# Patient Record
Sex: MALE | Race: WHITE | Employment: OTHER | URBAN - METROPOLITAN AREA
[De-identification: names, ages, dates, MRNs, and addresses within clinical notes are randomized per-mention and may not be internally consistent; named-entity substitution may affect disease eponyms.]

---

## 2018-11-02 ENCOUNTER — HOSPITAL ENCOUNTER (OUTPATIENT)
Facility: CLINIC | Age: 38
Discharge: HOME | End: 2018-11-02
Attending: OPHTHALMOLOGY | Admitting: OPHTHALMOLOGY
Payer: COMMERCIAL

## 2018-11-02 ENCOUNTER — SURGERY (OUTPATIENT)
Age: 38
End: 2018-11-02

## 2018-11-02 ENCOUNTER — ANESTHESIA EVENT (OUTPATIENT)
Dept: SURGERY | Facility: CLINIC | Age: 38
End: 2018-11-02
Payer: COMMERCIAL

## 2018-11-02 ENCOUNTER — TELEPHONE (OUTPATIENT)
Dept: OPHTHALMOLOGY | Facility: CLINIC | Age: 38
End: 2018-11-02

## 2018-11-02 ENCOUNTER — ANESTHESIA (OUTPATIENT)
Dept: SURGERY | Facility: CLINIC | Age: 38
End: 2018-11-02
Payer: COMMERCIAL

## 2018-11-02 ENCOUNTER — TRANSFERRED RECORDS (OUTPATIENT)
Dept: HEALTH INFORMATION MANAGEMENT | Facility: CLINIC | Age: 38
End: 2018-11-02

## 2018-11-02 VITALS
RESPIRATION RATE: 16 BRPM | DIASTOLIC BLOOD PRESSURE: 92 MMHG | WEIGHT: 289 LBS | SYSTOLIC BLOOD PRESSURE: 165 MMHG | TEMPERATURE: 97.5 F | OXYGEN SATURATION: 97 % | HEIGHT: 73 IN | BODY MASS INDEX: 38.3 KG/M2

## 2018-11-02 DIAGNOSIS — S05.31XA RUPTURED GLOBE OF RIGHT EYE, INITIAL ENCOUNTER: Primary | ICD-10-CM

## 2018-11-02 LAB
GRAM STN SPEC: ABNORMAL
GRAM STN SPEC: NORMAL
KOH PREP SPEC: NORMAL
Lab: ABNORMAL
Lab: ABNORMAL
Lab: NORMAL
SPECIMEN SOURCE: ABNORMAL
SPECIMEN SOURCE: ABNORMAL
SPECIMEN SOURCE: NORMAL

## 2018-11-02 PROCEDURE — 87801 DETECT AGNT MULT DNA AMPLI: CPT | Performed by: OPHTHALMOLOGY

## 2018-11-02 PROCEDURE — 25000566 ZZH SEVOFLURANE, EA 15 MIN: Performed by: OPHTHALMOLOGY

## 2018-11-02 PROCEDURE — 27210794 ZZH OR GENERAL SUPPLY STERILE: Performed by: OPHTHALMOLOGY

## 2018-11-02 PROCEDURE — 71000004 ZZH RECOVERY EYE PHASE 1 LEVEL 1 FIRST HR: Performed by: OPHTHALMOLOGY

## 2018-11-02 PROCEDURE — 87075 CULTR BACTERIA EXCEPT BLOOD: CPT | Performed by: OPHTHALMOLOGY

## 2018-11-02 PROCEDURE — 37000009 ZZH ANESTHESIA TECHNICAL FEE, EACH ADDTL 15 MIN: Performed by: OPHTHALMOLOGY

## 2018-11-02 PROCEDURE — 87176 TISSUE HOMOGENIZATION CULTR: CPT | Performed by: OPHTHALMOLOGY

## 2018-11-02 PROCEDURE — 36000104 ZZH EYE SURGERY LEVEL 4 1ST 30 MIN: Performed by: OPHTHALMOLOGY

## 2018-11-02 PROCEDURE — 87186 SC STD MICRODIL/AGAR DIL: CPT | Performed by: OPHTHALMOLOGY

## 2018-11-02 PROCEDURE — 87070 CULTURE OTHR SPECIMN AEROBIC: CPT | Performed by: OPHTHALMOLOGY

## 2018-11-02 PROCEDURE — 25000125 ZZHC RX 250: Performed by: NURSE ANESTHETIST, CERTIFIED REGISTERED

## 2018-11-02 PROCEDURE — 25000128 H RX IP 250 OP 636: Performed by: NURSE ANESTHETIST, CERTIFIED REGISTERED

## 2018-11-02 PROCEDURE — 87181 SC STD AGAR DILUTION PER AGT: CPT | Performed by: OPHTHALMOLOGY

## 2018-11-02 PROCEDURE — 87210 SMEAR WET MOUNT SALINE/INK: CPT | Performed by: OPHTHALMOLOGY

## 2018-11-02 PROCEDURE — 37000008 ZZH ANESTHESIA TECHNICAL FEE, 1ST 30 MIN: Performed by: OPHTHALMOLOGY

## 2018-11-02 PROCEDURE — 25000125 ZZHC RX 250: Performed by: OPHTHALMOLOGY

## 2018-11-02 PROCEDURE — 25000128 H RX IP 250 OP 636: Performed by: OPHTHALMOLOGY

## 2018-11-02 PROCEDURE — 87205 SMEAR GRAM STAIN: CPT | Performed by: OPHTHALMOLOGY

## 2018-11-02 PROCEDURE — 87102 FUNGUS ISOLATION CULTURE: CPT | Performed by: OPHTHALMOLOGY

## 2018-11-02 PROCEDURE — 71000028 ZZH EYE RECOVERY PHASE 2 EACH 15 MINS: Performed by: OPHTHALMOLOGY

## 2018-11-02 PROCEDURE — 40000170 ZZH STATISTIC PRE-PROCEDURE ASSESSMENT II: Performed by: OPHTHALMOLOGY

## 2018-11-02 PROCEDURE — 87076 CULTURE ANAEROBE IDENT EACH: CPT | Performed by: OPHTHALMOLOGY

## 2018-11-02 PROCEDURE — 36000105 ZZH EYE SURGERY LEVEL 4 EA 15 ADDTL MIN: Performed by: OPHTHALMOLOGY

## 2018-11-02 PROCEDURE — 87077 CULTURE AEROBIC IDENTIFY: CPT | Performed by: OPHTHALMOLOGY

## 2018-11-02 RX ORDER — DEXAMETHASONE SODIUM PHOSPHATE 4 MG/ML
INJECTION, SOLUTION INTRA-ARTICULAR; INTRALESIONAL; INTRAMUSCULAR; INTRAVENOUS; SOFT TISSUE PRN
Status: DISCONTINUED | OUTPATIENT
Start: 2018-11-02 | End: 2018-11-02

## 2018-11-02 RX ORDER — GLYCOPYRROLATE 0.2 MG/ML
INJECTION, SOLUTION INTRAMUSCULAR; INTRAVENOUS PRN
Status: DISCONTINUED | OUTPATIENT
Start: 2018-11-02 | End: 2018-11-02

## 2018-11-02 RX ORDER — CIPROFLOXACIN 2 MG/ML
INJECTION, SOLUTION INTRAVENOUS PRN
Status: DISCONTINUED | OUTPATIENT
Start: 2018-11-02 | End: 2018-11-02

## 2018-11-02 RX ORDER — BALANCED SALT SOLUTION 6.4; .75; .48; .3; 3.9; 1.7 MG/ML; MG/ML; MG/ML; MG/ML; MG/ML; MG/ML
SOLUTION OPHTHALMIC PRN
Status: DISCONTINUED | OUTPATIENT
Start: 2018-11-02 | End: 2018-11-02 | Stop reason: HOSPADM

## 2018-11-02 RX ORDER — LIDOCAINE HYDROCHLORIDE 20 MG/ML
INJECTION, SOLUTION INFILTRATION; PERINEURAL PRN
Status: DISCONTINUED | OUTPATIENT
Start: 2018-11-02 | End: 2018-11-02

## 2018-11-02 RX ORDER — SODIUM CHLORIDE, SODIUM LACTATE, POTASSIUM CHLORIDE, CALCIUM CHLORIDE 600; 310; 30; 20 MG/100ML; MG/100ML; MG/100ML; MG/100ML
INJECTION, SOLUTION INTRAVENOUS CONTINUOUS
Status: DISCONTINUED | OUTPATIENT
Start: 2018-11-02 | End: 2018-11-02 | Stop reason: HOSPADM

## 2018-11-02 RX ORDER — ONDANSETRON 4 MG/1
4 TABLET, ORALLY DISINTEGRATING ORAL EVERY 30 MIN PRN
Status: DISCONTINUED | OUTPATIENT
Start: 2018-11-02 | End: 2018-11-02 | Stop reason: HOSPADM

## 2018-11-02 RX ORDER — DEXAMETHASONE SODIUM PHOSPHATE 4 MG/ML
INJECTION, SOLUTION INTRA-ARTICULAR; INTRALESIONAL; INTRAMUSCULAR; INTRAVENOUS; SOFT TISSUE PRN
Status: DISCONTINUED | OUTPATIENT
Start: 2018-11-02 | End: 2018-11-02 | Stop reason: HOSPADM

## 2018-11-02 RX ORDER — CYCLOPENTOLATE HYDROCHLORIDE 10 MG/ML
1 SOLUTION/ DROPS OPHTHALMIC
Status: DISCONTINUED | OUTPATIENT
Start: 2018-11-02 | End: 2018-11-02 | Stop reason: HOSPADM

## 2018-11-02 RX ORDER — HYDROMORPHONE HYDROCHLORIDE 1 MG/ML
.3-.5 INJECTION, SOLUTION INTRAMUSCULAR; INTRAVENOUS; SUBCUTANEOUS EVERY 10 MIN PRN
Status: DISCONTINUED | OUTPATIENT
Start: 2018-11-02 | End: 2018-11-02 | Stop reason: HOSPADM

## 2018-11-02 RX ORDER — ONDANSETRON 2 MG/ML
4 INJECTION INTRAMUSCULAR; INTRAVENOUS EVERY 30 MIN PRN
Status: DISCONTINUED | OUTPATIENT
Start: 2018-11-02 | End: 2018-11-02 | Stop reason: HOSPADM

## 2018-11-02 RX ORDER — PROPOFOL 10 MG/ML
INJECTION, EMULSION INTRAVENOUS PRN
Status: DISCONTINUED | OUTPATIENT
Start: 2018-11-02 | End: 2018-11-02

## 2018-11-02 RX ORDER — MEPERIDINE HYDROCHLORIDE 25 MG/ML
12.5 INJECTION INTRAMUSCULAR; INTRAVENOUS; SUBCUTANEOUS
Status: DISCONTINUED | OUTPATIENT
Start: 2018-11-02 | End: 2018-11-02 | Stop reason: HOSPADM

## 2018-11-02 RX ORDER — SODIUM CHLORIDE, SODIUM LACTATE, POTASSIUM CHLORIDE, CALCIUM CHLORIDE 600; 310; 30; 20 MG/100ML; MG/100ML; MG/100ML; MG/100ML
INJECTION, SOLUTION INTRAVENOUS CONTINUOUS PRN
Status: DISCONTINUED | OUTPATIENT
Start: 2018-11-02 | End: 2018-11-02

## 2018-11-02 RX ORDER — NALOXONE HYDROCHLORIDE 0.4 MG/ML
.1-.4 INJECTION, SOLUTION INTRAMUSCULAR; INTRAVENOUS; SUBCUTANEOUS
Status: DISCONTINUED | OUTPATIENT
Start: 2018-11-02 | End: 2018-11-02 | Stop reason: HOSPADM

## 2018-11-02 RX ORDER — FENTANYL CITRATE 50 UG/ML
INJECTION, SOLUTION INTRAMUSCULAR; INTRAVENOUS PRN
Status: DISCONTINUED | OUTPATIENT
Start: 2018-11-02 | End: 2018-11-02

## 2018-11-02 RX ORDER — PHENYLEPHRINE HYDROCHLORIDE 25 MG/ML
1 SOLUTION/ DROPS OPHTHALMIC
Status: DISCONTINUED | OUTPATIENT
Start: 2018-11-02 | End: 2018-11-02 | Stop reason: HOSPADM

## 2018-11-02 RX ORDER — FENTANYL CITRATE 50 UG/ML
25-50 INJECTION, SOLUTION INTRAMUSCULAR; INTRAVENOUS
Status: DISCONTINUED | OUTPATIENT
Start: 2018-11-02 | End: 2018-11-02 | Stop reason: HOSPADM

## 2018-11-02 RX ORDER — NEOSTIGMINE METHYLSULFATE 1 MG/ML
VIAL (ML) INJECTION PRN
Status: DISCONTINUED | OUTPATIENT
Start: 2018-11-02 | End: 2018-11-02

## 2018-11-02 RX ORDER — TROPICAMIDE 10 MG/ML
1 SOLUTION/ DROPS OPHTHALMIC
Status: DISCONTINUED | OUTPATIENT
Start: 2018-11-02 | End: 2018-11-02 | Stop reason: HOSPADM

## 2018-11-02 RX ORDER — TETRACAINE HYDROCHLORIDE 5 MG/ML
SOLUTION OPHTHALMIC PRN
Status: DISCONTINUED | OUTPATIENT
Start: 2018-11-02 | End: 2018-11-02 | Stop reason: HOSPADM

## 2018-11-02 RX ORDER — ATROPINE SULFATE 10 MG/ML
SOLUTION/ DROPS OPHTHALMIC PRN
Status: DISCONTINUED | OUTPATIENT
Start: 2018-11-02 | End: 2018-11-02 | Stop reason: HOSPADM

## 2018-11-02 RX ORDER — ONDANSETRON 2 MG/ML
INJECTION INTRAMUSCULAR; INTRAVENOUS PRN
Status: DISCONTINUED | OUTPATIENT
Start: 2018-11-02 | End: 2018-11-02

## 2018-11-02 RX ADMIN — WATER 0.5 ML: 1 INJECTION INTRAMUSCULAR; INTRAVENOUS; SUBCUTANEOUS at 17:17

## 2018-11-02 RX ADMIN — TROPICAMIDE 1 DROP: 10 SOLUTION/ DROPS OPHTHALMIC at 14:55

## 2018-11-02 RX ADMIN — FENTANYL CITRATE 100 MCG: 50 INJECTION, SOLUTION INTRAMUSCULAR; INTRAVENOUS at 15:26

## 2018-11-02 RX ADMIN — ONDANSETRON 4 MG: 2 INJECTION INTRAMUSCULAR; INTRAVENOUS at 17:21

## 2018-11-02 RX ADMIN — CIPROFLOXACIN 400 MG: 2 INJECTION INTRAVENOUS at 16:00

## 2018-11-02 RX ADMIN — PROPOFOL 50 MG: 10 INJECTION, EMULSION INTRAVENOUS at 16:08

## 2018-11-02 RX ADMIN — CEFTAZIDIME 0.1 ML: 2 INJECTION, POWDER, FOR SOLUTION INTRAVENOUS at 17:17

## 2018-11-02 RX ADMIN — PHENYLEPHRINE HYDROCHLORIDE 1 DROP: 25 SOLUTION/ DROPS OPHTHALMIC at 14:55

## 2018-11-02 RX ADMIN — PHENYLEPHRINE HYDROCHLORIDE 1 DROP: 25 SOLUTION/ DROPS OPHTHALMIC at 15:05

## 2018-11-02 RX ADMIN — LIDOCAINE HYDROCHLORIDE 60 MG: 20 INJECTION, SOLUTION INFILTRATION; PERINEURAL at 15:26

## 2018-11-02 RX ADMIN — TROPICAMIDE 1 DROP: 10 SOLUTION/ DROPS OPHTHALMIC at 15:06

## 2018-11-02 RX ADMIN — Medication 5.5 MG: at 16:41

## 2018-11-02 RX ADMIN — DEXMEDETOMIDINE HYDROCHLORIDE 4 MCG: 100 INJECTION, SOLUTION INTRAVENOUS at 16:23

## 2018-11-02 RX ADMIN — CYCLOPENTOLATE HYDROCHLORIDE 1 DROP: 10 SOLUTION/ DROPS OPHTHALMIC at 15:05

## 2018-11-02 RX ADMIN — DEXAMETHASONE SODIUM PHOSPHATE 4 MG: 4 INJECTION, SOLUTION INTRA-ARTICULAR; INTRALESIONAL; INTRAMUSCULAR; INTRAVENOUS; SOFT TISSUE at 15:56

## 2018-11-02 RX ADMIN — Medication 5.5 MG: at 15:56

## 2018-11-02 RX ADMIN — ATROPINE SULFATE 1 DROP: 10 SOLUTION OPHTHALMIC at 17:27

## 2018-11-02 RX ADMIN — NEOSTIGMINE METHYLSULFATE 3 MG: 1 INJECTION, SOLUTION INTRAVENOUS at 17:31

## 2018-11-02 RX ADMIN — FENTANYL CITRATE 50 MCG: 50 INJECTION, SOLUTION INTRAMUSCULAR; INTRAVENOUS at 16:09

## 2018-11-02 RX ADMIN — CYCLOPENTOLATE HYDROCHLORIDE 1 DROP: 10 SOLUTION/ DROPS OPHTHALMIC at 14:55

## 2018-11-02 RX ADMIN — MIDAZOLAM 2 MG: 1 INJECTION INTRAMUSCULAR; INTRAVENOUS at 15:20

## 2018-11-02 RX ADMIN — DEXMEDETOMIDINE HYDROCHLORIDE 8 MCG: 100 INJECTION, SOLUTION INTRAVENOUS at 16:21

## 2018-11-02 RX ADMIN — SODIUM CHLORIDE, POTASSIUM CHLORIDE, SODIUM LACTATE AND CALCIUM CHLORIDE: 600; 310; 30; 20 INJECTION, SOLUTION INTRAVENOUS at 15:20

## 2018-11-02 RX ADMIN — PROPOFOL 40 MG: 10 INJECTION, EMULSION INTRAVENOUS at 15:31

## 2018-11-02 RX ADMIN — DEXMEDETOMIDINE HYDROCHLORIDE 12 MCG: 100 INJECTION, SOLUTION INTRAVENOUS at 17:19

## 2018-11-02 RX ADMIN — NEOMYCIN SULFATE, POLYMYXIN B SULFATE, AND DEXAMETHASONE 1 TUBE: 3.5; 10000; 1 OINTMENT OPHTHALMIC at 17:28

## 2018-11-02 RX ADMIN — FENTANYL CITRATE 50 MCG: 50 INJECTION, SOLUTION INTRAMUSCULAR; INTRAVENOUS at 16:53

## 2018-11-02 RX ADMIN — PROPOFOL 200 MG: 10 INJECTION, EMULSION INTRAVENOUS at 15:26

## 2018-11-02 RX ADMIN — DEXAMETHASONE SODIUM PHOSPHATE 2 MG: 4 INJECTION, SOLUTION INTRA-ARTICULAR; INTRALESIONAL; INTRAMUSCULAR; INTRAVENOUS; SOFT TISSUE at 17:17

## 2018-11-02 RX ADMIN — BALANCED SALT SOLUTION 15 ML: 6.4; .75; .48; .3; 3.9; 1.7 SOLUTION OPHTHALMIC at 15:56

## 2018-11-02 RX ADMIN — LIDOCAINE HYDROCHLORIDE,EPINEPHRINE BITARTRATE 5 ML GIVEN: 20; .005 INJECTION, SOLUTION EPIDURAL; INFILTRATION; INTRACAUDAL; PERINEURAL at 17:26

## 2018-11-02 RX ADMIN — GLYCOPYRROLATE 0.4 MG: 0.2 INJECTION, SOLUTION INTRAMUSCULAR; INTRAVENOUS at 17:31

## 2018-11-02 RX ADMIN — TETRACAINE HYDROCHLORIDE 2 DROP: 5 SOLUTION OPHTHALMIC at 17:20

## 2018-11-02 RX ADMIN — DEXMEDETOMIDINE HYDROCHLORIDE 8 MCG: 100 INJECTION, SOLUTION INTRAVENOUS at 17:21

## 2018-11-02 RX ADMIN — ROCURONIUM BROMIDE 50 MG: 10 INJECTION INTRAVENOUS at 15:26

## 2018-11-02 RX ADMIN — VANCOMYCIN HYDROCHLORIDE 1 MG: 1 INJECTION, POWDER, LYOPHILIZED, FOR SOLUTION INTRAVENOUS at 17:17

## 2018-11-02 RX ADMIN — DEXMEDETOMIDINE HYDROCHLORIDE 8 MCG: 100 INJECTION, SOLUTION INTRAVENOUS at 16:10

## 2018-11-02 ASSESSMENT — LIFESTYLE VARIABLES: TOBACCO_USE: 0

## 2018-11-02 NOTE — IP AVS SNAPSHOT
[facility] Same Day Surgery    [address]    [place] [address]    Phone:  [phone]    Fax:  [phone]                                       After Visit Summary   11/2/2018    Otoniel Murry    MRN: [id number]           After Visit Summary Signature Page     I have received my discharge instructions, and my questions have been answered. I have discussed any challenges I see with this plan with the nurse or doctor.    ..........................................................................................................................................  Patient/Patient Representative Signature      ..........................................................................................................................................  Patient Representative Print Name and Relationship to Patient    ..................................................               ................................................  Date                                   Time    ..........................................................................................................................................  Reviewed by Signature/Title    ...................................................              ..............................................  Date                                               Time          22EPIC Rev 08/18

## 2018-11-02 NOTE — OP NOTE
Surgeon:    Tamera Walsh M.D  Assistant surgeon:       Parish Jain MD, Hans Jordan MD  Pre-operative diagnosis:  Ruptured globe right eye, corneal laceration, intraocular foreign body, traumatic cataract and possible  traumatic infectious endophthalmitis  Post-operative diagnosis:       Ruptured Globe right eye, traumatic infectious endophthalmitis, Corneal     Laceration, Traumatic cataract, intraocular foreign body, frosted branch angitis and diffuse retinal hemorrhages  Surgery:    RIGHT 23 gauge vitectomy , lensectomy, repair of corneal laceration,     removal of intraocular foreign body, intraocular antibiotics  Complications:   none  Anesthesia:    general anesthesia care and peribulbar block   Blood loss:    Scant  Complications :   None    INDICATIONS:   Otoniel Murry is a 38 year old patient with diagnosis of Ruptured globe right eye, corneal laceration, intraocular foreign body, here for surgical repair.     DESCRIPTION OF THE PROCEDURE   The patient was brought into the OR where anesthesia was given by the anesthesia department.  The eye was prepped and draped in the usual fashion for ophthalmic surgery, including the installation of one drop of povidone iodine.    The right eye was inspected , there was a full thickness corneal laceration supratemporally extending from paracentral cornea to limbus. There was an anterior chamber hypopyon, traumatic cataract, synchiae and an intraocular foreign body embedded in the lens. There was no view to fundus.    A temporal paracentesis was created and a weckcel was use collect aqueous humor sample from the anterior chamber. Next, the Anterior chamber was filled with Healon. The corneal laceration was repaired using 10-0 nylon sutures. Iris hooks were inserted to further dilate the pupil. A nasal incision was created using a keratome and the intraocular foreign body was removed using Wood forceps. 10-0 nylon sutures were used to close the cornea  incision. An inferior paracentesis was created and a Lewicky cannula was inserted. The vitrector was placed in the anterior chamber and the lens was removed with aspiration. the posterior synechiae and iris membranes were removed with vitreous forceps.    Marks were made on the sclera superotemporally, and superonasally 3.5 mm posterior to the limbus. Transscleral cannulas were inserted through the sclera using the trocars. The light pipe and vitrector were entered the eye. Dense vitritis, retinal hemorrhage and frosted branch angitis were noted. A vitreous biopsy was obtained using a 3 cc syringe attached to the vitrector. 1 cc sample was obtained and sent to the lab for gram stain, KOH and bacterial and fungal cultures. A pars plana vitrectomy was completed followed by peripheral vitrectomy assisted with scleral depression. The capsular complex was removed using forceps and vitrectomy. Intravitreal antibiotics vancomycin and ceftazidime were placed in the eye. The iris hooks were removed and additional 10-0 nylon corneal sutures were used to close the cornea wound and incisions.    The sclerotomies were closed using 6-0 plain sutures.  The eye pressure was inspected and was appropriate. Subconjunctival injections of ancef and dexamethasone were administered.  A peribulbar block consistent of a 1:1 mixtures of 2% Lidocaine and 0.75 % of marcaine with epinephrine and wydase was administered. The lid speculum was removed. The eye was cleaned with wet and dry gauze. A drop of atropine and maxitrol ointment was placed in the eye. An eye patch and chapman shield were taped over the eye.     The patient tolerated well the procedure and was discharge to the post-operative unit in stable conditions with no complications.  The surgery was assisted by Dr. Parish Jain. Due to the delicate and complex nature of this surgery, Dr. Parish Jain was required. Dr. Parish Jain assisted with the vitrectomy. I was present for the entire  surgery.

## 2018-11-02 NOTE — IP AVS SNAPSHOT
MRN:[id number]                      After Visit Summary   11/2/2018    Otoniel Murry    MRN: [id number]           Thank you!     Thank you for choosing [facility] for your care. Our goal is always to provide you with excellent care. Hearing back from our patients is one way we can continue to improve our services. Please take a few minutes to complete the written survey that you may receive in the mail after you visit with us. Thank you!        Patient Information     Date Of Birth          1980        About your hospital stay     You were admitted on:  November 2, 2018 You last received care in the:  [facility]    You were discharged on:  November 2, 2018       Who to Call     For medical emergencies, please call 911.  For non-urgent questions about your medical care, please call your primary care provider or clinic, None  For questions related to your surgery, please call your surgery clinic        Attending Provider     Provider Specialty    Tamera Walsh MD Ophthalmology       Primary Care Provider Fax #    Physician No Ref-Primary [phone]      Your next 10 appointments already scheduled     Nov 03, 2018 10:15 AM CDT   Post-Op with Parish Jain MD   Eye Clinic ([facility])    [address] [address]   [phone]            Nov 08, 2018 12:30 PM CST   Post-Op with Tamera Walsh MD   Eye Clinic ([facility])    [address] [address]   [phone]              Further instructions from your care team       Same Day Surgery Discharge Instructions for  Sedation and General Anesthesia       It's not unusual to feel dizzy, light-headed or faint for up to 24 hours after surgery or while taking pain medication.  If you have these symptoms: sit for a few minutes before standing and have someone  assist you when you get up to walk or use the bathroom.      You should rest and relax for the next 24 hours. We recommend you make arrangements to have an adult stay with you for at least 24 hours after your discharge.  Avoid hazardous and strenuous activity.      DO NOT DRIVE any vehicle or operate mechanical equipment for 24 hours following the end of your surgery.  Even though you may feel normal, your reactions may be affected by the medication you have received.      Do not drink alcoholic beverages for 24 hours following surgery.       Slowly progress to your regular diet as you feel able. It's not unusual to feel nauseated and/or vomit after receiving anesthesia.  If you develop these symptoms, drink clear liquids (apple juice, ginger ale, broth, 7-up, etc. ) until you feel better.  If your nausea and vomiting persists for 24 hours, please notify your surgeon.        All narcotic pain medications, along with inactivity and anesthesia, can cause constipation. Drinking plenty of liquids and increasing fiber intake will help.      For any questions of a medical nature, call your surgeon.      Do not make important decisions for 24 hours.      If you had general anesthesia, you may have a sore throat for a couple of days related to the breathing tube used during surgery.  You may use Cepacol lozenges to help with this discomfort.  If it worsens or if you develop a fever, contact your surgeon.       If you feel your pain is not well managed with the pain medications prescribed by your surgeon, please contact your surgeon's office to let them know so they can address your concerns.         [facility]  Discharge Instruction    EyeSurgery [facility]    MD Tamera Weiner MD Joseph Terry, MD  Will I have pain?  Some discomfort is normal and expected following surgery. The first few days after surgery you may need to use prescription pain pills. Taking Tylenol  (acetaminophen) regularly may help prevent pain.  Discomfort should gradually decrease and Tylenol should be sufficient to relieve pain. A foreign body sensation in the cornea of the eye is very common and caused by sutures placed at surgery. These sutures will go away in one to two weeks. If the pain worsens, you should call the doctor.  Do I need to wear an eye patch?  You do not need to wear an eye patch at home after the doctor has removed the patch on your first day after surgery. However, you may be more comfortable wearing a patch outside in the sun, when sleeping or napping, or in a atif, windy environment.  How much drainage should I have?  You may expect a moderate amount of drainage for a week. Gradually the drainage should decrease. The lids can be cleaned with a clean washcloth and gentle soap or diluted baby shampoo. Wipe the eyelids gently from the nose outward. Some blood in the tears is a normal finding.  Will there be swelling?  Some swelling is normal for about a week or two after which it will gradually decrease. Applying a cool compress, using a clean washcloth for 5 - 10 minutes several times a day may reduce the swelling and make you more comfortable. People may have some swelling of both eyes, especially if face down positioning is required. The white part of the eye may appear very red or bloody for a week or two. This may get worse a few days after surgery. Though the bright red appearance can look frightening, it is a normal finding early after surgery and will resolve in a few weeks.  Will I need to use eye drops?  You will be using several different kinds of eye drops or ointment (salve) when you leave the hospital. The directions will be on each bottle or tube. The medication with the red top will keep your eye dilated and may make your eye more sensitive to light. Wearing sunglasses may help. The other medication is a combination antibiotic/steroid to prevent infection and promote  healing.  Occasionally a third drop is used to control the pressure in your eye. A new bottle of artificial tears or lubricant ointment may be used along with your prescription eye drops after surgery. You will be using drops from four to eight weeks. Bring all eye medications (drops. ointments, or pills) with you  to each visit.   Always wash your hands before putting in the eye drops. You may wish to have someone else help you. Pull down on the lower lid and squeeze one drop from the bottle being careful not to touch the dropper to your eye or eyelid. One drop is sufficient, but another may be used if the first did not go into the eye. It is often easier to put in the drops if you are reclining or lying down. Wait five (5) minutes after the first drop before using the second drop to allow the medications to absorb into the eye.  How long will it take for my vision to improve?  Your vision should gradually improve, but it may take up to six months to regain your best vision. Frequently, air or gas bubbles are injected into the eye at the time of surgery. This will blur your vision significantly at first. As the bubble becomes smaller it will cause a black line in your vision that moves as you move your head. As the bubble becomes smaller you may notice that it looks more like a bubble or that it will break up into several smaller bubbles. It will take from a few days to a few weeks for the bubble to dissolve and be replaced by clear fluid.  You may notice floaters or double vision after your surgery. These symptoms usually will decrease with time. If the double vision is bothersome patching the eye may help.  If you notice a sudden worsening in your vision call your doctor.  Are there any physical restrictions after surgery?    There are no positional restrictions.    Heavy lifting (greater than 50 pounds), swimming and contact sports should be avoided for about 3 to 4 weeks after surgery.  You may resume your  usual sexual activities about one week after surgery.  When may I return to work or my normal activities?  Depending on the type or work, you may return to work within a few days. If your work involves physical activity or driving, you will need to restrict your activities and remain home longer.  You may watch television, look at magazines, or work puzzles. Reading may be uncomfortable for several days, but using the eyes will not cause any damage.  You may go outside as usual. If conditions are windy or atif, wear an eye pad to avoid getting dust or dirt in the eye.  Can I travel?  You cannot fly in an airplane or drive into the mountains as long as the air or gas bubble remains in your eye.    Are there any driving restrictions?  Someone will need to drive you home from the hospital. Generally driving can be resumed in several days if you have good vision in your other eye. If you do not feel comfortable driving, do not drive! Your depth perception will be decreased so you will want to try driving during the day in light traffic until you feel comfortable driving. You should restrict your driving while you are taking prescription pain pills as they also can affect your judgment.  When can I shower and wash my hair?  You may shower or bathe when you get home, but avoid getting water in your eye. You may want someone to help you shampoo your hair at first.  You may shave, brush your teeth, or comb your hair. Do not use make-up, mascara, or creams/lotions around your eye for several weeks.  When will I see the doctor again?  Generally, you will be seen the first day after surgery and again 1-2 weeks later. If you have not received a return appointment before leaving the hospital, you should call our office during the business hours to arrange an appointment. If you will be seeing your local doctor instead of us, you will need to call that office to set up an appointment.  How do I reach a doctor if I have  "concerns?  One of our doctors is available by calling [facility] [phone]. Please try to call for routine questions and prescription refills during business hours.  You should call your doctor if:  You notice a sudden decrease in your vision.  Have severe pain or pain increases rather than subsiding.    You notice a new black curtain over your eye that is not the gas bubble. If you have any of these symptoms, you may need to be examined.        2/14/14                 Pending Results     Date and Time Order Name Status Description    11/2/2018 1730 Fluid Culture Aerobic Bacterial In process     11/2/2018 1702 Martha prep In process     11/2/2018 1702 Gram stain In process     11/2/2018 1702 Fungus Culture, non-blood In process     11/2/2018 1702 Fluid Culture Aerobic Bacterial In process     11/2/2018 1702 Anaerobic bacterial culture In process     11/2/2018 1649 Gram stain In process     11/2/2018 1626 Gram stain In process     11/2/2018 1555 Surgical pathology exam In process     11/2/2018 1555 Gram stain In process             Admission Information     Date & Time Provider Department Dept. Phone    11/2/2018 Tamera Walsh MD [facility] Same Day Surgery [phone]      Your Vitals Were     Blood Pressure Temperature Respirations Height Weight Pulse Oximetry    117/68 97  F (36.1  C) (Temporal) 17 1.854 m (6' 1\") 131.1 kg (289 lb) 97%    BMI (Body Mass Index)                   38.13 kg/m2           [facility] Information     [employer] lets you send messages to your doctor, view your test results, renew your prescriptions, schedule appointments and more. To sign up, go to [email or web address] . Click on \"Log in\" on the left side of the screen, which will take you to the Welcome page. Then click on \"Sign up Now\" on the right side of the page.     You will be asked to enter the access code listed below, as well as some personal information. Please follow the directions to " create your username and password.     Your access code is: [email or web address]  Expires: 2019  6:07 PM     Your access code will  in 90 days. If you need help or a new code, please call your [place] clinic or [phone].        Care EveryWhere ID     This is your Care EveryWhere ID. This could be used by other organizations to access your [place] medical records  [id number]        Equal Access to Services     [facility] UnitJOSE : Hadii aad ku hadasho Soomaali, waaxda luqadaha, qaybta kaalmada adeegyada, waxay idiin hayaan adeeg mckennaasternena ladenny . So [facility] [phone].    ATENCIÓN: Si habla español, tiene a zee disposición servicios gratuitos de asistencia lingüística. [facility] [phone].    We comply with applicable federal civil rights laws and [place] laws. We do not discriminate on the basis of race, color, national origin, age, disability, sex, sexual orientation, or gender identity.               Review of your medicines      UNREVIEWED medicines. Ask your doctor about these medicines        Dose / Directions    TYLENOL PO        Dose:  650 mg   Take 650 mg by mouth every 4 hours as needed for mild pain or fever   Refills:  0                Protect others around you: Learn how to safely use, store and throw away your medicines at www.disposemymeds.org.             Medication List: This is a list of all your medications and when to take them. Check marks below indicate your daily home schedule. Keep this list as a reference.      Medications           Morning Afternoon Evening Bedtime As Needed    TYLENOL PO   Take 650 mg by mouth every 4 hours as needed for mild pain or fever

## 2018-11-02 NOTE — PROGRESS NOTES
CC -   Ruptured globe with intraocular foreign body    HPI -   Otoniel Murry is a  38 year old year-old patient who is otherwise healthy who presented to the hospital with a ruptured globe and IOFB. 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 Occurred at around 6 pm on 11/1/18. Was seen in the local ED and was transferred to [place]. Saw an ophthalmologist there who placed a bandage contact lens and started him on Moxifloxacin. He was transferred to [facility] on 11/2/18 in the morning. Reports initially fainting when the injury happened. Reports pain in the right eye with some nausea.      PAST OCULAR SURGERY  None    IMAGING:  CT Orbit: Right eye intraocular foreign body    VA right eye: LP  Exam with 20 D - right eye:  Traumatic cataract, synechiae  Corneal wound ? Possibly self sealed  AC appears formed  Small Hypopyon    ASSESSMENT & PLAN  1. Ruptured globe of right eye, initial encounter      Plan for surgical exploration and repair.    Hans Jordan MD  PGY-3 Ophthalmology Resident  [phone]  ~~~~~~~~~~~~~~~~~~~~~~~~~~~~~~~~~~      Tamera Walsh MD   of Ophthalmology.  Retina Service   Department of Ophthalmology and Visual Neurosciences   [facility]  Phone: [phone]   Fax: [phone]

## 2018-11-02 NOTE — ANESTHESIA PREPROCEDURE EVALUATION
Anesthesia Evaluation     . Pt has had prior anesthetic.     No history of anesthetic complications          ROS/MED HX    ENT/Pulmonary:      (-) tobacco use, asthma and sleep apnea   Neurologic:       Cardiovascular:         METS/Exercise Tolerance:     Hematologic:         Musculoskeletal:         GI/Hepatic:        (-) GERD   Renal/Genitourinary:         Endo:         Psychiatric:         Infectious Disease:         Malignancy:         Other:                     Physical Exam  Normal systems: dental    Airway   Mallampati: II  TM distance: >3 FB  Neck ROM: full    Dental     Cardiovascular   Rhythm and rate: regular      Pulmonary    breath sounds clear to auscultation                    Anesthesia Plan      History & Physical Review  History and physical reviewed and following examination; no interval change.    ASA Status:  1 .        Plan for General and ETT with Intravenous induction. Maintenance will be Balanced.    PONV prophylaxis:  Ondansetron (or other 5HT-3)  Additional equipment: Videolaryngoscope      Postoperative Care  Postoperative pain management:  IV analgesics.      Consents  Anesthetic plan, risks, benefits and alternatives discussed with:  Patient..                          .

## 2018-11-02 NOTE — ANESTHESIA CARE TRANSFER NOTE
Patient: Otoniel Murry    Procedure(s):  23 GAUGE VITRECTOMY, REMOVAL OF INTRAOCULAR FOREIGN BODY RIGHT EYE. MEMBRANE PEEL, VITREOUS BIOPSY, INTRAVITREAL ANTIBIOTICS, ANTERIOR SEGMENT RECONSTRUCTION, CORNEAL WOUND CLOSURE    Diagnosis: RUPTURED GLOBE.  Diagnosis Additional Information: No value filed.    Anesthesia Type:   General, ETT     Note:  Airway :Face Mask  Patient transferred to:PACU  Comments: Patient breathing spontaneously.  Follows commands.  Suctioned and extubated.  Exchanging air well.  Transferred to PACU with 10L O2 via mask.  Monitors on.  VSS.  Patent IV.  Report and transfer of care to RN.  Handoff Report: Identifed the Patient, Identified the Reponsible Provider, Reviewed the pertinent medical history, Discussed the surgical course, Reviewed Intra-OP anesthesia mangement and issues during anesthesia, Set expectations for post-procedure period and Allowed opportunity for questions and acknowledgement of understanding      Vitals: (Last set prior to Anesthesia Care Transfer)    CRNA VITALS  11/2/2018 1710 - 11/2/2018 1748      11/2/2018             Pulse: 103    Ht Rate: 102    SpO2: 98 %    Resp Rate (observed): 20                Electronically Signed By: DMITRIY Vides CRNA  November 2, 2018  5:48 PM

## 2018-11-02 NOTE — DISCHARGE INSTRUCTIONS
Same Day Surgery Discharge Instructions for  Sedation and General Anesthesia       It's not unusual to feel dizzy, light-headed or faint for up to 24 hours after surgery or while taking pain medication.  If you have these symptoms: sit for a few minutes before standing and have someone assist you when you get up to walk or use the bathroom.      You should rest and relax for the next 24 hours. We recommend you make arrangements to have an adult stay with you for at least 24 hours after your discharge.  Avoid hazardous and strenuous activity.      DO NOT DRIVE any vehicle or operate mechanical equipment for 24 hours following the end of your surgery.  Even though you may feel normal, your reactions may be affected by the medication you have received.      Do not drink alcoholic beverages for 24 hours following surgery.       Slowly progress to your regular diet as you feel able. It's not unusual to feel nauseated and/or vomit after receiving anesthesia.  If you develop these symptoms, drink clear liquids (apple juice, ginger ale, broth, 7-up, etc. ) until you feel better.  If your nausea and vomiting persists for 24 hours, please notify your surgeon.        All narcotic pain medications, along with inactivity and anesthesia, can cause constipation. Drinking plenty of liquids and increasing fiber intake will help.      For any questions of a medical nature, call your surgeon.      Do not make important decisions for 24 hours.      If you had general anesthesia, you may have a sore throat for a couple of days related to the breathing tube used during surgery.  You may use Cepacol lozenges to help with this discomfort.  If it worsens or if you develop a fever, contact your surgeon.       If you feel your pain is not well managed with the pain medications prescribed by your surgeon, please contact your surgeon's office to let them know so they can address your concerns.         [facility]  Discharge  Instruction    EyeSurgery [facility]    MD Tamera Weiner MD Joseph Terry, MD  Will I have pain?  Some discomfort is normal and expected following surgery. The first few days after surgery you may need to use prescription pain pills. Taking Tylenol (acetaminophen) regularly may help prevent pain.  Discomfort should gradually decrease and Tylenol should be sufficient to relieve pain. A foreign body sensation in the cornea of the eye is very common and caused by sutures placed at surgery. These sutures will go away in one to two weeks. If the pain worsens, you should call the doctor.  Do I need to wear an eye patch?  You do not need to wear an eye patch at home after the doctor has removed the patch on your first day after surgery. However, you may be more comfortable wearing a patch outside in the sun, when sleeping or napping, or in a atif, windy environment.  How much drainage should I have?  You may expect a moderate amount of drainage for a week. Gradually the drainage should decrease. The lids can be cleaned with a clean washcloth and gentle soap or diluted baby shampoo. Wipe the eyelids gently from the nose outward. Some blood in the tears is a normal finding.  Will there be swelling?  Some swelling is normal for about a week or two after which it will gradually decrease. Applying a cool compress, using a clean washcloth for 5 - 10 minutes several times a day may reduce the swelling and make you more comfortable. People may have some swelling of both eyes, especially if face down positioning is required. The white part of the eye may appear very red or bloody for a week or two. This may get worse a few days after surgery. Though the bright red appearance can look frightening, it is a normal finding early after surgery and will resolve in a few weeks.  Will I need to use eye drops?  You will be using several different kinds of eye drops or ointment (salve) when you leave the  [facility]. The directions will be on each bottle or tube. The medication with the red top will keep your eye dilated and may make your eye more sensitive to light. Wearing sunglasses may help. The other medication is a combination antibiotic/steroid to prevent infection and promote healing.  Occasionally a third drop is used to control the pressure in your eye. A new bottle of artificial tears or lubricant ointment may be used along with your prescription eye drops after surgery. You will be using drops from four to eight weeks. Bring all eye medications (drops. ointments, or pills) with you  to each visit.   Always wash your hands before putting in the eye drops. You may wish to have someone else help you. Pull down on the lower lid and squeeze one drop from the bottle being careful not to touch the dropper to your eye or eyelid. One drop is sufficient, but another may be used if the first did not go into the eye. It is often easier to put in the drops if you are reclining or lying down. Wait five (5) minutes after the first drop before using the second drop to allow the medications to absorb into the eye.  How long will it take for my vision to improve?  Your vision should gradually improve, but it may take up to six months to regain your best vision. Frequently, air or gas bubbles are injected into the eye at the time of surgery. This will blur your vision significantly at first. As the bubble becomes smaller it will cause a black line in your vision that moves as you move your head. As the bubble becomes smaller you may notice that it looks more like a bubble or that it will break up into several smaller bubbles. It will take from a few days to a few weeks for the bubble to dissolve and be replaced by clear fluid.  You may notice floaters or double vision after your surgery. These symptoms usually will decrease with time. If the double vision is bothersome patching the eye may help.  If you notice a sudden  worsening in your vision call your doctor.  Are there any physical restrictions after surgery?    There are no positional restrictions.    Heavy lifting (greater than 50 pounds), swimming and contact sports should be avoided for about 3 to 4 weeks after surgery.  You may resume your usual sexual activities about one week after surgery.  When may I return to work or my normal activities?  Depending on the type or work, you may return to work within a few days. If your work involves physical activity or driving, you will need to restrict your activities and remain home longer.  You may watch television, look at magazines, or work puzzles. Reading may be uncomfortable for several days, but using the eyes will not cause any damage.  You may go outside as usual. If conditions are windy or atif, wear an eye pad to avoid getting dust or dirt in the eye.  Can I travel?  You cannot fly in an airplane or drive into the mountains as long as the air or gas bubble remains in your eye.    Are there any driving restrictions?  Someone will need to drive you home from the hospital. Generally driving can be resumed in several days if you have good vision in your other eye. If you do not feel comfortable driving, do not drive! Your depth perception will be decreased so you will want to try driving during the day in light traffic until you feel comfortable driving. You should restrict your driving while you are taking prescription pain pills as they also can affect your judgment.  When can I shower and wash my hair?  You may shower or bathe when you get home, but avoid getting water in your eye. You may want someone to help you shampoo your hair at first.  You may shave, brush your teeth, or comb your hair. Do not use make-up, mascara, or creams/lotions around your eye for several weeks.  When will I see the doctor again?  Generally, you will be seen the first day after surgery and again 1-2 weeks later. If you have not received a  return appointment before leaving the hospital, you should call our office during the business hours to arrange an appointment. If you will be seeing your local doctor instead of us, you will need to call that office to set up an appointment.  How do I reach a doctor if I have concerns?  One of our doctors is available by calling [facility] [phone]. Please try to call for routine questions and prescription refills during business hours.  You should call your doctor if:  You notice a sudden decrease in your vision.  Have severe pain or pain increases rather than subsiding.    You notice a new black curtain over your eye that is not the gas bubble. If you have any of these symptoms, you may need to be examined.        2/14/14

## 2018-11-03 NOTE — ANESTHESIA POSTPROCEDURE EVALUATION
Patient: Otoniel Murry    Procedure(s):  23 GAUGE VITRECTOMY, REMOVAL OF INTRAOCULAR FOREIGN BODY RIGHT EYE. MEMBRANE PEEL, VITREOUS BIOPSY, INTRAVITREAL ANTIBIOTICS, ANTERIOR SEGMENT RECONSTRUCTION, CORNEAL WOUND CLOSURE    Diagnosis:RUPTURED GLOBE.  Diagnosis Additional Information: No value filed.    Anesthesia Type:  General, ETT    Note:  Anesthesia Post Evaluation    Patient location during evaluation: PACU  Patient participation: Able to fully participate in evaluation  Level of consciousness: awake  Pain management: adequate  Airway patency: patent  Cardiovascular status: acceptable  Respiratory status: acceptable  Hydration status: acceptable  PONV: none             Last vitals:  Vitals:    11/02/18 1845 11/02/18 1900 11/02/18 1915   BP:  151/86 (!) 165/92   Resp:  16 16   Temp:      SpO2: 98% 98% 97%         Electronically Signed By: Simon Sheikh MD  November 2, 2018  8:11 PM

## 2018-11-03 NOTE — OR NURSING
AOX3-VSS-O2 sats >92% RA- Good PO intake, Denies c/o- Pt and responsible adult verbalize understanding of discharge instructions, denies questions. Up in W/C - transported to door for discharge to home.

## 2018-11-04 ENCOUNTER — OFFICE VISIT (OUTPATIENT)
Dept: OPHTHALMOLOGY | Facility: CLINIC | Age: 38
End: 2018-11-04
Payer: COMMERCIAL

## 2018-11-04 DIAGNOSIS — S05.31XD RUPTURED GLOBE OF RIGHT EYE, SUBSEQUENT ENCOUNTER: Primary | ICD-10-CM

## 2018-11-04 DIAGNOSIS — H44.001 ACUTE ENDOPHTHALMITIS OF RIGHT EYE: ICD-10-CM

## 2018-11-04 DIAGNOSIS — H33.031 RETINAL DETACHMENT OF RIGHT EYE WITH GIANT RETINAL TEAR: ICD-10-CM

## 2018-11-04 PROCEDURE — 66030 INJECTION TREATMENT OF EYE: CPT | Mod: ZF | Performed by: STUDENT IN AN ORGANIZED HEALTH CARE EDUCATION/TRAINING PROGRAM

## 2018-11-04 NOTE — MR AVS SNAPSHOT
After Visit Summary   11/4/2018    Otoniel Murry    MRN: [id number]           Patient Information     Date Of Birth          1980        Visit Information        Provider Department      11/4/2018 11:15 AM Hans Jordan MD [facility]        Today's Diagnoses     Ruptured globe of right eye, subsequent encounter    -  1    Acute endophthalmitis of right eye        Retinal detachment of right eye with giant retinal tear           Follow-ups after your visit        Follow-up notes from your care team     Return in about 1 day (around 11/5/2018) for B-scan tomorrow.      Your next 10 appointments already scheduled     Nov 05, 2018   Procedure with Tamera Walsh MD   [facility] ([facility])    [address] [place] [address]  [facility] [address] [phone]           Located in the Clinics and Surgery Center at [address] [place] [address].   parking is very convenient and highly recommended.  is a $6 flat rate fee.  Both  and self parkers should enter the main arrival plaza from [facility]; parking attendants will direct you based on your parking preference.            Nov 08, 2018 12:30 PM CST   Post-Op with Tamera Walsh MD   Eye Clinic ([facility])    [address] [id number] [phone]              Who to contact     Please call your clinic at [phone] to:    Ask questions about your health    Make or cancel appointments    Discuss your medicines    Learn about your test results    Speak to your doctor            Additional Information About Your Visit        lancers [employer] Information     Latio is an electronic gateway that provides easy, online access to your medical records. With Latio, you can request a clinic appointment, read your test results, renew a prescription or communicate with your care  team.     To sign up for [employer] visit the website at [email or web address]   You will be asked to enter the access code listed below, as well as some personal information. Please follow the directions to create your username and password.     Your access code is: [email or web address]  Expires: 2019  5:07 PM     Your access code will  in 90 days. If you need help or a new code, please contact your [facility] or call [phone] for assistance.        Care EveryWhere ID     This is your Care EveryWhere ID. This could be used by other organizations to access your [place] medical records  [id number]         Blood Pressure from Last 3 Encounters:   18 (!) 165/92    Weight from Last 3 Encounters:   18 131.1 kg (289 lb)              We Performed the Following     Anterior Chamber Injection, Medication     Diana-Operative Worksheet (Retina)          Today's Medication Changes          These changes are accurate as of 18 11:59 PM.  If you have any questions, ask your nurse or doctor.               Start taking these medicines.        Dose/Directions    dexamethasone 0.4 MG/0.1 ML Inj   Commonly known as:  DECADRON   Used for:  Acute endophthalmitis of right eye, Ruptured globe of right eye, subsequent encounter   Started by:  Hans Jordan MD        Dose:  0.1 mL   Inject 0.1 mLs (0.4 mg) into the eye once for 1 dose   Quantity:  0.1 mL   Refills:  0       vancomycin 2 MG/0.1 ML Inj   Commonly known as:  VANCOCIN   Used for:  Acute endophthalmitis of right eye, Ruptured globe of right eye, subsequent encounter   Started by:  Hans Jordan MD        Dose:  0.1 mL   Inject 0.1 mLs (2 mg) into the eye once for 1 dose   Quantity:  0.1 mL   Refills:  0            Where to get your medicines      Some of these will need a paper prescription and others can be bought over the counter.  Ask your nurse if you have questions.     Bring a paper prescription for each of  these medications     dexamethasone 0.4 MG/0.1 ML Inj    vancomycin 2 MG/0.1 ML Inj                Primary Care Provider Fax #    Physician No Ref-Primary [phone]       No address on file        Equal Access to Services     KHANG QUINTANILLAJOSE : Isabel feng raisa angie Rand, wahannyda luqadaha, qaybta kaalmada lucia, leann zunigadoris roman. So [facility] [phone].    ATENCIÓN: Si habla español, tiene a zee disposición servicios gratuitos de asistencia lingüística. Llame al [phone].    We comply with applicable federal civil rights laws and [place] laws. We do not discriminate on the basis of race, color, national origin, age, disability, sex, sexual orientation, or gender identity.            Thank you!     Thank you for choosing EYE CLINIC  for your care. Our goal is always to provide you with excellent care. Hearing back from our patients is one way we can continue to improve our services. Please take a few minutes to complete the written survey that you may receive in the mail after your visit with us. Thank you!             Your Updated Medication List - Protect others around you: Learn how to safely use, store and throw away your medicines at www.disposemymeds.org.          This list is accurate as of 11/4/18 11:59 PM.  Always use your most recent med list.                   Brand Name Dispense Instructions for use Diagnosis    dexamethasone 0.4 MG/0.1 ML Inj    DECADRON    0.1 mL    Inject 0.1 mLs (0.4 mg) into the eye once for 1 dose    Acute endophthalmitis of right eye, Ruptured globe of right eye, subsequent encounter       prednisoLONE acetate 1 % ophthalmic susp    PRED FORTE    1 Bottle    Place 1 drop into the right eye 4 times daily    S/P eye surgery       TYLENOL PO      Take 650 mg by mouth every 4 hours as needed for mild pain or fever        vancomycin 2 MG/0.1 ML Inj    VANCOCIN    0.1 mL    Inject 0.1 mLs (2 mg) into the eye once for 1 dose    Acute endophthalmitis of  right eye, Ruptured globe of right eye, subsequent encounter

## 2018-11-04 NOTE — PROGRESS NOTES
Postoperative day 2 status post RIGHT 23 gauge vitectomy , lensectomy, repair of corneal laceration, removal of intraocular foreign body, intraocular antibiotics    Comfortable, pain improving  Foreign body/scratcy sensation  Hypopyon appreciated - stable compared to yesterday  IOP wnl  ? Retinal detachment on B-scan; significant vitreous strands and debris  Vitreous cultures:   Gram stain: G+ rods   Cultures:  Bacillus cereus group, not anthracis     Broth only (enterococcus casseliflavus)     B-scan: vitreous debris, No retinal detachment    Plan:  No heavy lifting   Crow shield at all times  Vancomycin and Dexamethasone injection today  Retina detachment and endophthalmitis precautions were discussed with the patient and was asked to return if any of the those occur    Medications to operative eye  Prednisolone 4/day  Moxifloxacin q 1 hour while awake  Maxitrol oint at bedtime  Atropine (red top) 3/day       Return to clinic to tomorrow at 8:30 AM for B-scan, potential surgery tomorrow at [facility]    Hans Jordan MD  PGY-3 Ophthalmology Resident  [phone]  ~~~~~~~~~~~~~~~~~~~~~~~~~~~~~~~~~~   I did  not examined this patient.  I have reviewed the documentation above and agree with the assessment and plan as stated above and agree with all of its relevant components.    Tamera Walsh MD   of Ophthalmology.  Retina Service   Department of Ophthalmology and Visual Neurosciences   [facility]  Phone: [phone]   Fax: [phone]

## 2018-11-05 ENCOUNTER — ANESTHESIA (OUTPATIENT)
Dept: SURGERY | Facility: AMBULATORY SURGERY CENTER | Age: 38
End: 2018-11-05

## 2018-11-05 ENCOUNTER — SURGERY (OUTPATIENT)
Age: 38
End: 2018-11-05

## 2018-11-05 ENCOUNTER — HOSPITAL ENCOUNTER (OUTPATIENT)
Facility: AMBULATORY SURGERY CENTER | Age: 38
End: 2018-11-05
Attending: OPHTHALMOLOGY
Payer: COMMERCIAL

## 2018-11-05 ENCOUNTER — ANESTHESIA EVENT (OUTPATIENT)
Dept: SURGERY | Facility: AMBULATORY SURGERY CENTER | Age: 38
End: 2018-11-05

## 2018-11-05 ENCOUNTER — OFFICE VISIT (OUTPATIENT)
Dept: OPHTHALMOLOGY | Facility: CLINIC | Age: 38
End: 2018-11-05
Attending: OPHTHALMOLOGY
Payer: COMMERCIAL

## 2018-11-05 VITALS
OXYGEN SATURATION: 98 % | RESPIRATION RATE: 16 BRPM | HEIGHT: 73 IN | BODY MASS INDEX: 38.3 KG/M2 | DIASTOLIC BLOOD PRESSURE: 75 MMHG | WEIGHT: 289 LBS | SYSTOLIC BLOOD PRESSURE: 125 MMHG | TEMPERATURE: 98.1 F

## 2018-11-05 DIAGNOSIS — S05.31XA RUPTURED GLOBE OF RIGHT EYE, INITIAL ENCOUNTER: Primary | ICD-10-CM

## 2018-11-05 DIAGNOSIS — H44.001 ACUTE ENDOPHTHALMITIS OF RIGHT EYE: Primary | ICD-10-CM

## 2018-11-05 DIAGNOSIS — S05.31XD RUPTURED GLOBE OF RIGHT EYE, SUBSEQUENT ENCOUNTER: ICD-10-CM

## 2018-11-05 DIAGNOSIS — H44.001 ACUTE ENDOPHTHALMITIS OF RIGHT EYE: ICD-10-CM

## 2018-11-05 LAB
BACTERIA SPEC CULT: ABNORMAL
GRAM STN SPEC: NORMAL
Lab: ABNORMAL
SPECIMEN SOURCE: ABNORMAL
SPECIMEN SOURCE: ABNORMAL
SPECIMEN SOURCE: NORMAL

## 2018-11-05 PROCEDURE — 76512 OPH US DX B-SCAN: CPT | Mod: ZF | Performed by: STUDENT IN AN ORGANIZED HEALTH CARE EDUCATION/TRAINING PROGRAM

## 2018-11-05 PROCEDURE — G0463 HOSPITAL OUTPT CLINIC VISIT: HCPCS | Mod: ZF

## 2018-11-05 RX ORDER — OXYCODONE HCL 5 MG/5 ML
5 SOLUTION, ORAL ORAL EVERY 4 HOURS PRN
Status: DISCONTINUED | OUTPATIENT
Start: 2018-11-05 | End: 2018-11-06 | Stop reason: HOSPADM

## 2018-11-05 RX ORDER — CYCLOPENTOLATE HYDROCHLORIDE 10 MG/ML
1 SOLUTION/ DROPS OPHTHALMIC
Status: COMPLETED | OUTPATIENT
Start: 2018-11-05 | End: 2018-11-05

## 2018-11-05 RX ORDER — ONDANSETRON 4 MG/1
4 TABLET, ORALLY DISINTEGRATING ORAL EVERY 30 MIN PRN
Status: DISCONTINUED | OUTPATIENT
Start: 2018-11-05 | End: 2018-11-06 | Stop reason: HOSPADM

## 2018-11-05 RX ORDER — PHENYLEPHRINE HYDROCHLORIDE 25 MG/ML
1 SOLUTION/ DROPS OPHTHALMIC
Status: COMPLETED | OUTPATIENT
Start: 2018-11-05 | End: 2018-11-05

## 2018-11-05 RX ORDER — ONDANSETRON 2 MG/ML
4 INJECTION INTRAMUSCULAR; INTRAVENOUS EVERY 30 MIN PRN
Status: DISCONTINUED | OUTPATIENT
Start: 2018-11-05 | End: 2018-11-06 | Stop reason: HOSPADM

## 2018-11-05 RX ORDER — PROPOFOL 10 MG/ML
INJECTION, EMULSION INTRAVENOUS PRN
Status: DISCONTINUED | OUTPATIENT
Start: 2018-11-05 | End: 2018-11-05

## 2018-11-05 RX ORDER — SODIUM CHLORIDE, SODIUM LACTATE, POTASSIUM CHLORIDE, CALCIUM CHLORIDE 600; 310; 30; 20 MG/100ML; MG/100ML; MG/100ML; MG/100ML
INJECTION, SOLUTION INTRAVENOUS CONTINUOUS
Status: DISCONTINUED | OUTPATIENT
Start: 2018-11-05 | End: 2018-11-06 | Stop reason: HOSPADM

## 2018-11-05 RX ORDER — LIDOCAINE HYDROCHLORIDE 20 MG/ML
INJECTION, SOLUTION INFILTRATION; PERINEURAL PRN
Status: DISCONTINUED | OUTPATIENT
Start: 2018-11-05 | End: 2018-11-05

## 2018-11-05 RX ORDER — LABETALOL HYDROCHLORIDE 5 MG/ML
10 INJECTION, SOLUTION INTRAVENOUS
Status: DISCONTINUED | OUTPATIENT
Start: 2018-11-05 | End: 2018-11-05 | Stop reason: HOSPADM

## 2018-11-05 RX ORDER — GABAPENTIN 300 MG/1
300 CAPSULE ORAL ONCE
Status: COMPLETED | OUTPATIENT
Start: 2018-11-05 | End: 2018-11-05

## 2018-11-05 RX ORDER — HYDROMORPHONE HYDROCHLORIDE 1 MG/ML
.3-.5 INJECTION, SOLUTION INTRAMUSCULAR; INTRAVENOUS; SUBCUTANEOUS EVERY 10 MIN PRN
Status: DISCONTINUED | OUTPATIENT
Start: 2018-11-05 | End: 2018-11-06 | Stop reason: HOSPADM

## 2018-11-05 RX ORDER — TETRACAINE HYDROCHLORIDE 5 MG/ML
SOLUTION OPHTHALMIC PRN
Status: DISCONTINUED | OUTPATIENT
Start: 2018-11-05 | End: 2018-11-05 | Stop reason: HOSPADM

## 2018-11-05 RX ORDER — MEPERIDINE HYDROCHLORIDE 25 MG/ML
12.5 INJECTION INTRAMUSCULAR; INTRAVENOUS; SUBCUTANEOUS
Status: DISCONTINUED | OUTPATIENT
Start: 2018-11-05 | End: 2018-11-06 | Stop reason: HOSPADM

## 2018-11-05 RX ORDER — ATROPINE SULFATE 10 MG/ML
SOLUTION/ DROPS OPHTHALMIC PRN
Status: DISCONTINUED | OUTPATIENT
Start: 2018-11-05 | End: 2018-11-05 | Stop reason: HOSPADM

## 2018-11-05 RX ORDER — BALANCED SALT SOLUTION 6.4; .75; .48; .3; 3.9; 1.7 MG/ML; MG/ML; MG/ML; MG/ML; MG/ML; MG/ML
SOLUTION OPHTHALMIC PRN
Status: DISCONTINUED | OUTPATIENT
Start: 2018-11-05 | End: 2018-11-05 | Stop reason: HOSPADM

## 2018-11-05 RX ORDER — FENTANYL CITRATE 50 UG/ML
25-50 INJECTION, SOLUTION INTRAMUSCULAR; INTRAVENOUS
Status: DISCONTINUED | OUTPATIENT
Start: 2018-11-05 | End: 2018-11-05 | Stop reason: HOSPADM

## 2018-11-05 RX ORDER — MOXIFLOXACIN 5 MG/ML
1 SOLUTION/ DROPS OPHTHALMIC
Qty: 1 BOTTLE | Refills: 11 | Status: SHIPPED | OUTPATIENT
Start: 2018-11-06 | End: 2018-11-06

## 2018-11-05 RX ORDER — ACETAMINOPHEN 325 MG/1
975 TABLET ORAL ONCE
Status: COMPLETED | OUTPATIENT
Start: 2018-11-05 | End: 2018-11-05

## 2018-11-05 RX ORDER — CELECOXIB 200 MG/1
200 CAPSULE ORAL ONCE
Status: COMPLETED | OUTPATIENT
Start: 2018-11-05 | End: 2018-11-05

## 2018-11-05 RX ORDER — TROPICAMIDE 10 MG/ML
1 SOLUTION/ DROPS OPHTHALMIC
Status: COMPLETED | OUTPATIENT
Start: 2018-11-05 | End: 2018-11-05

## 2018-11-05 RX ORDER — NALOXONE HYDROCHLORIDE 0.4 MG/ML
.1-.4 INJECTION, SOLUTION INTRAMUSCULAR; INTRAVENOUS; SUBCUTANEOUS
Status: DISCONTINUED | OUTPATIENT
Start: 2018-11-05 | End: 2018-11-06 | Stop reason: HOSPADM

## 2018-11-05 RX ORDER — ONDANSETRON 2 MG/ML
INJECTION INTRAMUSCULAR; INTRAVENOUS PRN
Status: DISCONTINUED | OUTPATIENT
Start: 2018-11-05 | End: 2018-11-05

## 2018-11-05 RX ORDER — CIPROFLOXACIN 500 MG/1
500 TABLET, FILM COATED ORAL 2 TIMES DAILY
Qty: 14 TABLET | Refills: 0 | Status: SHIPPED | OUTPATIENT
Start: 2018-11-05 | End: 2019-02-13

## 2018-11-05 RX ORDER — FENTANYL CITRATE 50 UG/ML
INJECTION, SOLUTION INTRAMUSCULAR; INTRAVENOUS PRN
Status: DISCONTINUED | OUTPATIENT
Start: 2018-11-05 | End: 2018-11-05

## 2018-11-05 RX ORDER — SODIUM CHLORIDE, SODIUM LACTATE, POTASSIUM CHLORIDE, CALCIUM CHLORIDE 600; 310; 30; 20 MG/100ML; MG/100ML; MG/100ML; MG/100ML
INJECTION, SOLUTION INTRAVENOUS CONTINUOUS PRN
Status: DISCONTINUED | OUTPATIENT
Start: 2018-11-05 | End: 2018-11-05

## 2018-11-05 RX ORDER — FENTANYL CITRATE 50 UG/ML
25-50 INJECTION, SOLUTION INTRAMUSCULAR; INTRAVENOUS
Status: DISCONTINUED | OUTPATIENT
Start: 2018-11-05 | End: 2018-11-06 | Stop reason: HOSPADM

## 2018-11-05 RX ORDER — CIPROFLOXACIN 2 MG/ML
INJECTION, SOLUTION INTRAVENOUS PRN
Status: DISCONTINUED | OUTPATIENT
Start: 2018-11-05 | End: 2018-11-05

## 2018-11-05 RX ORDER — MOXIFLOXACIN IN NACL,ISO-OS/PF 0.3MG/0.3
SYRINGE (ML) INTRAOCULAR PRN
Status: DISCONTINUED | OUTPATIENT
Start: 2018-11-05 | End: 2018-11-05 | Stop reason: HOSPADM

## 2018-11-05 RX ADMIN — GABAPENTIN 300 MG: 300 CAPSULE ORAL at 11:59

## 2018-11-05 RX ADMIN — TROPICAMIDE 1 DROP: 10 SOLUTION/ DROPS OPHTHALMIC at 12:00

## 2018-11-05 RX ADMIN — Medication 500 ML: at 13:09

## 2018-11-05 RX ADMIN — BALANCED SALT SOLUTION 15 ML: 6.4; .75; .48; .3; 3.9; 1.7 SOLUTION OPHTHALMIC at 13:09

## 2018-11-05 RX ADMIN — PROPOFOL 30 MG: 10 INJECTION, EMULSION INTRAVENOUS at 12:53

## 2018-11-05 RX ADMIN — CYCLOPENTOLATE HYDROCHLORIDE 1 DROP: 10 SOLUTION/ DROPS OPHTHALMIC at 12:22

## 2018-11-05 RX ADMIN — ATROPINE SULFATE 1 DROP: 10 SOLUTION/ DROPS OPHTHALMIC at 13:09

## 2018-11-05 RX ADMIN — PHENYLEPHRINE HYDROCHLORIDE 1 DROP: 25 SOLUTION/ DROPS OPHTHALMIC at 12:00

## 2018-11-05 RX ADMIN — Medication 0.3 ML: at 14:23

## 2018-11-05 RX ADMIN — TETRACAINE HYDROCHLORIDE 2 DROP: 5 SOLUTION OPHTHALMIC at 12:55

## 2018-11-05 RX ADMIN — PHENYLEPHRINE HYDROCHLORIDE 1 DROP: 25 SOLUTION/ DROPS OPHTHALMIC at 12:08

## 2018-11-05 RX ADMIN — PHENYLEPHRINE HYDROCHLORIDE 1 DROP: 25 SOLUTION/ DROPS OPHTHALMIC at 12:22

## 2018-11-05 RX ADMIN — ONDANSETRON 4 MG: 2 INJECTION INTRAMUSCULAR; INTRAVENOUS at 12:53

## 2018-11-05 RX ADMIN — TROPICAMIDE 1 DROP: 10 SOLUTION/ DROPS OPHTHALMIC at 12:22

## 2018-11-05 RX ADMIN — FENTANYL CITRATE 50 MCG: 50 INJECTION, SOLUTION INTRAMUSCULAR; INTRAVENOUS at 12:52

## 2018-11-05 RX ADMIN — SODIUM CHLORIDE, SODIUM LACTATE, POTASSIUM CHLORIDE, CALCIUM CHLORIDE: 600; 310; 30; 20 INJECTION, SOLUTION INTRAVENOUS at 12:46

## 2018-11-05 RX ADMIN — ACETAMINOPHEN 975 MG: 325 TABLET ORAL at 11:59

## 2018-11-05 RX ADMIN — CYCLOPENTOLATE HYDROCHLORIDE 1 DROP: 10 SOLUTION/ DROPS OPHTHALMIC at 12:08

## 2018-11-05 RX ADMIN — TROPICAMIDE 1 DROP: 10 SOLUTION/ DROPS OPHTHALMIC at 12:08

## 2018-11-05 RX ADMIN — CELECOXIB 200 MG: 200 CAPSULE ORAL at 11:59

## 2018-11-05 RX ADMIN — CYCLOPENTOLATE HYDROCHLORIDE 1 DROP: 10 SOLUTION/ DROPS OPHTHALMIC at 12:00

## 2018-11-05 RX ADMIN — PROPOFOL 50 MG: 10 INJECTION, EMULSION INTRAVENOUS at 12:50

## 2018-11-05 RX ADMIN — LIDOCAINE HYDROCHLORIDE 60 MG: 20 INJECTION, SOLUTION INFILTRATION; PERINEURAL at 12:50

## 2018-11-05 RX ADMIN — CIPROFLOXACIN 400 MG: 2 INJECTION, SOLUTION INTRAVENOUS at 13:00

## 2018-11-05 RX ADMIN — FENTANYL CITRATE 50 MCG: 50 INJECTION, SOLUTION INTRAMUSCULAR; INTRAVENOUS at 12:47

## 2018-11-05 ASSESSMENT — CONF VISUAL FIELD
OD_INFERIOR_TEMPORAL_RESTRICTION: 1
OS_NORMAL: 1
OD_SUPERIOR_TEMPORAL_RESTRICTION: 1
OD_SUPERIOR_NASAL_RESTRICTION: 1
OD_INFERIOR_NASAL_RESTRICTION: 1

## 2018-11-05 ASSESSMENT — TONOMETRY
OS_IOP_MMHG: 14
IOP_METHOD: TONOPEN
OD_IOP_MMHG: 21

## 2018-11-05 ASSESSMENT — LIFESTYLE VARIABLES: TOBACCO_USE: 0

## 2018-11-05 ASSESSMENT — VISUAL ACUITY
OD_CC: HM
OS_CC: 20/20
CORRECTION_TYPE: CONTACTS
METHOD: SNELLEN - LINEAR

## 2018-11-05 ASSESSMENT — REFRACTION_WEARINGRX
OD_SPHERE: -3.00
OS_CYLINDER: SPHERE
OD_CYLINDER: SPHERE
OS_SPHERE: -3.25

## 2018-11-05 ASSESSMENT — SLIT LAMP EXAM - LIDS: COMMENTS: NORMAL

## 2018-11-05 NOTE — IP AVS SNAPSHOT
[facility]    [address] [place] [address]    Phone:  [phone]    Fax:  [phone]                                       After Visit Summary   11/5/2018    Otoniel Murry    MRN: [id number]           After Visit Summary Signature Page     I have received my discharge instructions, and my questions have been answered. I have discussed any challenges I see with this plan with the nurse or doctor.    ..........................................................................................................................................  Patient/Patient Representative Signature      ..........................................................................................................................................  Patient Representative Print Name and Relationship to Patient    ..................................................               ................................................  Date                                   Time    ..........................................................................................................................................  Reviewed by Signature/Title    ...................................................              ..............................................  Date                                               Time          22EPIC Rev 08/18

## 2018-11-05 NOTE — IP AVS SNAPSHOT
MRN:[id number]                      After Visit Summary   11/5/2018    Otoniel Murry    MRN: [id number]           Thank you!     Thank you for choosing [facility] for your care. Our goal is always to provide you with excellent care. Hearing back from our patients is one way we can continue to improve our services. Please take a few minutes to complete the written survey that you may receive in the mail after you visit with us. Thank you!        Patient Information     Date Of Birth          1980        About your hospital stay     You were admitted on:  November 5, 2018 You last received care in the:  [facility]    You were discharged on:  November 5, 2018       Who to Call     For medical emergencies, please call 911.  For non-urgent questions about your medical care, please call your primary care provider or clinic, None  For questions related to your surgery, please call your surgery clinic        Attending Provider     Provider Specialty    Tamera Walsh MD Ophthalmology       Primary Care Provider Fax #    Physician No Ref-Primary [phone]      After Care Instructions     Activity       Avoid strenuous activities the next several days.                  Your next 10 appointments already scheduled     Nov 06, 2018  7:30 AM CST   (Arrive by 7:15 AM)   Post-Op with Tamera Walsh MD   [facility] ([facility])    [address]  [address] [facility] [id number] [phone]            Nov 08, 2018 12:30 PM CST   Post-Op with Tamera Walsh MD   [facility] ([facility])    [address]  [address] [address]   [phone]              Further instructions from your care team           [facility]  Home Care Following Anesthesia  For 24 hours after surgery:  1. Get plenty of rest.  A  responsible adult must stay with you for at least 24 hours after you leave the surgery center.  2. Do not drive or use heavy equipment.  If you have weakness or tingling, don't drive or use heavy equipment until this feeling goes away.   3. Do not drink alcohol.   4. Avoid strenuous or risky activities.  Ask for help when climbing stairs.  5. You may feel lightheaded.  IF so, sit for a few minutes before standing.  Have someone help you get up.   6. If you have nausea (feel sick to your stomach): Drink only clear liquids such as apple juice, ginger ale, broth or 7-Up.  Rest may also help.  Be sure to drink enough fluids.  Move to a regular diet as you feel able.   7. You may have a slight fever.  Call the doctor if your fever is over 100 F (37.7 C) (taken under the tongue) or lasts longer than 24 hours.  8. You may have a dry mouth, a sore throat, muscle aches or trouble sleeping. These should go away after 24 hours.  9. Do not make important or legal decisions.     Tips for taking pain medications  To get the best pain relief possible, remember these points:    Take pain medications as directed, before pain becomes severe.    Pain medication can upset your stomach: taking it with food may help.    Constipation is a common side effect of pain medication. Drink plenty of  fluids.    Eat foods high in fiber. Take a stool softener if recommended by your doctor or pharmacist.    Do not drink alcohol, drive or operate machinery while taking pain medications.    Ask about other ways to control pain, such as with heat, ice or relaxation.    Tylenol/Acetaminophen Consumption  To help encourage the safe use of acetaminophen, the makers of TYLENOL  have lowered the maximum daily dose for single-ingredient Extra Strength TYLENOL  (acetaminophen) products sold in the [place] from 8 pills per day (4,000 mg) to 6 pills per day (3,000 mg). The dosing interval has also changed from 2 pills every 4-6 hours to 2 pills every 6  hours.    If you feel your pain relief is insufficient, you may take Tylenol/Acetaminophen in addition to your narcotic pain medication.     Be careful not to exceed 3,000 mg of Tylenol/Acetaminophen in a 24 hour period from all sources.    If you are taking extra strength Tylenol/acetaminophen (500 mg), the maximum dose is 6 tablets in 24 hours.    If you are taking regular strength acetaminophen (325 mg), the maximum dose is 9 tablets in 24 hours.    Call a doctor for any of the followin. Signs of infection (fever, growing tenderness at the surgery site, a large amount of drainage or bleeding, severe pain, foul-smelling drainage, redness, swelling).  2. It has been over 8 to 10 hours since surgery and you are still not able to urinate (pass water).  3. Headache for over 24 hours.  4. Numbness, tingling or weakness the day after surgery (if you had spinal anesthesia).  Your doctor is:    Dr. Tamera Walsh, Ophthalmology: [phone]               Or dial [phone] and ask for the resident on call for:  Ophthalmology  For emergency care, call the:  [facility]:  [phone] (TTY for hearing impaired: [phone])                  [facility]  [phone]  Post Operative Eye Surgery Instructions    MD Tamera Natarajan MD  Will I have pain?  Some discomfort is normal and expected following surgery. The first few days after surgery you may need to use prescription pain pills. Taking Tylenol (acetaminophen) regularly may help prevent pain.  Discomfort should gradually decrease and Tylenol should be sufficient to relieve pain. A foreign body sensation in the cornea of the eye is very common and caused by sutures placed at surgery. These sutures will go away in one to two weeks. If the pain worsens, you should call the doctor.  Do I need to wear an eye patch?  You do not need to wear an eye patch at home after the doctor has removed the patch on your first  day after surgery. However, you may be more comfortable wearing a patch outside in the sun, when sleeping or napping, or in a atif, windy environment.  How much drainage should I have?  You may expect a moderate amount of drainage for a week. Gradually the drainage should decrease. The lids can be cleaned with a clean washcloth and gentle soap or diluted baby shampoo. Wipe the eyelids gently from the nose outward. Some blood in the tears is a normal finding.  Will there be swelling?  Some swelling is normal for about a week or two after which it will gradually decrease. Applying a cool compress, using a clean washcloth for 5 - 10 minutes several times a day may reduce the swelling and make you more comfortable. People may have some swelling of both eyes, especially if face down positioning is required. The white part of the eye may appear very red or bloody for a week or two. This may get worse a few days after surgery. Though the bright red appearance can look frightening, it is a normal finding early after surgery and will resolve in a few weeks.  Will I need to use eye drops?  You will be using several different kinds of eye drops or ointment (salve) when you leave the hospital. The directions will be on each bottle or tube. The medication with the red top will keep your eye dilated and may make your eye more sensitive to light. Wearing sunglasses may help. The other medication is a combination antibiotic/steroid to prevent infection and promote healing.  Occasionally a third drop is used to control the pressure in your eye. A new bottle of artificial tears or lubricant ointment may be used along with your prescription eye drops after surgery. You will be using drops from four to eight weeks. Bring all eye medications (drops. ointments, or pills) with you  to each visit.   Always wash your hands before putting in the eye drops. You may wish to have someone else help you. Pull down on the lower lid and squeeze  one drop from the bottle being careful not to touch the dropper to your eye or eyelid. One drop is sufficient, but another may be used if the first did not go into the eye. It is often easier to put in the drops if you are reclining or lying down. Wait five (5) minutes after the first drop before using the second drop to allow the medications to absorb into the eye.  How long will it take for my vision to improve?  Your vision should gradually improve, but it may take up to six months to regain your best vision. Frequently, air or gas bubbles are injected into the eye at the time of surgery. This will blur your vision significantly at first. As the bubble becomes smaller it will cause a black line in your vision that moves as you move your head. As the bubble becomes smaller you may notice that it looks more like a bubble or that it will break up into several smaller bubbles. It will take from a few days to a few weeks for the bubble to dissolve and be replaced by clear fluid.  You may notice floaters or double vision after your surgery. These symptoms usually will decrease with time. If the double vision is bothersome patching the eye may help.  If you notice a sudden worsening in your vision call your doctor.  Are there any physical restrictions after surgery?  If an air or gas bubble was placed in the eye during surgery, you will be asked to spend most of your time (both awake and during the night) with your head in a specific position, frequently face down. As the eye heals and the bubble dissolves there will be less of a need for you to stay in that specific position. You should avoid sleeping on your back until the bubble has totally dissolved and you have been given permission from your surgeon. You should not fly in an airplane or go to high altitudes in the mountains while there is a bubble in your eye. If you should require any other surgery, under general anesthesia, while you still have an air bubble in  your eye, have your surgeon or anesthetist contact us prior to your surgery. Some anesthetic agents can make the bubble expand and seriously damage your eye.  Patients that have a gas/air bubble placed in their eye will have a green medical alert band on their wrist.  This band should not be removed until the doctor removes it for you or gives you permission to remove it.     Heavy lifting (greater than 50 pounds), swimming and contact sports should be avoided for about 3 to 4 weeks after surgery.  You may resume your usual sexual activities about one week after surgery.  When may I return to work or my normal activities?  Depending on the type or work, you may return to work within a few days. If your work involves physical activity or driving, you will need to restrict your activities and remain home longer.  You may watch television, look at magazines, or work puzzles. Reading may be uncomfortable for several days, but using the eyes will not cause any damage.  You may go outside as usual. If conditions are windy or atif, wear an eye pad to avoid getting dust or dirt in the eye.  Can I travel?  You cannot fly in an airplane or drive into the mountains as long as the air or gas bubble remains in your eye.    Are there any driving restrictions?  Someone will need to drive you home from the hospital. Generally driving can be resumed in several days if you have good vision in your other eye. If you do not feel comfortable driving, do not drive! Your depth perception will be decreased so you will want to try driving during the day in light traffic until you feel comfortable driving. You should restrict your driving while you are taking prescription pain pills as they also can affect your judgment.  When can I shower and wash my hair?  You may shower or bathe when you get home, but avoid getting water in your eye. You may want someone to help you shampoo your hair at first.  You may shave, brush your teeth, or comb  "your hair. Do not use make-up, mascara, or creams/lotions around your eye for several weeks.  When will I see the doctor again?  Generally, you will be seen the first day after surgery and again 1-2 weeks later. If you have not received a return appointment before leaving the hospital, you should call our office during the business hours to arrange an appointment. If you will be seeing your local doctor instead of us, you will need to call that office to set up an appointment.    If you have a green armband on, your physician will instruct you as to how long you will have to wear it at your post-operative follow-up appointment.  How do I reach a doctor if I have concerns?  One of our doctors is available by calling [facility] [phone]. Please try to call for routine questions and prescription refills during business hours.  You should call your doctor if:  You notice a sudden decrease in your vision.  Have severe pain or pain increases rather than subsiding.  You notice a new black curtain over your eye that is not the gas bubble. If you have any of these symptoms, you may need to be examined.    Pending Results     Date and Time Order Name Status Description    11/5/2018 1434 Surgical pathology exam In process     11/5/2018 1325 Gram stain In process     11/5/2018 1325 Fluid Culture Aerobic Bacterial In process     11/5/2018 1325 Anaerobic bacterial culture In process             Admission Information     Date & Time Provider Department Dept. Phone    11/5/2018 Tamera Walsh MD [facility] [phone]      Your Vitals Were     Blood Pressure Temperature Respirations Height Weight Pulse Oximetry    142/93 98.1  F (36.7  C) (Oral) 16 1.854 m (6' 1\") 131.1 kg (289 lb) 97%    BMI (Body Mass Index)                   38.13 kg/m2           MyChart Information     [employer] is an electronic gateway that provides easy, online access to your medical records. " With [employer], you can request a clinic appointment, read your test results, renew a prescription or communicate with your care team.     To sign up for [employer] visit the website at [email or web address]   You will be asked to enter the access code listed below, as well as some personal information. Please follow the directions to create your username and password.     Your access code is: [email or web address]  Expires: 2019  5:07 PM     Your access code will  in 90 days. If you need help or a new code, please contact your [facility] or call [phone] for assistance.        Care EveryWhere ID     This is your Care EveryWhere ID. This could be used by other organizations to access your [facility] medical records  [id number]        Equal Access to Services     KHANG RODRIGUEZ : Isabel Rand, valentino engel, abby myers, leann owens. So [facility] [phone].    ATENCIÓN: Si habla español, tiene a zee disposición servicios gratuitos de asistencia lingüística. Nicole al [phone].    We comply with applicable federal civil rights laws and [place] laws. We do not discriminate on the basis of race, color, national origin, age, disability, sex, sexual orientation, or gender identity.               Review of your medicines      START taking        Dose / Directions    ciprofloxacin 500 MG tablet   Commonly known as:  CIPRO   Used for:  Acute endophthalmitis of right eye        Dose:  500 mg   Take 1 tablet (500 mg) by mouth 2 times daily for 7 days   Quantity:  14 tablet   Refills:  0       moxifloxacin 0.5 % ophthalmic solution   Commonly known as:  VIGAMOX   Used for:  Acute endophthalmitis of right eye        Dose:  1 drop   Start taking on:  2018   Place 1 drop into the right eye every hour (while awake)   Quantity:  1 Bottle   Refills:  11         CONTINUE these medicines which have NOT CHANGED        Dose /  Directions    prednisoLONE acetate 1 % ophthalmic susp   Commonly known as:  PRED FORTE   Used for:  S/P eye surgery        Dose:  1 drop   Place 1 drop into the right eye 4 times daily   Quantity:  1 Bottle   Refills:  0       TYLENOL PO        Dose:  650 mg   Take 650 mg by mouth every 4 hours as needed for mild pain or fever   Refills:  0         STOP taking     dexamethasone 0.4 MG/0.1 ML Inj   Commonly known as:  DECADRON           vancomycin 2 MG/0.1 ML Inj   Commonly known as:  VANCOCIN                Where to get your medicines      These medications were sent to [place], [place] - [phone] [address]    Hours:  TRANSPLANT PHONE NUMBER [phone] Phone:  [phone]     ciprofloxacin 500 MG tablet    moxifloxacin 0.5 % ophthalmic solution                Protect others around you: Learn how to safely use, store and throw away your medicines at www.disposemymeds.org.        ANTIBIOTIC INSTRUCTION     You've Been Prescribed an Antibiotic - Now What?  Your healthcare team thinks that you or your loved one might have an infection. Some infections can be treated with antibiotics, which are powerful, life-saving drugs. Like all medications, antibiotics have side effects and should only be used when necessary. There are some important things you should know about your antibiotic treatment.      Your healthcare team may run tests before you start taking an antibiotic.    Your team may take samples (e.g., from your blood, urine or other areas) to run tests to look for bacteria. These test can be important to determine if you need an antibiotic at all and, if you do, which antibiotic will work best.      Within a few days, your healthcare team might change or even stop your antibiotic.    Your team may start you on an antibiotic while they are working to find out what is making you sick.    Your team might change your  antibiotic because test results show that a different antibiotic would be better to treat your infection.    In some cases, once your team has more information, they learn that you do not need an antibiotic at all. They may find out that you don't have an infection, or that the antibiotic you're taking won't work against your infection. For example, an infection caused by a virus can't be treated with antibiotics. Staying on an antibiotic when you don't need it is more likely to be harmful than helpful.      You may experience side effects from your antibiotic.    Like all medications, antibiotics have side effects. Some of these can be serious.    Let you healthcare team know if you have any known allergies when you are admitted to the hospital.    One significant side effect of nearly all antibiotics is the risk of severe and sometimes deadly diarrhea caused by Clostridium difficile (C. Difficile). This occurs when a person takes antibiotics because some good germs are destroyed. Antibiotic use allows C. diificile to take over, putting patients at high risk for this serious infection.    As a patient or caregiver, it is important to understand your or your loved one's antibiotic treatment. It is especially important for caregivers to speak up when patients can't speak for themselves. Here are some important questions to ask your healthcare team.    What infection is this antibiotic treating and how do you know I have that infection?    What side effects might occur from this antibiotic?    How long will I need to take this antibiotic?    Is it safe to take this antibiotic with other medications or supplements (e.g., vitamins) that I am taking?     Are there any special directions I need to know about taking this antibiotic? For example, should I take it with food?    How will I be monitored to know whether my infection is responding to the antibiotic?    What tests may help to make sure the right antibiotic is  prescribed for me?      Information provided by:  www.cdc.gov/getsmart  U.S. Department of Health and Human Services  Centers for disease Control and Prevention  National Center for Emerging and Zoonotic Infectious Diseases  Division of Healthcare Quality Promotion             Medication List: This is a list of all your medications and when to take them. Check marks below indicate your daily home schedule. Keep this list as a reference.      Medications           Morning Afternoon Evening Bedtime As Needed    ciprofloxacin 500 MG tablet   Commonly known as:  CIPRO   Take 1 tablet (500 mg) by mouth 2 times daily for 7 days                                moxifloxacin 0.5 % ophthalmic solution   Commonly known as:  VIGAMOX   Place 1 drop into the right eye every hour (while awake)   Start taking on:  11/6/2018                                prednisoLONE acetate 1 % ophthalmic susp   Commonly known as:  PRED FORTE   Place 1 drop into the right eye 4 times daily                                TYLENOL PO   Take 650 mg by mouth every 4 hours as needed for mild pain or fever   Last time this was given:  975 mg on 11/5/2018 11:59 AM

## 2018-11-05 NOTE — PROGRESS NOTES
Postoperative day 3 status post RIGHT 23 gauge vitectomy , lensectomy, repair of corneal laceration, removal of intraocular foreign body, intraocular antibiotics    Postoperative day 1 s/p AC Vancomycin and Dexamethasone injection - right eye    Comfortable, pain improving  Foreign body/scratcy sensation  Hypopyon improved  IOP wnl  Vitreous cultures:   Gram stain: G+ rods   Cultures  (aerobic):  Bacillus cereus - sensitive to Vancomycin     (broth only)  Enterococcus casseliflavus     (anaerobic): No growth - final pending   KOH    Negative   Fungus culture:  No growth - final pending    B-scan: Vitreous debris worse on B-scan today compared to 11/4/18  Areas of VR traction and possible Retinal detachment      Plan:  Pars plana vitrectomy (PPV) with endolaser and possible SO placement today    risks discussed 1:1000 risk of infection/bleed/loss of eye; 1:100 risk of RD and need for further surgery.Patient aware of prolonged healing after retinal surgery (up to a year after surgery) as well as possibility of air/gas/SO  instillation into eye. Patient agreed to proceed with surgery.    Hans Jordan MD  PGY-3 Ophthalmology Resident  [phone]    ~~~~~~~~~~~~~~~~~~~~~~~~~~~~~~~~~~   Complete documentation of historical and exam elements from today's encounter can be found in the full encounter summary report (not reduplicated in this progress note).  I personally obtained the chief complaint(s) and history of present illness.  I confirmed and edited as necessary the review of systems, past medical/surgical history, family history, social history, and examination findings as documented by others; and I examined the patient myself.  I personally reviewed the relevant tests, images, and reports as documented above.  I personally reviewed the ophthalmic test(s) associated with this encounter, agree with the interpretation(s) as documented by the resident/fellow, and have edited the corresponding report(s) as  necessary.   I formulated and edited as necessary the assessment and plan and discussed the findings and management plan with the patient and family    Tamera Walsh MD   of Ophthalmology.  Retina Service   Department of Ophthalmology and Visual Neurosciences   [facility]  Phone: [phone]   Fax: [phone]

## 2018-11-05 NOTE — OP NOTE
SURGEON: Tamera Walsh M.D.  Assitant surgeon: Hans Jordan MD  PREOPERATIVE DIAGNOSIS:   1. Traumatic endophthalmitis right eye   2. Status post open globe repair and intraocular foreign body removal  Right eye   POSTOPERATIVE DIAGNOSIS: same   NAME OF THE PROCEDURE:   1. 25  gauge pars plana vitectomy, vitreous biopsy, posterior hyaloid peel, endolaser; intravitreal antibiotics right eye    Vancomycin 1 mg/0.1 ml; Dexamethasone 0.4 mg/0.1 ml and moxifloxacin 0.1mg/0.1 ml  ANESTHESIA: sedation and Retrobulbar block   COMPLICATIONS: none   INDICATIONS:   This is a 38 year old patient with diagnosis of traumatic endophthalmitis right eye. Status post open globe repair and removal of intraocular foreign body. On examination his VA was Hand motions. The patient is here for surgical repair.  DESCRIPTION OF THE PROCEDURE   The patient was brought into the OR where general anesthesia was given by the anesthesia department. A peribulbar block consistent of a 1:1 mixtures of 2% Lidocaine and 0.75 % of marcaine and wydase was administered.   The eye was then prepped and draped in the usual fashion for ophthalmic surgery, including the installation of one drop of povidone iodine.  The microscope was brought to the operative field. It was noted the cornea sutures in place and the Anterior chamber was formed.  Attention was then turned to the vitrectomy. Marks were made on the sclera superotemporally, and superonasally 3.5 mm posterior to the limbus. The 25g transscleral cannulas were inserted through the sclera using the trocars. The vitrector handpiece and endoilluiminator were placed in the eye. Upon entering the eye it was noted that the patient had a vitreous debri and significant fibrinous membrane over the retina causing retina traction. there was significant pre-retinal heme over the macula and retina edema. The optic nerve was pale hyperemic.  A Pars plana vitrectomy (PPV) was performed. Vitreous cultures were  sent to the lab. Peripheral vitrectomy was assisted with scleral depression. Next, a posterior hyaloid peel was performed. Next,  Endolaser was placed in the periphery 360 degrees.  Next, The cannulas were removed.  The sclerotomies and conjunctiva  were closed with 6-0 plain suture. The temporal and nasal paracenthesis were closed with 10-0 nylon sutures   Intravitreal injection of antibiotics were administered  Vancomycin 1 mg/0.1 ml; Dexamethasone 0.4 mg/0.1 ml and moxifloxacin 0.1mg/0.1 ml.  The pressure was checked and verified to be appropriate.   Subconjunctival injections of Vancomycin and dexamethasone were administered.   The lid speculum was removed. The eye was cleaned with wet and dry gauze. A drop of atropine and maxitrol ointment was placed in the eye. An eye pad and chapman shield were taped over the eye.   The patient tolerated well the procedure and was discharge to the post-operative unit in stable conditions with no complications.    The surgery was assisted by Dr. Jordan. Due to the delicate and complex nature of this surgery,  Dr. Jordan was required.  Dr. Nikki Jain assisted with  The vitrectomy. I was present for the entire surgery.

## 2018-11-05 NOTE — ANESTHESIA POSTPROCEDURE EVALUATION
Anesthesia POST Procedure Evaluation    Patient: Otoniel Murry   MRN:     [id number] Gender:   male   Age:    38 year old :      1980        Preoperative Diagnosis: retinal detachment   Procedure(s):  Right VITRECTOMY PARSPLANA WITH 25 GAUGE SYSTEM, vitreal antibiotic injections, endolaser, air fluid exchange, vitreal cultures   Postop Comments: No value filed.       Anesthesia Type:  MAC    Reportable Event: NO     PAIN: Uncomplicated   Sign Out status: Comfortable, Well controlled pain     PONV: No PONV   Sign Out status:  No Nausea or Vomiting     Neuro/Psych: Uneventful perioperative course   Sign Out Status: Preoperative baseline; Age appropriate mentation     Airway/Resp.: Uneventful perioperative course   Sign Out Status: Non labored breathing, age appropriate RR; Resp. Status within EXPECTED Parameters     CV: Uneventful perioperative course   Sign Out status: Appropriate BP and perfusion indices; Appropriate HR/Rhythm     Disposition:   Sign Out in:  PACU  Disposition:  Phase II; Home  Recovery Course: Uneventful  Follow-Up: Not required           Last Anesthesia Record Vitals:  CRNA VITALS  2018 1401 - 2018 1501      2018             Pulse: 61    SpO2: 99 %          Last PACU/Preop Vitals:  Vitals:    18 1510 18 1525 18 1540   BP: 125/78 121/71 125/75   Resp: 16 16 16   Temp:      SpO2: 98% 97% 98%         Electronically Signed By: Nick Harrell MD, 2018, 4:59 PM

## 2018-11-05 NOTE — ANESTHESIA PREPROCEDURE EVALUATION
Anesthesia Evaluation     . Pt has had prior anesthetic.     No history of anesthetic complications          ROS/MED HX    ENT/Pulmonary:  - neg pulmonary ROS    (-) tobacco use, asthma and sleep apnea   Neurologic:  - neg neurologic ROS     Cardiovascular:  - neg cardiovascular ROS       METS/Exercise Tolerance:  >4 METS   Hematologic:  - neg hematologic  ROS       Musculoskeletal:  - neg musculoskeletal ROS       GI/Hepatic:  - neg GI/hepatic ROS      (-) GERD   Renal/Genitourinary:  - ROS Renal section negative       Endo:  - neg endo ROS       Psychiatric:  - neg psychiatric ROS       Infectious Disease:  - neg infectious disease ROS       Malignancy:      - no malignancy   Other:                     Physical Exam  Normal systems: dental    Airway   Mallampati: II  TM distance: >3 FB  Neck ROM: full    Dental     Cardiovascular   Rhythm and rate: regular      Pulmonary    breath sounds clear to auscultation                    Anesthesia Plan      History & Physical Review  History and physical reviewed and following examination; no interval change.    ASA Status:  1 .        Plan for General and ETT with Intravenous induction. Maintenance will be Balanced.    PONV prophylaxis:  Ondansetron (or other 5HT-3)  Additional equipment: Videolaryngoscope      Postoperative Care  Postoperative pain management:  IV analgesics.      Consents  Anesthetic plan, risks, benefits and alternatives discussed with:  Patient..                          .    ANUSHKAG [facility] AN PHYSICAL EXAM    Assessment:   ASA SCORE: 1    NPO Status: > 6 hours since completed Solid Foods      Proceeding: Proceed without further delay  Tobacco Use:  NO Active use of Tobacco/UNKNOWN Tobacco use status     Plan:   Anes. Type:  MAC   Pre-Induction: None   Induction:  IV (Standard)   Airway: Native Airway   Access/Monitoring: PIV   Maintenance: IV   Emergence: Recovery Site (PACU/ICU)   Logistics: Same Day Surgery     Postop Pain/Sedation  Strategy:  Standard-Options: Opioids PRN     PONV Management:  Adult Risk Factors:, Non-Smoker, Postop Opioids     CONSENT: Direct conversation   Plan and risks discussed with: Patient   Blood Products: Consent Deferred (Minimal Blood Loss)     Comments for Plan/Consent:  Plan:  MAC with routine monitors    Nick Harrell MD

## 2018-11-05 NOTE — DISCHARGE INSTRUCTIONS
[facility]  Home Care Following Anesthesia  For 24 hours after surgery:  1. Get plenty of rest.  A responsible adult must stay with you for at least 24 hours after you leave the surgery center.  2. Do not drive or use heavy equipment.  If you have weakness or tingling, don't drive or use heavy equipment until this feeling goes away.   3. Do not drink alcohol.   4. Avoid strenuous or risky activities.  Ask for help when climbing stairs.  5. You may feel lightheaded.  IF so, sit for a few minutes before standing.  Have someone help you get up.   6. If you have nausea (feel sick to your stomach): Drink only clear liquids such as apple juice, ginger ale, broth or 7-Up.  Rest may also help.  Be sure to drink enough fluids.  Move to a regular diet as you feel able.   7. You may have a slight fever.  Call the doctor if your fever is over 100 F (37.7 C) (taken under the tongue) or lasts longer than 24 hours.  8. You may have a dry mouth, a sore throat, muscle aches or trouble sleeping. These should go away after 24 hours.  9. Do not make important or legal decisions.     Tips for taking pain medications  To get the best pain relief possible, remember these points:    Take pain medications as directed, before pain becomes severe.    Pain medication can upset your stomach: taking it with food may help.    Constipation is a common side effect of pain medication. Drink plenty of  fluids.    Eat foods high in fiber. Take a stool softener if recommended by your doctor or pharmacist.    Do not drink alcohol, drive or operate machinery while taking pain medications.    Ask about other ways to control pain, such as with heat, ice or relaxation.    Tylenol/Acetaminophen Consumption  To help encourage the safe use of acetaminophen, the makers of TYLENOL  have lowered the maximum daily dose for single-ingredient Extra Strength TYLENOL  (acetaminophen) products sold in the [place] from 8 pills per  day (4,000 mg) to 6 pills per day (3,000 mg). The dosing interval has also changed from 2 pills every 4-6 hours to 2 pills every 6 hours.    If you feel your pain relief is insufficient, you may take Tylenol/Acetaminophen in addition to your narcotic pain medication.     Be careful not to exceed 3,000 mg of Tylenol/Acetaminophen in a 24 hour period from all sources.    If you are taking extra strength Tylenol/acetaminophen (500 mg), the maximum dose is 6 tablets in 24 hours.    If you are taking regular strength acetaminophen (325 mg), the maximum dose is 9 tablets in 24 hours.    Call a doctor for any of the followin. Signs of infection (fever, growing tenderness at the surgery site, a large amount of drainage or bleeding, severe pain, foul-smelling drainage, redness, swelling).  2. It has been over 8 to 10 hours since surgery and you are still not able to urinate (pass water).  3. Headache for over 24 hours.  4. Numbness, tingling or weakness the day after surgery (if you had spinal anesthesia).  Your doctor is:    Dr. Tamera Walsh, Ophthalmology: [phone]               Or dial [phone] and ask for the resident on call for:  Ophthalmology  For emergency care, call the:  [place] [facility]:  [phone] (TTY for hearing impaired: [phone])                  [facility]  [phone]  Post Operative Eye Surgery Instructions    MD Tamera Natarajan MD  Will I have pain?  Some discomfort is normal and expected following surgery. The first few days after surgery you may need to use prescription pain pills. Taking Tylenol (acetaminophen) regularly may help prevent pain.  Discomfort should gradually decrease and Tylenol should be sufficient to relieve pain. A foreign body sensation in the cornea of the eye is very common and caused by sutures placed at surgery. These sutures will go away in one to two weeks. If the pain worsens, you should call the  doctor.  Do I need to wear an eye patch?  You do not need to wear an eye patch at home after the doctor has removed the patch on your first day after surgery. However, you may be more comfortable wearing a patch outside in the sun, when sleeping or napping, or in a atif, windy environment.  How much drainage should I have?  You may expect a moderate amount of drainage for a week. Gradually the drainage should decrease. The lids can be cleaned with a clean washcloth and gentle soap or diluted baby shampoo. Wipe the eyelids gently from the nose outward. Some blood in the tears is a normal finding.  Will there be swelling?  Some swelling is normal for about a week or two after which it will gradually decrease. Applying a cool compress, using a clean washcloth for 5 - 10 minutes several times a day may reduce the swelling and make you more comfortable. People may have some swelling of both eyes, especially if face down positioning is required. The white part of the eye may appear very red or bloody for a week or two. This may get worse a few days after surgery. Though the bright red appearance can look frightening, it is a normal finding early after surgery and will resolve in a few weeks.  Will I need to use eye drops?  You will be using several different kinds of eye drops or ointment (salve) when you leave the hospital. The directions will be on each bottle or tube. The medication with the red top will keep your eye dilated and may make your eye more sensitive to light. Wearing sunglasses may help. The other medication is a combination antibiotic/steroid to prevent infection and promote healing.  Occasionally a third drop is used to control the pressure in your eye. A new bottle of artificial tears or lubricant ointment may be used along with your prescription eye drops after surgery. You will be using drops from four to eight weeks. Bring all eye medications (drops. ointments, or pills) with you  to each visit.    Always wash your hands before putting in the eye drops. You may wish to have someone else help you. Pull down on the lower lid and squeeze one drop from the bottle being careful not to touch the dropper to your eye or eyelid. One drop is sufficient, but another may be used if the first did not go into the eye. It is often easier to put in the drops if you are reclining or lying down. Wait five (5) minutes after the first drop before using the second drop to allow the medications to absorb into the eye.  How long will it take for my vision to improve?  Your vision should gradually improve, but it may take up to six months to regain your best vision. Frequently, air or gas bubbles are injected into the eye at the time of surgery. This will blur your vision significantly at first. As the bubble becomes smaller it will cause a black line in your vision that moves as you move your head. As the bubble becomes smaller you may notice that it looks more like a bubble or that it will break up into several smaller bubbles. It will take from a few days to a few weeks for the bubble to dissolve and be replaced by clear fluid.  You may notice floaters or double vision after your surgery. These symptoms usually will decrease with time. If the double vision is bothersome patching the eye may help.  If you notice a sudden worsening in your vision call your doctor.  Are there any physical restrictions after surgery?  If an air or gas bubble was placed in the eye during surgery, you will be asked to spend most of your time (both awake and during the night) with your head in a specific position, frequently face down. As the eye heals and the bubble dissolves there will be less of a need for you to stay in that specific position. You should avoid sleeping on your back until the bubble has totally dissolved and you have been given permission from your surgeon. You should not fly in an airplane or go to high altitudes in the mountains  while there is a bubble in your eye. If you should require any other surgery, under general anesthesia, while you still have an air bubble in your eye, have your surgeon or anesthetist contact us prior to your surgery. Some anesthetic agents can make the bubble expand and seriously damage your eye.  Patients that have a gas/air bubble placed in their eye will have a green medical alert band on their wrist.  This band should not be removed until the doctor removes it for you or gives you permission to remove it.     Heavy lifting (greater than 50 pounds), swimming and contact sports should be avoided for about 3 to 4 weeks after surgery.  You may resume your usual sexual activities about one week after surgery.  When may I return to work or my normal activities?  Depending on the type or work, you may return to work within a few days. If your work involves physical activity or driving, you will need to restrict your activities and remain home longer.  You may watch television, look at magazines, or work puzzles. Reading may be uncomfortable for several days, but using the eyes will not cause any damage.  You may go outside as usual. If conditions are windy or atif, wear an eye pad to avoid getting dust or dirt in the eye.  Can I travel?  You cannot fly in an airplane or drive into the mountains as long as the air or gas bubble remains in your eye.    Are there any driving restrictions?  Someone will need to drive you home from the hospital. Generally driving can be resumed in several days if you have good vision in your other eye. If you do not feel comfortable driving, do not drive! Your depth perception will be decreased so you will want to try driving during the day in light traffic until you feel comfortable driving. You should restrict your driving while you are taking prescription pain pills as they also can affect your judgment.  When can I shower and wash my hair?  You may shower or bathe when you get home,  but avoid getting water in your eye. You may want someone to help you shampoo your hair at first.  You may shave, brush your teeth, or comb your hair. Do not use make-up, mascara, or creams/lotions around your eye for several weeks.  When will I see the doctor again?  Generally, you will be seen the first day after surgery and again 1-2 weeks later. If you have not received a return appointment before leaving the hospital, you should call our office during the business hours to arrange an appointment. If you will be seeing your local doctor instead of us, you will need to call that office to set up an appointment.    If you have a green armband on, your physician will instruct you as to how long you will have to wear it at your post-operative follow-up appointment.  How do I reach a doctor if I have concerns?  One of our doctors is available by calling [facility] [phone]. Please try to call for routine questions and prescription refills during business hours.  You should call your doctor if:  You notice a sudden decrease in your vision.  Have severe pain or pain increases rather than subsiding.  You notice a new black curtain over your eye that is not the gas bubble. If you have any of these symptoms, you may need to be examined.

## 2018-11-05 NOTE — MR AVS SNAPSHOT
After Visit Summary   11/5/2018    Otoniel Murry    MRN: [id number]           Patient Information     Date Of Birth          1980        Visit Information        Provider Department      11/5/2018 8:30 AM Hans Jordan MD [facility]        Today's Diagnoses     Acute endophthalmitis of right eye        Ruptured globe of right eye, subsequent encounter           Follow-ups after your visit        Follow-up notes from your care team     Return in about 1 day (around 11/6/2018).      Your next 10 appointments already scheduled     Nov 05, 2018   Procedure with Tamera Walsh MD   [facility] ([facility])    [address] [place] [address]  5th Floor  [facility] [id number] [phone]           Located in the Clinics and Surgery Center at [address] [place] [id number].   parking is very convenient and highly recommended.  is a $6 flat rate fee.  Both  and self parkers should enter the main arrival plaza from [facility]; parking attendants will direct you based on your parking preference.            Nov 06, 2018  7:30 AM CST   (Arrive by 7:15 AM)   Post-Op with Tamera Walsh MD   [facility] ([facility])    [address] [place] [address]  4th Floor  [facility] [address] [phone]            Nov 08, 2018 12:30 PM CST   Post-Op with Tamera Walsh MD   Eye Clinic ([facility])    [address]  [facility]  [facility] [id number] [phone]              Who to contact     Please call your clinic at [phone] to:    Ask questions about your health    Make or cancel appointments    Discuss your medicines    Learn about your test results    Speak to your doctor            Additional Information About Your Visit        Diaferonhart Information     Transmension is an electronic gateway that provides easy, online access to  your medical records. With InfoVista, you can request a clinic appointment, read your test results, renew a prescription or communicate with your care team.     To sign up for InfoVista visit the website at [email or web address]   You will be asked to enter the access code listed below, as well as some personal information. Please follow the directions to create your username and password.     Your access code is: [email or web address]  Expires: 2019  5:07 PM     Your access code will  in 90 days. If you need help or a new code, please contact your [facility] or call [phone] for assistance.        Care EveryWhere ID     This is your Care EveryWhere ID. This could be used by other organizations to access your [facility] medical records  [id number]         Blood Pressure from Last 3 Encounters:   18 (!) 165/92    Weight from Last 3 Encounters:   18 131.1 kg (289 lb)              We Performed the Following     Ultrasound B-scan OD (right eye)        Primary Care Provider Fax #    Physician No Ref-Primary [phone]       No address on file        Equal Access to Services     [facility] : Hadii ping kline hadradhao Sodesean, waaxda luqadaha, qaybta kaalmada ademelly, leann stewart . So [facility] [phone].    ATENCIÓN: Si habla español, tiene a zee disposición servicios gratuitos de asistencia lingüística. Llame al [phone].    We comply with applicable federal civil rights laws and [place] laws. We do not discriminate on the basis of race, color, national origin, age, disability, sex, sexual orientation, or gender identity.            Thank you!     Thank you for choosing EYE CLINIC  for your care. Our goal is always to provide you with excellent care. Hearing back from our patients is one way we can continue to improve our services. Please take a few minutes to complete the written survey that you may receive in the mail after your visit  with us. Thank you!             Your Updated Medication List - Protect others around you: Learn how to safely use, store and throw away your medicines at www.disposemymeds.org.          This list is accurate as of 11/5/18 11:28 AM.  Always use your most recent med list.                   Brand Name Dispense Instructions for use Diagnosis    dexamethasone 0.4 MG/0.1 ML Inj    DECADRON    0.1 mL    Inject 0.1 mLs (0.4 mg) into the eye once for 1 dose    Acute endophthalmitis of right eye, Ruptured globe of right eye, subsequent encounter       prednisoLONE acetate 1 % ophthalmic susp    PRED FORTE    1 Bottle    Place 1 drop into the right eye 4 times daily    S/P eye surgery       TYLENOL PO      Take 650 mg by mouth every 4 hours as needed for mild pain or fever        vancomycin 2 MG/0.1 ML Inj    VANCOCIN    0.1 mL    Inject 0.1 mLs (2 mg) into the eye once for 1 dose    Acute endophthalmitis of right eye, Ruptured globe of right eye, subsequent encounter

## 2018-11-05 NOTE — NURSING NOTE
Chief Complaints and History of Present Illnesses   Patient presents with     Follow Up For     Ruptured globe of right eye, subsequent encounter      HPI    Affected eye(s):  Right   Symptoms:     Floaters   Flashes   Redness   Foreign body sensation      Duration:  1 day      Do you have eye pain now?:  Yes   Location:  OD   Pain Level:  Mild Pain (3), Moderate Pain (4)   Pain Duration:  1 day   Pain Frequency:  Constant   Pain Characteristics:  Aching      Comments:  States va is the same since last visit.  Prednisolone 4/day  Moxifloxacin q 1 hour while awake  Maxitrol oint at bedtime  Atropine (red top) 3/day   Leandro Carroll  8:40 AM November 5, 2018

## 2018-11-05 NOTE — OR NURSING
Post Op call done:  Wife, Vannesa answered. She stated they have been followed by Opthalmology MDs every day since OR day 11/2/18. Pt went to [facility] today. Was then sent to their C[facility] at [facility] location and is in surgery now. They have family to stay with in [place] and have a 6 and 2 year old staying with grandparents back in [place], [place] in [facility]. Vannesa stated they were at least relieved pt is getting care here.

## 2018-11-05 NOTE — ANESTHESIA CARE TRANSFER NOTE
Patient: Otoniel Murry    Procedure(s):  Right VITRECTOMY PARSPLANA WITH 25 GAUGE SYSTEM, vitreal antibiotic injections, endolaser, air fluid exchange, vitreal cultures    Diagnosis: retinal detachment  Diagnosis Additional Information: No value filed.    Anesthesia Type:   ETT, MAC     Note:  Airway :Room Air  Patient transferred to:Phase II  Comments: Comments:  Arrive Phase II, Stable, Airway Intact  127/83, 59,20,98.6,98%      Stephany Muniz CRNA, [place] Report: Identifed the Patient, Identified the Reponsible Provider, Reviewed the pertinent medical history, Discussed the surgical course, Reviewed Intra-OP anesthesia mangement and issues during anesthesia, Set expectations for post-procedure period and Allowed opportunity for questions and acknowledgement of understanding      Vitals: (Last set prior to Anesthesia Care Transfer)    CRNA VITALS  11/5/2018 1401 - 11/5/2018 1439      11/5/2018             Pulse: 61    SpO2: 99 %                Electronically Signed By: DMITRIY Lin CRNA  November 5, 2018  2:39 PM

## 2018-11-06 ENCOUNTER — OFFICE VISIT (OUTPATIENT)
Dept: OPHTHALMOLOGY | Facility: CLINIC | Age: 38
End: 2018-11-06
Attending: OPHTHALMOLOGY
Payer: COMMERCIAL

## 2018-11-06 DIAGNOSIS — H44.001 ACUTE ENDOPHTHALMITIS OF RIGHT EYE: ICD-10-CM

## 2018-11-06 DIAGNOSIS — Z98.890 S/P EYE SURGERY: ICD-10-CM

## 2018-11-06 RX ORDER — PREDNISOLONE ACETATE 10 MG/ML
1 SUSPENSION/ DROPS OPHTHALMIC 4 TIMES DAILY
Qty: 1 BOTTLE | Refills: 0 | Status: SHIPPED | OUTPATIENT
Start: 2018-11-06 | End: 2018-11-14

## 2018-11-06 RX ORDER — MOXIFLOXACIN 5 MG/ML
1 SOLUTION/ DROPS OPHTHALMIC
Qty: 1 BOTTLE | Refills: 11 | Status: SHIPPED | OUTPATIENT
Start: 2018-11-06 | End: 2018-11-14

## 2018-11-06 ASSESSMENT — TONOMETRY
OD_IOP_MMHG: 14
IOP_METHOD: TONOPEN
OS_IOP_MMHG: CTL

## 2018-11-06 ASSESSMENT — VISUAL ACUITY
OD_SC: HM
CORRECTION_TYPE: CONTACTS
METHOD: SNELLEN - LINEAR
OS_CC: 20/20

## 2018-11-06 ASSESSMENT — SLIT LAMP EXAM - LIDS: COMMENTS: NORMAL

## 2018-11-06 NOTE — PROGRESS NOTES
Postoperative day 1 status post RIGHT 25 gauge vitectomy, posterior hyaloid peel; vitreous biopsy, intraocular antibiotics right eye     Comfortable, pain improving  IOP wnl  Vitreous cultures:   Gram stain: G+ rods   Cultures  (aerobic):  Bacillus cereus - sensitive to Vancomycin     (broth only)  Enterococcus casseliflavus     (anaerobic): No growth - final pending   KOH    Negative   Fungus culture:  No growth - final pending    B-scan: Vitreous debris worse on B-scan today compared to 11/4/18  Areas of VR traction and possible Retinal detachment      Plan:  vigamox every hour  Predforte  every hour  cipro 500 mg twice a day  Atropine once a day   Follow up tomorrow    risks discussed 1:1000 risk of infection/bleed/loss of eye; 1:100 risk of RD and need for further surgery.Patient aware of prolonged healing after retinal surgery (up to a year after surgery) as well as possibility of air/gas/SO  instillation into eye. Patient agreed to proceed with surgery.    Hans Jordan MD  PGY-3 Ophthalmology Resident  [phone]    ~~~~~~~~~~~~~~~~~~~~~~~~~~~~~~~~~~   Complete documentation of historical and exam elements from today's encounter can be found in the full encounter summary report (not reduplicated in this progress note).  I personally obtained the chief complaint(s) and history of present illness.  I confirmed and edited as necessary the review of systems, past medical/surgical history, family history, social history, and examination findings as documented by others; and I examined the patient myself.  I personally reviewed the relevant tests, images, and reports as documented above.  I personally reviewed the ophthalmic test(s) associated with this encounter, agree with the interpretation(s) as documented by the resident/fellow, and have edited the corresponding report(s) as necessary.   I formulated and edited as necessary the assessment and plan and discussed the findings and management plan with the  patient and family    Tamera Walsh MD   of Ophthalmology.  Retina Service   Department of Ophthalmology and Visual Neurosciences   [facility]  Phone: [phone]   Fax: [phone]

## 2018-11-06 NOTE — MR AVS SNAPSHOT
After Visit Summary   2018    Otoniel Murry    MRN: [id number]           Patient Information     Date Of Birth          1980        Visit Information        Provider Department      2018 7:30 AM Tamera Walsh MD [facility] Ophthalmology        Today's Diagnoses     S/P eye surgery        Acute endophthalmitis of right eye           Follow-ups after your visit        Your next 10 appointments already scheduled     2018 12:30 PM CST   Post-[facility] with [facility]era [facility]    [address]  [facility] [address] [place] [address]   [phone]              Who to contact     Please call your clinic at [phone] to:    Ask questions about your health    Make or cancel appointments    Discuss your medicines    Learn about your test results    Speak to your doctor            Additional Information About Your Visit        MyChart Information     [employer] is an electronic gateway that provides easy, online access to your medical records. With [employer], you can request a clinic appointment, read your test results, renew a prescription or communicate with your care team.     To sign up for Hot Mix Mobile[employer] visit the website at [email or web address]   You will be asked to enter the access code listed below, as well as some personal information. Please follow the directions to create your username and password.     Your access code is: [email or web address]  Expires: 2019  5:07 PM     Your access code will  in 90 days. If you need help or a new code, please contact your [facility] or call [phone] for assistance.        Care EveryWhere ID     This is your Care EveryWhere ID. This could be used by other organizations to access your [place] medical records  [id number]         Blood Pressure from Last 3 Encounters:   18 125/75   18 (!) 165/92    Weight from Last  3 Encounters:   11/05/18 131.1 kg (289 lb)   11/02/18 131.1 kg (289 lb)              Today, you had the following     No orders found for display         Where to get your medicines      These medications were sent to [place], [place] - [address]  [address], [facility] [address]    Hours:  TRANSPLANT PHONE NUMBER [phone] Phone:  [phone]     moxifloxacin 0.5 % ophthalmic solution    prednisoLONE acetate 1 % ophthalmic susp          Primary Care Provider Fax #    Physician No Ref-Primary [phone]       No address on file        Equal Access to Services     [facility] : Hadnader Rand, valentino engel, abby myers, leann stewart . So [facility] [phone].    ATENCIÓN: Si habla español, tiene a zee disposición servicios gratuitos de asistencia lingüística. Llame al [phone].    We comply with applicable federal civil rights laws and [place] laws. We do not discriminate on the basis of race, color, national origin, age, disability, sex, sexual orientation, or gender identity.            Thank you!     Thank you for choosing [facility] OPHTHALMOLOGY  for your care. Our goal is always to provide you with excellent care. Hearing back from our patients is one way we can continue to improve our services. Please take a few minutes to complete the written survey that you may receive in the mail after your visit with us. Thank you!             Your Updated Medication List - Protect others around you: Learn how to safely use, store and throw away your medicines at www.disposemymeds.org.          This list is accurate as of 11/6/18  8:57 AM.  Always use your most recent med list.                   Brand Name Dispense Instructions for use Diagnosis    ciprofloxacin 500 MG tablet    CIPRO    14 tablet    Take 1 tablet (500 mg) by mouth 2 times daily for 7 days    Acute endophthalmitis of  right eye       moxifloxacin 0.5 % ophthalmic solution    VIGAMOX    1 Bottle    Place 1 drop into the right eye every hour (while awake)    Acute endophthalmitis of right eye       prednisoLONE acetate 1 % ophthalmic susp    PRED FORTE    1 Bottle    Place 1 drop into the right eye 4 times daily    S/P eye surgery       TYLENOL PO      Take 650 mg by mouth every 4 hours as needed for mild pain or fever

## 2018-11-06 NOTE — NURSING NOTE
Chief Complaints and History of Present Illnesses   Patient presents with     Post Op (Ophthalmology) Right Eye     POD#1 s/p 25  gauge pars plana vitectomy, vitreous biopsy, posterior hyaloid peel, endolaser; intravitreal antibiotics right eye      HPI    Affected eye(s):  Right   Symptoms:     Blurred vision   Floaters   Flashes         Do you have eye pain now?:  Yes   Location:  OD   Pain Level:  Mild Pain (2), Mild Pain (3)   Pain Frequency:  Intermittent   Pain Characteristics:  Aching      Comments:    Patient notes he is feeling okay, notes some flashes and floaters with patch on, some mild pain that comes and goes, feels that it isn't horrible    Vee Huizar November 6, 2018 7:23 AM

## 2018-11-07 ENCOUNTER — OFFICE VISIT (OUTPATIENT)
Dept: OPHTHALMOLOGY | Facility: CLINIC | Age: 38
End: 2018-11-07
Attending: OPHTHALMOLOGY
Payer: COMMERCIAL

## 2018-11-07 DIAGNOSIS — H35.60: ICD-10-CM

## 2018-11-07 DIAGNOSIS — H44.001 ACUTE ENDOPHTHALMITIS OF RIGHT EYE: ICD-10-CM

## 2018-11-07 DIAGNOSIS — Z48.810 AFTERCARE FOLLOWING SURGERY OF A SENSORY ORGAN: ICD-10-CM

## 2018-11-07 DIAGNOSIS — Z98.890 S/P EYE SURGERY: Primary | ICD-10-CM

## 2018-11-07 LAB
BACTERIA SPEC CULT: NO GROWTH
Lab: NORMAL
SPECIMEN SOURCE: NORMAL

## 2018-11-07 PROCEDURE — 92134 CPTRZ OPH DX IMG PST SGM RTA: CPT | Mod: ZF | Performed by: OPHTHALMOLOGY

## 2018-11-07 PROCEDURE — 92250 FUNDUS PHOTOGRAPHY W/I&R: CPT | Mod: ZF,59 | Performed by: OPHTHALMOLOGY

## 2018-11-07 PROCEDURE — G0463 HOSPITAL OUTPT CLINIC VISIT: HCPCS | Mod: ZF

## 2018-11-07 ASSESSMENT — VISUAL ACUITY
CORRECTION_TYPE: CONTACTS
OS_CC: 20/20
METHOD: SNELLEN - LINEAR

## 2018-11-07 ASSESSMENT — TONOMETRY
OS_IOP_MMHG: CTL
OD_IOP_MMHG: 12
IOP_METHOD: TONOPEN

## 2018-11-07 ASSESSMENT — SLIT LAMP EXAM - LIDS: COMMENTS: NORMAL

## 2018-11-07 ASSESSMENT — CONF VISUAL FIELD
OD_SUPERIOR_NASAL_RESTRICTION: 1
OS_NORMAL: 1
OD_INFERIOR_NASAL_RESTRICTION: 1
OD_INFERIOR_TEMPORAL_RESTRICTION: 1
METHOD: COUNTING FINGERS
OD_SUPERIOR_TEMPORAL_RESTRICTION: 1

## 2018-11-07 NOTE — PROGRESS NOTES
Postoperative day 2 status post RIGHT 25 gauge vitectomy, posterior hyaloid peel; vitreous biopsy, intraocular antibiotics right eye 11-5-18    [place] with +12- possible 20/400- blurry per patient     Comfortable, pain improving, view to the back improving  IOP wnl  Vitreous cultures:   Gram stain: G+ rods   Cultures  (aerobic):  Bacillus cereus - sensitive to Vancomycin, Ciprofloxacin     (broth only)  Enterococcus casseliflavus (on day one)     (anaerobic): No growth - final pending   KOH    Negative   Fungus culture:  No growth - final pending    Bandage contact lens cultures: G+ bacilli resembling diptheroids    OCT: Epiretinal membrane, cystoid macular edema 11/07/18   optos pic consistent with exam 11/07/18     Plan:  Continue vigamox every hour WA  Continue Predforte every hour [place]  Continue Cipro 500 mg twice a day x 7 days course  Atropine once a day   [facility] one [facility]  Follow up with Dr. Lyman in [place] on Friday  Could consider inj if worsening of infection    Risks discussed 1:1000 risk of infection/bleed/loss of eye; 1:100 risk of RD and need for further surgery.Patient aware of prolonged healing after retinal surgery (up to a year after surgery) as well as possibility of air/gas/SO  instillation into eye. Patient agreed to proceed with surgery.    Hans Jordan MD  PGY-3 Ophthalmology Resident  [phone]    ~~~~~~~~~~~~~~~~~~~~~~~~~~~~~~~~~~   Complete documentation of historical and exam elements from today's encounter can be found in the full encounter summary report (not reduplicated in this progress note).  I personally obtained the chief complaint(s) and history of present illness.  I confirmed and edited as necessary the review of systems, past medical/surgical history, family history, social history, and examination findings as documented by others; and I examined the patient myself.  I personally reviewed the relevant tests, images, and reports as documented  above.  I personally reviewed the ophthalmic test(s) associated with this encounter, agree with the interpretation(s) as documented by the resident/fellow, and have edited the corresponding report(s) as necessary.   I formulated and edited as necessary the assessment and plan and discussed the findings and management plan with the patient and family    Tamera Walsh MD   of Ophthalmology.  Retina Service   Department of Ophthalmology and Visual Neurosciences   [facility]  Phone: [phone]   Fax: [phone]

## 2018-11-07 NOTE — NURSING NOTE
Chief Complaints and History of Present Illnesses   Patient presents with     Follow Up For     2 day f/u s/p 25 gauge vitectomy, posterior hyaloid peel; vitreous biopsy, intraocular antibiotics RE.      HPI    Affected eye(s):  Right   Symptoms:     No floaters   No flashes   No redness   No tearing   No Dryness         Do you have eye pain now?:  No      Comments:  Pt here for a 2 day f/u s/p 25 gauge vitectomy, posterior hyaloid peel; vitreous biopsy, intraocular antibiotics RE. Pt notes no changes since yesterday, except some bubbles in the vision of RE.    Nohemy Gibbs, COMT 2:24 PM November 7, 2018

## 2018-11-07 NOTE — MR AVS SNAPSHOT
After Visit Summary   2018    Otoniel Murry    MRN: [id number]           Patient Information     Date Of Birth          1980        Visit Information        Provider Department      2018 12:30 PM [facility]        Today's Diagnoses     S/P eye surgery    -  1    Aftercare following surgery of a sensory organ           Follow-ups after your visit        Follow-up notes from your care team     Return in about 1 week (around 2018) for post op, OCT.      Your next 10 appointments already scheduled     2018  9:30 AM CST   RETURN RETINA with Tamera Walsh MD   Eye Clinic ([facility])    [address]  [address]  [facility] [address] [phone]              Who to contact     Please call your clinic at [phone] to:    Ask questions about your health    Make or cancel appointments    Discuss your medicines    Learn about your test results    Speak to your doctor            Additional Information About Your Visit        MyChart Information     [employer] is an electronic gateway that provides easy, online access to your medical records. With [employer], you can request a clinic appointment, read your test results, renew a prescription or communicate with your care team.     To sign up for Tripnaryt visit the website at [email or web address]   You will be asked to enter the access code listed below, as well as some personal information. Please follow the directions to create your username and password.     Your access code is: [email or web address]  Expires: 2019  5:07 PM     Your access code will  in 90 days. If you need help or a new code, please contact your [facility] or call [phone] for assistance.        Care EveryWhere ID     This is your Care EveryWhere ID. This could be used by other organizations to access your [facility]  records  [id number]         Blood Pressure from Last 3 Encounters:   11/05/18 125/75   11/02/18 (!) 165/92    Weight from Last 3 Encounters:   11/05/18 131.1 kg (289 lb)   11/02/18 131.1 kg (289 lb)              We Performed the Following     Fundus Photos OD (right eye)     OCT Retina Spectralis OD (right eye)        Primary Care Provider Fax #    Physician No Ref-Primary [phone]       No address on file        Equal Access to Services     KHANG RODRIGUEZ : Hadii aad ku hadasho Soomaali, waaxda luqadaha, qaybta kaalmada adeegyada, waxay idiin haycharlesn bhupendra andresasternena stewart . So [facility] [phone].    ATENCIÓN: Si habla español, tiene a zee disposición servicios gratuitos de asistencia lingüística. Ll[facility], [place] [phone].    We comply with applicable federal civil rights laws and [place] laws. We do not discriminate on the basis of race, color, national origin, age, disability, sex, sexual orientation, or gender identity.            Thank you!     Thank you for choosing EYE CLINIC  for your care. Our goal is always to provide you with excellent care. Hearing back from our patients is one way we can continue to improve our services. Please take a few minutes to complete the written survey that you may receive in the mail after your visit with us. Thank you!             Your Updated Medication List - Protect others around you: Learn how to safely use, store and throw away your medicines at www.disposemymeds.org.          This list is accurate as of 11/7/18  3:27 PM.  Always use your most recent med list.                   Brand Name Dispense Instructions for use Diagnosis    ciprofloxacin 500 MG tablet    CIPRO    14 tablet    Take 1 tablet (500 mg) by mouth 2 times daily for 7 days    Acute endophthalmitis of right eye       moxifloxacin 0.5 % ophthalmic solution    VIGAMOX    1 Bottle    Place 1 drop into the right eye every hour (while awake)    Acute endophthalmitis of right eye       prednisoLONE acetate 1 %  ophthalmic susp    PRED FORTE    1 Bottle    Place 1 drop into the right eye 4 times daily    S/P eye surgery       TYLENOL PO      Take 650 mg by mouth every 4 hours as needed for mild pain or fever

## 2018-11-08 LAB — COPATH REPORT: NORMAL

## 2018-11-09 ENCOUNTER — TELEPHONE (OUTPATIENT)
Dept: OPHTHALMOLOGY | Facility: CLINIC | Age: 38
End: 2018-11-09

## 2018-11-09 LAB
BACTERIA SPEC CULT: ABNORMAL
BACTERIA SPEC CULT: ABNORMAL
BACTERIA SPEC CULT: NORMAL
BACTERIA SPEC CULT: NORMAL
Lab: NORMAL
SPECIMEN SOURCE: ABNORMAL
SPECIMEN SOURCE: NORMAL

## 2018-11-09 NOTE — TELEPHONE ENCOUNTER
Received a call from patient's wife, Vannesa, regarding air bubbles seen near the pupil in the patient's right eye. He is  1 day s/p RE 25 g Vitrectomy, posterior hyaloid peel, vitreous biopsy and intraocular abx. The bubbles change position when the patient changes position. There is no pain. No changes in vision. Vannesa states she already talked to Dr. Jordan. Offered to see patient in the ER, patient and spouse have follow-up appt tomorrow and are comfortable with reassurance provided by myself and Dr. Jordan. They will call with any worsening symptoms.     Mesha Tracy MD  Ophthalmology Resident, PGY-2

## 2018-11-12 LAB
BACTERIA SPEC CULT: NO GROWTH
BACTERIA SPEC CULT: NORMAL
Lab: NORMAL
SPECIMEN SOURCE: NORMAL
SPECIMEN SOURCE: NORMAL

## 2018-11-14 ENCOUNTER — OFFICE VISIT (OUTPATIENT)
Dept: OPHTHALMOLOGY | Facility: CLINIC | Age: 38
End: 2018-11-14
Attending: OPHTHALMOLOGY
Payer: COMMERCIAL

## 2018-11-14 DIAGNOSIS — H44.001 ACUTE ENDOPHTHALMITIS OF RIGHT EYE: ICD-10-CM

## 2018-11-14 DIAGNOSIS — Z98.890 S/P EYE SURGERY: ICD-10-CM

## 2018-11-14 DIAGNOSIS — Z48.810 AFTERCARE FOLLOWING SURGERY OF A SENSORY ORGAN: ICD-10-CM

## 2018-11-14 DIAGNOSIS — S05.51XD INTRAOCULAR FOREIGN BODY OF RIGHT EYE, SUBSEQUENT ENCOUNTER: Primary | ICD-10-CM

## 2018-11-14 PROCEDURE — 92250 FUNDUS PHOTOGRAPHY W/I&R: CPT | Mod: ZF | Performed by: OPHTHALMOLOGY

## 2018-11-14 PROCEDURE — G0463 HOSPITAL OUTPT CLINIC VISIT: HCPCS | Mod: ZF

## 2018-11-14 PROCEDURE — 76512 OPH US DX B-SCAN: CPT | Mod: ZF | Performed by: OPHTHALMOLOGY

## 2018-11-14 RX ORDER — PREDNISOLONE ACETATE 10 MG/ML
1 SUSPENSION/ DROPS OPHTHALMIC EVERY 4 HOURS
Qty: 1 BOTTLE | Refills: 0 | Status: SHIPPED | OUTPATIENT
Start: 2018-11-14 | End: 2019-01-20

## 2018-11-14 RX ORDER — MOXIFLOXACIN 5 MG/ML
1 SOLUTION/ DROPS OPHTHALMIC 4 TIMES DAILY
Qty: 1 BOTTLE | Refills: 1 | Status: SHIPPED | OUTPATIENT
Start: 2018-11-14 | End: 2019-01-21

## 2018-11-14 ASSESSMENT — TONOMETRY
IOP_METHOD: TONOPEN
OD_IOP_MMHG: 12
OS_IOP_MMHG: 6

## 2018-11-14 ASSESSMENT — VISUAL ACUITY
OS_CC: 20/15
CORRECTION_TYPE: GLASSES
METHOD: SNELLEN - LINEAR
OD_CC: CF @ 1FT ECC

## 2018-11-14 ASSESSMENT — SLIT LAMP EXAM - LIDS: COMMENTS: NORMAL

## 2018-11-14 ASSESSMENT — REFRACTION_MANIFEST: OS_SPHERE: +11.00

## 2018-11-14 NOTE — Clinical Note
Michael casillas, could you pls evaluate this patient next monday. Status post 2 vitrectomies for traumatic endopthalmitis; now with Retinal detachment  Inf periphery, anterior to laser scars. Holding with scars. pls take a look to see if you think a scleral buckle and gas might be necessary vs observation. thanks

## 2018-11-14 NOTE — PROGRESS NOTES
Postoperative day 9 status post RIGHT 25 gauge vitectomy, posterior hyaloid peel; vitreous biopsy, intraocular antibiotics right eye 11-5-18  [place] with +11- possible 20/200- blurry per patient     Comfortable, pain improving, view to the back improving  IOP low   There is a localized inf Retinal detachment  With retina tear. It is located anterior to the laser scars without  Posterior progression.    Vitreous cultures:   Gram stain: G+ rods   Cultures  (aerobic):  Bacillus cereus - sensitive to Vancomycin, Ciprofloxacin     (broth only)  Enterococcus casseliflavus (on day one)     (anaerobic): No growth - final pending   KOH    Negative   Fungus culture:  No growth - final pending    Bandage contact lens cultures: G+ bacilli resembling diptheroids    Optical Coherence Tomography 11/14/18 : Epiretinal membrane, cystoid macular edema 11/07/18  Improving  Optical Coherence Tomography inferior midperiphery - subretinal fluid     optos 11/14/18 Fundus pic consistent with exam    ultrasound 11/14/18  Few vitreous debri; inferior area of possible subretinal fluid otherwise retina attached    Plan:  Continue vigamox qid   Continue Predforte qid  Atropine stop   maxitrol ointment at night   Could consider inj if worsening of infection  Monocular precautions    Return to Clinic next Monday with Dr. Kohli with Optical Coherence Tomography (inferior midperipheral cuts right eye) optos (inferior) and Autofluorescence     Parish Jain MD, PhD  Vitreoretinal Surgery Fellow    ~~~~~~~~~~~~~~~~~~~~~~~~~~~~~~~~~~   Complete documentation of historical and exam elements from today's encounter can be found in the full encounter summary report (not reduplicated in this progress note).  I personally obtained the chief complaint(s) and history of present illness.  I confirmed and edited as necessary the review of systems, past medical/surgical history, family history, social history, and examination findings as documented by others;  and I examined the patient myself.  I personally reviewed the relevant tests, images, and reports as documented above.  I personally reviewed the ophthalmic test(s) associated with this encounter, agree with the interpretation(s) as documented by the resident/fellow, and have edited the corresponding report(s) as necessary.   I formulated and edited as necessary the assessment and plan and discussed the findings and management plan with the patient and family    Tamera Walsh MD   of Ophthalmology.  Retina Service   Department of Ophthalmology and Visual Neurosciences   [facility]  Phone: [phone]   Fax: [phone]

## 2018-11-14 NOTE — MR AVS SNAPSHOT
After Visit Summary   2018    Otoniel Murry    MRN: [id number]           Patient Information     Date Of Birth          1980        Visit Information        Provider Department      2018 9:30 AM Tamera Walsh MD [facility]        Today's Diagnoses     Intraocular foreign body of right eye, subsequent encounter    -  1    Acute endophthalmitis of right eye        S/P eye surgery        Aftercare following surgery of a sensory organ           Follow-ups after your visit        Your next 10 appointments already scheduled     2018 10:15 AM CST   Post-Op with Malina Kohli MD   Eye Clinic ([facility])    [address]  9th [address] Clin [address] [place] [address] [id number] [phone]              Who to contact     Please call your clinic at [phone] to:    Ask questions about your health    Make or cancel appointments    Discuss your medicines    Learn about your test results    Speak to your doctor            Additional Information About Your Visit        MyChart Information     Kidaro is an electronic gateway that provides easy, online access to your medical records. With Kidaro, you can request a clinic appointment, read your test results, renew a prescription or communicate with your care team.     To sign up for LiveOpst visit the website at [email or web address]   You will be asked to enter the access code listed below, as well as some personal information. Please follow the directions to create your username and password.     Your access code is: [email or web address]  Expires: 2019  5:07 PM     Your access code will  in 90 days. If you need help or a new code, please contact your [facility] or call [phone] for assistance.        Care EveryWhere ID     This is your Care EveryWhere ID. This could be used by other organizations to access your [facility]  records  [id number]         Blood Pressure from Last 3 Encounters:   11/05/18 125/75   11/02/18 (!) 165/92    Weight from Last 3 Encounters:   11/05/18 131.1 kg (289 lb)   11/02/18 131.1 kg (289 lb)              We Performed the Following     Fundus Photos OD (right eye)     Ultrasound B-scan OD (right eye)          Today's Medication Changes          These changes are accurate as of 11/14/18 11:18 AM.  If you have any questions, ask your nurse or doctor.               These medicines have changed or have updated prescriptions.        Dose/Directions    moxifloxacin 0.5 % ophthalmic solution   Commonly known as:  VIGAMOX   This may have changed:  when to take this   Used for:  Acute endophthalmitis of right eye   Changed by:  Tamera Walsh MD        Dose:  1 drop   Place 1 drop into the right eye 4 times daily   Quantity:  1 Bottle   Refills:  1       prednisoLONE acetate 1 % ophthalmic susp   Commonly known as:  PRED FORTE   This may have changed:  when to take this   Used for:  S/P eye surgery   Changed by:  Tamera Walsh MD        Dose:  1 drop   Place 1 drop into the right eye every 4 hours   Quantity:  1 Bottle   Refills:  0            Where to get your medicines      These medications were sent to [facility] [place] [address]     Phone:  [phone]     moxifloxacin 0.5 % ophthalmic solution    prednisoLONE acetate 1 % ophthalmic susp                Primary Care Provider Fax #    Physician No Ref-Primary [phone]       No address on file        Equal Access to Services     KHANG RODRIGUEZ AH: Hadii ping cardonao Sodesean, waaxda luqadaha, qaybta kaalmada adeegyada, leann owens. So [facility] [phone].    ATENCIÓN: Si habla español, tiene a zee disposición servicios gratuitos de asistencia lingüística. Llame al [phone].    We comply with applicable federal civil rights laws and  [place] laws. We do not discriminate on the basis of race, color, national origin, age, disability, sex, sexual orientation, or gender identity.            Thank you!     Thank you for choosing EYE CLINIC  for your care. Our goal is always to provide you with excellent care. Hearing back from our patients is one way we can continue to improve our services. Please take a few minutes to complete the written survey that you may receive in the mail after your visit with us. Thank you!             Your Updated Medication List - Protect others around you: Learn how to safely use, store and throw away your medicines at www.disposemymeds.org.          This list is accurate as of 11/14/18 11:18 AM.  Always use your most recent med list.                   Brand Name Dispense Instructions for use Diagnosis    moxifloxacin 0.5 % ophthalmic solution    VIGAMOX    1 Bottle    Place 1 drop into the right eye 4 times daily    Acute endophthalmitis of right eye       prednisoLONE acetate 1 % ophthalmic susp    PRED FORTE    1 Bottle    Place 1 drop into the right eye every 4 hours    S/P eye surgery       TYLENOL PO      Take 650 mg by mouth every 4 hours as needed for mild pain or fever

## 2018-11-14 NOTE — NURSING NOTE
Chief Complaints and History of Present Illnesses   Patient presents with     Post Op (Ophthalmology) Right Eye     S/p 23g Vit, Removal of Metal FB, Membrane Peel and Antibiotic injection 11-2-18     HPI    Last Eye Exam:  11/7/18   Affected eye(s):  Right   Symptoms:     Blurred vision   Decreased vision      Duration:  1 week   Frequency:  Constant       Do you have eye pain now?:  No      Comments:  Pt denies any pain or discomfort. Vision remains very poor RE. No problems using drops, last drop used this morning. Notes that he is now wearing just glasses, no more contacts. Here with wife today, who needs help filling out [facility] paperwork. MD please review with pt today. THIERRY WAHL, COA 9:44 AM 11/14/2018

## 2018-11-19 ENCOUNTER — OFFICE VISIT (OUTPATIENT)
Dept: OPHTHALMOLOGY | Facility: CLINIC | Age: 38
End: 2018-11-19
Attending: OPHTHALMOLOGY
Payer: COMMERCIAL

## 2018-11-19 DIAGNOSIS — H33.051 RECENT RETINAL DETACHMENT OF RIGHT EYE, TOTAL/SUBTOTAL: Primary | ICD-10-CM

## 2018-11-19 DIAGNOSIS — S05.51XD INTRAOCULAR FOREIGN BODY OF RIGHT EYE, SUBSEQUENT ENCOUNTER: ICD-10-CM

## 2018-11-19 DIAGNOSIS — S05.51XD INTRAOCULAR FOREIGN BODY OF RIGHT EYE, SUBSEQUENT ENCOUNTER: Primary | ICD-10-CM

## 2018-11-19 PROCEDURE — 92250 FUNDUS PHOTOGRAPHY W/I&R: CPT | Mod: ZF | Performed by: OPHTHALMOLOGY

## 2018-11-19 PROCEDURE — 92134 CPTRZ OPH DX IMG PST SGM RTA: CPT | Mod: ZF | Performed by: OPHTHALMOLOGY

## 2018-11-19 PROCEDURE — G0463 HOSPITAL OUTPT CLINIC VISIT: HCPCS | Mod: ZF

## 2018-11-19 RX ORDER — NEOMYCIN SULFATE, POLYMYXIN B SULFATE, AND DEXAMETHASONE 3.5; 10000; 1 MG/G; [USP'U]/G; MG/G
0.5 OINTMENT OPHTHALMIC AT BEDTIME
COMMUNITY
End: 2019-01-18

## 2018-11-19 ASSESSMENT — EXTERNAL EXAM - LEFT EYE: OS_EXAM: NORMAL

## 2018-11-19 ASSESSMENT — VISUAL ACUITY
OS_CC: 20/20
METHOD: SNELLEN - LINEAR
OD_CC: 3'/200E
CORRECTION_TYPE: GLASSES

## 2018-11-19 ASSESSMENT — SLIT LAMP EXAM - LIDS
COMMENTS: NORMAL
COMMENTS: NORMAL

## 2018-11-19 ASSESSMENT — EXTERNAL EXAM - RIGHT EYE: OD_EXAM: NORMAL

## 2018-11-19 ASSESSMENT — CUP TO DISC RATIO: OD_RATIO: 0.2

## 2018-11-19 ASSESSMENT — TONOMETRY
OS_IOP_MMHG: 16
OD_IOP_MMHG: 09
IOP_METHOD: ICARE

## 2018-11-19 ASSESSMENT — REFRACTION_WEARINGRX
OS_CYLINDER: SPHERE
OS_SPHERE: -3.25
OD_SPHERE: -3.00
OD_CYLINDER: SPHERE

## 2018-11-19 NOTE — MR AVS SNAPSHOT
After Visit Summary   11/19/2018    Otoniel Murry    MRN: [id number]           Patient Information     Date Of Birth          1980        Visit Information        Provider Department      11/19/2018 10:15 AM Malina Kohli MD [facility]        Today's Diagnoses     Recent retinal detachment of right eye, total/subtotal    -  1    Intraocular foreign body of right eye, subsequent encounter           Follow-ups after your visit        Your next 10 appointments already scheduled     Nov 20, 2018   Procedure with Tamera Walsh MD   [facility] ([facility])    [address] [place] [address]  5th Floor  [facility] [address]   [phone]           Located in the Clinics and Surgery Center at [address] [place] [address], [place] [address].   parking is very convenient and highly recommended.  is a $6 flat rate fee.  Both  and self parkers should enter the main arrival plaza from [facility]; parking attendants will direct you based on your parking preference.            Nov 21, 2018  9:15 AM CST   RETURN RETINA with Tamera Walsh MD   Eye Clinic ([facility])    [address]  9th Fl Clin 9a  [facility] [id number] [phone]              Who to contact     Please call your clinic at [phone] to:    Ask questions about your health    Make or cancel appointments    Discuss your medicines    Learn about your test results    Speak to your doctor            Additional Information About Your Visit        MyChart Information     Dial2Dot is an electronic gateway that provides easy, online access to your medical records. With yeppt, you can request a clinic appointment, read your test results, renew a prescription or communicate with your care team.     To sign up for Dial2Dot visit the website at [email or web address]   You will be asked to enter the access code  listed below, as well as some personal information. Please follow the directions to create your username and password.     Your access code is: [email or web address]  Expires: 2019  5:07 PM     Your access code will  in 90 days. If you need help or a new code, please contact your [facility] or call [phone] for assistance.        Care EveryWhere ID     This is your Care EveryWhere ID. This could be used by other organizations to access your [place] medical records  [id number]         Blood Pressure from Last 3 Encounters:   18 125/75   18 (!) 165/92    Weight from Last 3 Encounters:   18 131.1 kg (289 lb)   18 131.1 kg (289 lb)              We Performed the Following     Fundus Autofluorescence Image (FAF) OD (right eye)     Fundus Photos OD (right eye)     OCT Retina Spectralis OD (right eye)     Diana-Operative Worksheet (Retina)        Primary Care Provider Fax #    Physician No Ref-Primary [phone]       No address on file        Equal Access to Services     [facility] Hospital[facility] : Hadii ping adams Sodesean, waaxda luqadaha, qaybta kaalmada lucia, leann stewart . So [facility] [phone].    ATENCIÓN: Si habla español, tiene a zee disposición servicios gratuitos de asistencia lingüística. Llame al [phone].    We comply with applicable federal civil rights laws and [place] laws. We do not discriminate on the basis of race, color, national origin, age, disability, sex, sexual orientation, or gender identity.            Thank you!     Thank you for choosing EYE CLINIC  for your care. Our goal is always to provide you with excellent care. Hearing back from our patients is one way we can continue to improve our services. Please take a few minutes to complete the written survey that you may receive in the mail after your visit with us. Thank you!             Your Updated Medication List - Protect others around you: Learn how  to safely use, store and throw away your medicines at www.disposemymeds.org.          This list is accurate as of 11/19/18  4:26 PM.  Always use your most recent med list.                   Brand Name Dispense Instructions for use Diagnosis    moxifloxacin 0.5 % ophthalmic solution    VIGAMOX    1 Bottle    Place 1 drop into the right eye 4 times daily    Acute endophthalmitis of right eye       neomycin-polymyxin-dexamethasone [id number] Oint ophthalmic ointment    MAXITROL     Place 0.5 inches into the right eye At Bedtime        prednisoLONE acetate 1 % ophthalmic susp    PRED FORTE    1 Bottle    Place 1 drop into the right eye every 4 hours    S/P eye surgery       TYLENOL PO      Take 650 mg by mouth every 4 hours as needed for mild pain or fever

## 2018-11-19 NOTE — PROGRESS NOTES
CC -   endophthalmitis    INTERVAL HISTORY - Initial visit with me, VA improving, no pain, better    HPI -   Otoniel Murry is a  38 year old year-old patient with history of large metallic IOFB OD after hitting metal with hammer [place] his [place], s/p removal 11/2018      PAST OCULAR SURGERY  PPV/PPL/IOFB removal/OGR/ABx OD 11/2/18  ()  PPV/Vit Bx/ABx OD 11/4/18 ()      RETINAL IMAGING:   OCT 11-19-18  OD - mild CME, mild outer retinal irregularities    ASSESSMENT & PLAN    1.  Endophthalmitis OD   - after IOFB on farm injury   - B. cereus on culture   - s/p PPV/ABx x 2   - finished oral cipro    - on PF 4/day, moxi 4/day, maxitrol at bedtime   - likely resolved      2.  RD OD   - limited area on exam today   - anterior, has some laser pexy posterior   - given history and new tear/RD seen today advise PPV/SBP/SOvsFGx   - will schedule tomorrow with      - r/b/a d/w patient: vision loss, blindness, infection, bleeding   - retinal detachment, need for more surgeries, need for gas or oil bubble and bubble restrictions   - cataract, diplopia, refractive change   - persistent blurriness, distortion, or scotoma   - participation of fellow or resident        return to clinic: post-op with       ATTESTATION     Attending Attestation:     Complete documentation of historical and exam elements from today's encounter can be found in the full encounter summary report (not reduplicated in this progress note).  I personally obtained the chief complaint(s) and history of present illness.  I confirmed and edited as necessary the review of systems, past medical/surgical history, family history, social history, and examination findings as documented by others; and I examined the patient myself.  I personally reviewed the relevant tests, images, and reports as documented above.  I formulated and edited as necessary the assessment and plan and discussed the findings and management plan with the patient and family    Malina  MD Seun, PhD  , Vitreoretinal Surgery  Department of Ophthalmology  [facility]

## 2018-11-19 NOTE — NURSING NOTE
Chief Complaints and History of Present Illnesses   Patient presents with     Post Op (Ophthalmology) Right Eye     status post RIGHT 25 gauge vitectomy, posterior hyaloid peel; vitreous biopsy, intraocular antibiotics right eye 11-5-18     HPI    Symptoms:           Do you have eye pain now?:  No      Comments:  POP right eye status post RIGHT 25 gauge vitectomy, posterior hyaloid peel; vitreous biopsy, intraocular antibiotics right eye 11-5-18.    EROS Pal 10:33 AM 11/19/2018

## 2018-11-20 ENCOUNTER — HOSPITAL ENCOUNTER (OUTPATIENT)
Facility: AMBULATORY SURGERY CENTER | Age: 38
End: 2018-11-20
Attending: OPHTHALMOLOGY
Payer: COMMERCIAL

## 2018-11-20 ENCOUNTER — ANESTHESIA (OUTPATIENT)
Dept: SURGERY | Facility: AMBULATORY SURGERY CENTER | Age: 38
End: 2018-11-20

## 2018-11-20 ENCOUNTER — ANESTHESIA EVENT (OUTPATIENT)
Dept: SURGERY | Facility: AMBULATORY SURGERY CENTER | Age: 38
End: 2018-11-20

## 2018-11-20 ENCOUNTER — SURGERY (OUTPATIENT)
Age: 38
End: 2018-11-20

## 2018-11-20 VITALS
TEMPERATURE: 98.2 F | OXYGEN SATURATION: 99 % | RESPIRATION RATE: 16 BRPM | SYSTOLIC BLOOD PRESSURE: 132 MMHG | HEART RATE: 54 BPM | DIASTOLIC BLOOD PRESSURE: 72 MMHG

## 2018-11-20 DIAGNOSIS — Z98.890 S/P EYE SURGERY: Primary | ICD-10-CM

## 2018-11-20 DEVICE — EYE IMP SLEEVE OVAL STYLE 3084 S3084: Type: IMPLANTABLE DEVICE | Site: EYE | Status: FUNCTIONAL

## 2018-11-20 DEVICE — IMPLANTABLE DEVICE
Type: IMPLANTABLE DEVICE | Site: EYE | Status: NON-FUNCTIONAL
Removed: 2019-07-09

## 2018-11-20 RX ORDER — SODIUM CHLORIDE, SODIUM LACTATE, POTASSIUM CHLORIDE, CALCIUM CHLORIDE 600; 310; 30; 20 MG/100ML; MG/100ML; MG/100ML; MG/100ML
INJECTION, SOLUTION INTRAVENOUS CONTINUOUS
Status: DISCONTINUED | OUTPATIENT
Start: 2018-11-20 | End: 2018-11-20 | Stop reason: HOSPADM

## 2018-11-20 RX ORDER — SODIUM CHLORIDE, SODIUM LACTATE, POTASSIUM CHLORIDE, CALCIUM CHLORIDE 600; 310; 30; 20 MG/100ML; MG/100ML; MG/100ML; MG/100ML
INJECTION, SOLUTION INTRAVENOUS CONTINUOUS
Status: DISCONTINUED | OUTPATIENT
Start: 2018-11-20 | End: 2018-11-21 | Stop reason: HOSPADM

## 2018-11-20 RX ORDER — GLYCOPYRROLATE 0.2 MG/ML
INJECTION, SOLUTION INTRAMUSCULAR; INTRAVENOUS PRN
Status: DISCONTINUED | OUTPATIENT
Start: 2018-11-20 | End: 2018-11-20

## 2018-11-20 RX ORDER — LIDOCAINE 40 MG/G
CREAM TOPICAL
Status: DISCONTINUED | OUTPATIENT
Start: 2018-11-20 | End: 2018-11-20 | Stop reason: HOSPADM

## 2018-11-20 RX ORDER — FENTANYL CITRATE 50 UG/ML
INJECTION, SOLUTION INTRAMUSCULAR; INTRAVENOUS PRN
Status: DISCONTINUED | OUTPATIENT
Start: 2018-11-20 | End: 2018-11-20

## 2018-11-20 RX ORDER — NEOMYCIN SULFATE, POLYMYXIN B SULFATE AND DEXAMETHASONE 3.5; 10000; 1 MG/ML; [USP'U]/ML; MG/ML
SUSPENSION/ DROPS OPHTHALMIC PRN
Status: DISCONTINUED | OUTPATIENT
Start: 2018-11-20 | End: 2018-11-20 | Stop reason: HOSPADM

## 2018-11-20 RX ORDER — ACETAMINOPHEN 325 MG/1
975 TABLET ORAL ONCE
Status: COMPLETED | OUTPATIENT
Start: 2018-11-20 | End: 2018-11-20

## 2018-11-20 RX ORDER — PHENYLEPHRINE HYDROCHLORIDE 25 MG/ML
1 SOLUTION/ DROPS OPHTHALMIC
Status: COMPLETED | OUTPATIENT
Start: 2018-11-20 | End: 2018-11-20

## 2018-11-20 RX ORDER — LIDOCAINE HYDROCHLORIDE 20 MG/ML
INJECTION, SOLUTION INFILTRATION; PERINEURAL PRN
Status: DISCONTINUED | OUTPATIENT
Start: 2018-11-20 | End: 2018-11-20

## 2018-11-20 RX ORDER — FENTANYL CITRATE 50 UG/ML
25-50 INJECTION, SOLUTION INTRAMUSCULAR; INTRAVENOUS
Status: DISCONTINUED | OUTPATIENT
Start: 2018-11-20 | End: 2018-11-21 | Stop reason: HOSPADM

## 2018-11-20 RX ORDER — NALOXONE HYDROCHLORIDE 0.4 MG/ML
.1-.4 INJECTION, SOLUTION INTRAMUSCULAR; INTRAVENOUS; SUBCUTANEOUS
Status: DISCONTINUED | OUTPATIENT
Start: 2018-11-20 | End: 2018-11-21 | Stop reason: HOSPADM

## 2018-11-20 RX ORDER — PROPARACAINE HYDROCHLORIDE 5 MG/ML
1 SOLUTION/ DROPS OPHTHALMIC ONCE
Status: COMPLETED | OUTPATIENT
Start: 2018-11-20 | End: 2018-11-20

## 2018-11-20 RX ORDER — PROPOFOL 10 MG/ML
INJECTION, EMULSION INTRAVENOUS CONTINUOUS PRN
Status: DISCONTINUED | OUTPATIENT
Start: 2018-11-20 | End: 2018-11-20

## 2018-11-20 RX ORDER — ONDANSETRON 4 MG/1
4 TABLET, ORALLY DISINTEGRATING ORAL EVERY 30 MIN PRN
Status: DISCONTINUED | OUTPATIENT
Start: 2018-11-20 | End: 2018-11-21 | Stop reason: HOSPADM

## 2018-11-20 RX ORDER — TETRACAINE HYDROCHLORIDE 5 MG/ML
1-2 SOLUTION OPHTHALMIC ONCE
Status: DISCONTINUED | OUTPATIENT
Start: 2018-11-20 | End: 2018-11-20 | Stop reason: CLARIF

## 2018-11-20 RX ORDER — ONDANSETRON 2 MG/ML
INJECTION INTRAMUSCULAR; INTRAVENOUS PRN
Status: DISCONTINUED | OUTPATIENT
Start: 2018-11-20 | End: 2018-11-20

## 2018-11-20 RX ORDER — HYDROMORPHONE HYDROCHLORIDE 1 MG/ML
.3-.5 INJECTION, SOLUTION INTRAMUSCULAR; INTRAVENOUS; SUBCUTANEOUS EVERY 10 MIN PRN
Status: DISCONTINUED | OUTPATIENT
Start: 2018-11-20 | End: 2018-11-21 | Stop reason: HOSPADM

## 2018-11-20 RX ORDER — NEOMYCIN POLYMYXIN B SULFATES AND DEXAMETHASONE 3.5; 10000; 1 MG/ML; [USP'U]/ML; MG/ML
1 SUSPENSION/ DROPS OPHTHALMIC 4 TIMES DAILY
Qty: 5 ML | Refills: 0 | Status: SHIPPED | OUTPATIENT
Start: 2018-11-20 | End: 2019-01-29

## 2018-11-20 RX ORDER — FENTANYL CITRATE 50 UG/ML
25-50 INJECTION, SOLUTION INTRAMUSCULAR; INTRAVENOUS
Status: DISCONTINUED | OUTPATIENT
Start: 2018-11-20 | End: 2018-11-20 | Stop reason: HOSPADM

## 2018-11-20 RX ORDER — MEPERIDINE HYDROCHLORIDE 25 MG/ML
12.5 INJECTION INTRAMUSCULAR; INTRAVENOUS; SUBCUTANEOUS
Status: DISCONTINUED | OUTPATIENT
Start: 2018-11-20 | End: 2018-11-21 | Stop reason: HOSPADM

## 2018-11-20 RX ORDER — ONDANSETRON 2 MG/ML
4 INJECTION INTRAMUSCULAR; INTRAVENOUS EVERY 30 MIN PRN
Status: DISCONTINUED | OUTPATIENT
Start: 2018-11-20 | End: 2018-11-21 | Stop reason: HOSPADM

## 2018-11-20 RX ORDER — DEXAMETHASONE SODIUM PHOSPHATE 4 MG/ML
INJECTION, SOLUTION INTRA-ARTICULAR; INTRALESIONAL; INTRAMUSCULAR; INTRAVENOUS; SOFT TISSUE PRN
Status: DISCONTINUED | OUTPATIENT
Start: 2018-11-20 | End: 2018-11-20 | Stop reason: HOSPADM

## 2018-11-20 RX ORDER — ATROPINE SULFATE 10 MG/ML
SOLUTION/ DROPS OPHTHALMIC PRN
Status: DISCONTINUED | OUTPATIENT
Start: 2018-11-20 | End: 2018-11-20 | Stop reason: HOSPADM

## 2018-11-20 RX ORDER — BALANCED SALT SOLUTION 6.4; .75; .48; .3; 3.9; 1.7 MG/ML; MG/ML; MG/ML; MG/ML; MG/ML; MG/ML
SOLUTION OPHTHALMIC PRN
Status: DISCONTINUED | OUTPATIENT
Start: 2018-11-20 | End: 2018-11-20 | Stop reason: HOSPADM

## 2018-11-20 RX ORDER — TROPICAMIDE 10 MG/ML
1 SOLUTION/ DROPS OPHTHALMIC
Status: COMPLETED | OUTPATIENT
Start: 2018-11-20 | End: 2018-11-20

## 2018-11-20 RX ORDER — CYCLOPENTOLATE HYDROCHLORIDE 10 MG/ML
1 SOLUTION/ DROPS OPHTHALMIC
Status: COMPLETED | OUTPATIENT
Start: 2018-11-20 | End: 2018-11-20

## 2018-11-20 RX ORDER — OXYCODONE HCL 5 MG/5 ML
5 SOLUTION, ORAL ORAL EVERY 4 HOURS PRN
Status: DISCONTINUED | OUTPATIENT
Start: 2018-11-20 | End: 2018-11-21 | Stop reason: HOSPADM

## 2018-11-20 RX ADMIN — BALANCED SALT SOLUTION 15 ML: 6.4; .75; .48; .3; 3.9; 1.7 SOLUTION OPHTHALMIC at 13:43

## 2018-11-20 RX ADMIN — Medication 500 ML: at 13:43

## 2018-11-20 RX ADMIN — FENTANYL CITRATE 25 MCG: 50 INJECTION, SOLUTION INTRAMUSCULAR; INTRAVENOUS at 15:18

## 2018-11-20 RX ADMIN — ACETAMINOPHEN 975 MG: 325 TABLET ORAL at 12:23

## 2018-11-20 RX ADMIN — SODIUM CHLORIDE, SODIUM LACTATE, POTASSIUM CHLORIDE, CALCIUM CHLORIDE 25 ML/HR: 600; 310; 30; 20 INJECTION, SOLUTION INTRAVENOUS at 12:32

## 2018-11-20 RX ADMIN — FENTANYL CITRATE 25 MCG: 50 INJECTION, SOLUTION INTRAMUSCULAR; INTRAVENOUS at 15:20

## 2018-11-20 RX ADMIN — FENTANYL CITRATE 25 MCG: 50 INJECTION, SOLUTION INTRAMUSCULAR; INTRAVENOUS at 13:19

## 2018-11-20 RX ADMIN — FENTANYL CITRATE 25 MCG: 50 INJECTION, SOLUTION INTRAMUSCULAR; INTRAVENOUS at 13:22

## 2018-11-20 RX ADMIN — TROPICAMIDE 1 DROP: 10 SOLUTION/ DROPS OPHTHALMIC at 12:18

## 2018-11-20 RX ADMIN — GLYCOPYRROLATE 0.2 MG: 0.2 INJECTION, SOLUTION INTRAMUSCULAR; INTRAVENOUS at 13:19

## 2018-11-20 RX ADMIN — CYCLOPENTOLATE HYDROCHLORIDE 1 DROP: 10 SOLUTION/ DROPS OPHTHALMIC at 12:08

## 2018-11-20 RX ADMIN — PROPARACAINE HYDROCHLORIDE 1 DROP: 5 SOLUTION/ DROPS OPHTHALMIC at 12:04

## 2018-11-20 RX ADMIN — Medication 0.2 ML: at 13:47

## 2018-11-20 RX ADMIN — PHENYLEPHRINE HYDROCHLORIDE 1 DROP: 25 SOLUTION/ DROPS OPHTHALMIC at 12:13

## 2018-11-20 RX ADMIN — PHENYLEPHRINE HYDROCHLORIDE 1 DROP: 25 SOLUTION/ DROPS OPHTHALMIC at 12:18

## 2018-11-20 RX ADMIN — ATROPINE SULFATE 1 DROP: 10 SOLUTION/ DROPS OPHTHALMIC at 13:40

## 2018-11-20 RX ADMIN — TROPICAMIDE 1 DROP: 10 SOLUTION/ DROPS OPHTHALMIC at 12:13

## 2018-11-20 RX ADMIN — ONDANSETRON 4 MG: 2 INJECTION INTRAMUSCULAR; INTRAVENOUS at 13:30

## 2018-11-20 RX ADMIN — NEOMYCIN SULFATE, POLYMYXIN B SULFATE AND DEXAMETHASONE 20 DROP: 3.5; 10000; 1 SUSPENSION/ DROPS OPHTHALMIC at 13:51

## 2018-11-20 RX ADMIN — SODIUM CHLORIDE, SODIUM LACTATE, POTASSIUM CHLORIDE, CALCIUM CHLORIDE: 600; 310; 30; 20 INJECTION, SOLUTION INTRAVENOUS at 13:10

## 2018-11-20 RX ADMIN — PROPOFOL 50 MCG/KG/MIN: 10 INJECTION, EMULSION INTRAVENOUS at 13:18

## 2018-11-20 RX ADMIN — CYCLOPENTOLATE HYDROCHLORIDE 1 DROP: 10 SOLUTION/ DROPS OPHTHALMIC at 12:18

## 2018-11-20 RX ADMIN — LIDOCAINE HYDROCHLORIDE 50 MG: 20 INJECTION, SOLUTION INFILTRATION; PERINEURAL at 13:16

## 2018-11-20 RX ADMIN — CYCLOPENTOLATE HYDROCHLORIDE 1 DROP: 10 SOLUTION/ DROPS OPHTHALMIC at 12:13

## 2018-11-20 RX ADMIN — DEXAMETHASONE SODIUM PHOSPHATE 0.5 ML: 4 INJECTION, SOLUTION INTRA-ARTICULAR; INTRALESIONAL; INTRAMUSCULAR; INTRAVENOUS; SOFT TISSUE at 13:46

## 2018-11-20 RX ADMIN — Medication 0.5 ML: at 13:44

## 2018-11-20 RX ADMIN — TROPICAMIDE 1 DROP: 10 SOLUTION/ DROPS OPHTHALMIC at 12:08

## 2018-11-20 RX ADMIN — PHENYLEPHRINE HYDROCHLORIDE 1 DROP: 25 SOLUTION/ DROPS OPHTHALMIC at 12:08

## 2018-11-20 ASSESSMENT — LIFESTYLE VARIABLES: TOBACCO_USE: 0

## 2018-11-20 NOTE — ANESTHESIA CARE TRANSFER NOTE
Patient: Otoniel Murry    Procedure(s):  Right Eye 25g [place] Vitrectomy, Membrane Peel, retinectomy, Fluid/Air Exchange, Silicone Oil , Scleral Buckle, Endolaser    Diagnosis: Retinal Detachment  Diagnosis Additional Information: No value filed.    Anesthesia Type:   General, ETT     Note:  Airway :Room Air  Patient transferred to:Phase II  Comments: Arrive Phase II, Stable, Airway Intact  139/85, 63,14,98%  All questions answered.Handoff Report: Identifed the Patient, Identified the Reponsible Provider, Reviewed the pertinent medical history, Discussed the surgical course, Reviewed Intra-OP anesthesia mangement and issues during anesthesia, Set expectations for post-procedure period and Allowed opportunity for questions and acknowledgement of understanding      Vitals: (Last set prior to Anesthesia Care Transfer)    CRNA VITALS  11/20/2018 1500 - 11/20/2018 1533      11/20/2018             Pulse: 59    Ht Rate: 55    SpO2: 98 %    Resp Rate (set): 10                Electronically Signed By: DMITRIY Lambert CRNA  November 20, 2018  3:33 PM

## 2018-11-20 NOTE — DISCHARGE INSTRUCTIONS
[facility]  [phone]  Post Operative Eye Surgery Instructions    MD Tamera Natarajan MD  Will I have pain?  Some discomfort is normal and expected following surgery. The first few days after surgery you may need to use prescription pain pills. Taking Tylenol (acetaminophen) regularly may help prevent pain.  Discomfort should gradually decrease and Tylenol should be sufficient to relieve pain. A foreign body sensation in the cornea of the eye is very common and caused by sutures placed at surgery. These sutures will go away in one to two weeks. If the pain worsens, you should call the doctor.  Do I need to wear an eye patch?  You do not need to wear an eye patch at home after the doctor has removed the patch on your first day after surgery. However, you may be more comfortable wearing a patch outside in the sun, when sleeping or napping, or in a atif, windy environment.  How much drainage should I have?  You may expect a moderate amount of drainage for a week. Gradually the drainage should decrease. The lids can be cleaned with a clean washcloth and gentle soap or diluted baby shampoo. Wipe the eyelids gently from the nose outward. Some blood in the tears is a normal finding.  Will there be swelling?  Some swelling is normal for about a week or two after which it will gradually decrease. Applying a cool compress, using a clean washcloth for 5 - 10 minutes several times a day may reduce the swelling and make you more comfortable. People may have some swelling of both eyes, especially if face down positioning is required. The white part of the eye may appear very red or bloody for a week or two. This may get worse a few days after surgery. Though the bright red appearance can look frightening, it is a normal finding early after surgery and will resolve in a few weeks.  Will I need to use eye drops?  You will be using several different kinds of eye drops or ointment (salve) when you  leave the hospital. The directions will be on each bottle or tube. The medication with the red top will keep your eye dilated and may make your eye more sensitive to light. Wearing sunglasses may help. The other medication is a combination antibiotic/steroid to prevent infection and promote healing.  Occasionally a third drop is used to control the pressure in your eye. A new bottle of artificial tears or lubricant ointment may be used along with your prescription eye drops after surgery. You will be using drops from four to eight weeks. Bring all eye medications (drops. ointments, or pills) with you  to each visit.   Always wash your hands before putting in the eye drops. You may wish to have someone else help you. Pull down on the lower lid and squeeze one drop from the bottle being careful not to touch the dropper to your eye or eyelid. One drop is sufficient, but another may be used if the first did not go into the eye. It is often easier to put in the drops if you are reclining or lying down. Wait five (5) minutes after the first drop before using the second drop to allow the medications to absorb into the eye.  How long will it take for my vision to improve?  Your vision should gradually improve, but it may take up to six months to regain your best vision. Frequently, air or gas bubbles are injected into the eye at the time of surgery. This will blur your vision significantly at first. As the bubble becomes smaller it will cause a black line in your vision that moves as you move your head. As the bubble becomes smaller you may notice that it looks more like a bubble or that it will break up into several smaller bubbles. It will take from a few days to a few weeks for the bubble to dissolve and be replaced by clear fluid.  You may notice floaters or double vision after your surgery. These symptoms usually will decrease with time. If the double vision is bothersome patching the eye may help.  If you notice a  sudden worsening in your vision call your doctor.  Are there any physical restrictions after surgery?  If an air or gas bubble was placed in the eye during surgery, you will be asked to spend most of your time (both awake and during the night) with your head in a specific position, frequently face down. As the eye heals and the bubble dissolves there will be less of a need for you to stay in that specific position. You should avoid sleeping on your back until the bubble has totally dissolved and you have been given permission from your surgeon. You should not fly in an airplane or go to high altitudes in the mountains while there is a bubble in your eye. If you should require any other surgery, under general anesthesia, while you still have an air bubble in your eye, have your surgeon or anesthetist contact us prior to your surgery. Some anesthetic agents can make the bubble expand and seriously damage your eye.  Patients that have a gas/air bubble placed in their eye will have a green medical alert band on their wrist.  This band should not be removed until the doctor removes it for you or gives you permission to remove it.     Heavy lifting (greater than 50 pounds), swimming and contact sports should be avoided for about 3 to 4 weeks after surgery.  You may resume your usual sexual activities about one week after surgery.  When may I return to work or my normal activities?  Depending on the type or work, you may return to work within a few days. If your work involves physical activity or driving, you will need to restrict your activities and remain home longer.  You may watch television, look at magazines, or work puzzles. Reading may be uncomfortable for several days, but using the eyes will not cause any damage.  You may go outside as usual. If conditions are windy or atif, wear an eye pad to avoid getting dust or dirt in the eye.  Can I travel?  You cannot fly in an airplane or drive into the mountains as long  as the air or gas bubble remains in your eye.    Are there any driving restrictions?  Someone will need to drive you home from the hospital. Generally driving can be resumed in several days if you have good vision in your other eye. If you do not feel comfortable driving, do not drive! Your depth perception will be decreased so you will want to try driving during the day in light traffic until you feel comfortable driving. You should restrict your driving while you are taking prescription pain pills as they also can affect your judgment.  When can I shower and wash my hair?  You may shower or bathe when you get home, but avoid getting water in your eye. You may want someone to help you shampoo your hair at first.  You may shave, brush your teeth, or comb your hair. Do not use make-up, mascara, or creams/lotions around your eye for several weeks.  When will I see the doctor again?  Generally, you will be seen the first day after surgery and again 1-2 weeks later. If you have not received a return appointment before leaving the hospital, you should call our office during the business hours to arrange an appointment. If you will be seeing your local doctor instead of us, you will need to call that office to set up an appointment.    If you have a green armband on, your physician will instruct you as to how long you will have to wear it at your post-operative follow-up appointment.  How do I reach a doctor if I have concerns?  One of our doctors is available by calling [facility] [phone]. Please try to call for routine questions and prescription refills during business hours.  You should call your doctor if:  You notice a sudden decrease in your vision.  Have severe pain or pain increases rather than subsiding.  You notice a new black curtain over your eye that is not the gas bubble. If you have any of these symptoms, you may need to be examined.    [facility]  Home Care Following Anesthesia  For 24 hours after surgery:  1. Get plenty of rest.  A responsible adult must stay with you for at least 24 hours after you leave the surgery center.  2. Do not drive or use heavy equipment.  If you have weakness or tingling, don't drive or use heavy equipment until this feeling goes away.   3. Do not drink alcohol.   4. Avoid strenuous or risky activities.  Ask for help when climbing stairs.  5. You may feel lightheaded.  IF so, sit for a few minutes before standing.  Have someone help you get up.   6. If you have nausea (feel sick to your stomach): Drink only clear liquids such as apple juice, ginger ale, broth or 7-Up.  Rest may also help.  Be sure to drink enough fluids.  Move to a regular diet as you feel able.   7. You may have a slight fever.  Call the doctor if your fever is over 100 F (37.7 C) (taken under the tongue) or lasts longer than 24 hours.  8. You may have a dry mouth, a sore throat, muscle aches or trouble sleeping. These should go away after 24 hours.  9. Do not make important or legal decisions.       Tips for taking pain medications  To get the best pain relief possible, remember these points:    Take pain medications as directed, before pain becomes severe.    Pain medication can upset your stomach: taking it with food may help.    Constipation is a common side effect of pain medication. Drink plenty of  fluids.    Eat foods high in fiber. Take a stool softener if recommended by your doctor or pharmacist.    Do not drink alcohol, drive or operate machinery while taking pain medications.    Ask about other ways to control pain, such as with heat, ice or relaxation.    Tylenol/Acetaminophen Consumption  To help encourage the safe use of acetaminophen, the makers of TYLENOL  have lowered the maximum daily dose for single-ingredient Extra Strength TYLENOL  (acetaminophen) products sold in the [place] from 8 pills per day (4,000 mg) to 6 pills per day (3,000  mg). The dosing interval has also changed from 2 pills every 4-6 hours to 2 pills every 6 hours.    If you feel your pain relief is insufficient, you may take Tylenol/Acetaminophen in addition to your narcotic pain medication.     Be careful not to exceed 3,000 mg of Tylenol/Acetaminophen in a 24 hour period from all sources.    If you are taking extra strength Tylenol/acetaminophen (500 mg), the maximum dose is 6 tablets in 24 hours.    If you are taking regular strength acetaminophen (325 mg), the maximum dose is 9 tablets in 24 hours.    Last dose of Tylenol:  975 mg at 12:30 PM.  Next dose at 6:30 PM, if needed.  Follow package instructions.    Call a doctor for any of the followin. Signs of infection (fever, growing tenderness at the surgery site, a large amount of drainage or bleeding, severe pain, foul-smelling drainage, redness, swelling).  2. It has been over 8 to 10 hours since surgery and you are still not able to urinate (pass water).  3. Headache for over 24 hours.  Your doctor is:       Dr. Tamera Walsh, Ophthalmology: [phone]               Or dial [phone] and ask for the resident on call for:  Ophthalmology  For emergency care, call the:  [place] [facility]:  [phone] (TTY for hearing impaired: [phone])

## 2018-11-20 NOTE — IP AVS SNAPSHOT
MRN:[id number]                      After Visit Summary   11/20/2018    Otoniel Murry    MRN: [id number]           Thank you!     Thank you for choosing [facility] for your care. Our goal is always to provide you with excellent care. Hearing back from our patients is one way we can continue to improve our services. Please take a few minutes to complete the written survey that you may receive in the mail after you visit with us. Thank you!        Patient Information     Date Of Birth          1980        About your hospital stay     You were admitted on:  November 20, 2018 [facility]    You were discharged on:  November 20, 2018       Who to Call     For medical emergencies, please call 911.  For non-urgent questions about your medical care, please call your primary care provider or clinic, None  For questions related to your surgery, please call your surgery clinic        Attending Provider     Provider Tamera Blank MD Ophthalmology       Primary Care Provider Fax #    Physician No Ref-Primary [phone]      Your next 10 appointments already scheduled     Nov 21, 2018  9:15 AM CST   RETURN RETINA with Tamera Walsh MD   Eye Clinic ([facility])    [address]  [facility] [address]   [phone]              Further instructions from your care team       [facility]  [phone]  Post Operative Eye Surgery Instructions    MD Tamera Natarajan MD  Will I have pain?  Some discomfort is normal and expected following surgery. The first few days after surgery you may need to use prescription pain pills. Taking Tylenol (acetaminophen) regularly may help prevent pain.  Discomfort should gradually decrease and Tylenol should be sufficient to relieve pain. A foreign body sensation in the cornea of the eye is very  common and caused by sutures placed at surgery. These sutures will go away in one to two weeks. If the pain worsens, you should call the doctor.  Do I need to wear an eye patch?  You do not need to wear an eye patch at home after the doctor has removed the patch on your first day after surgery. However, you may be more comfortable wearing a patch outside in the sun, when sleeping or napping, or in a atif, windy environment.  How much drainage should I have?  You may expect a moderate amount of drainage for a week. Gradually the drainage should decrease. The lids can be cleaned with a clean washcloth and gentle soap or diluted baby shampoo. Wipe the eyelids gently from the nose outward. Some blood in the tears is a normal finding.  Will there be swelling?  Some swelling is normal for about a week or two after which it will gradually decrease. Applying a cool compress, using a clean washcloth for 5 - 10 minutes several times a day may reduce the swelling and make you more comfortable. People may have some swelling of both eyes, especially if face down positioning is required. The white part of the eye may appear very red or bloody for a week or two. This may get worse a few days after surgery. Though the bright red appearance can look frightening, it is a normal finding early after surgery and will resolve in a few weeks.  Will I need to use eye drops?  You will be using several different kinds of eye drops or ointment (salve) when you leave the hospital. The directions will be on each bottle or tube. The medication with the red top will keep your eye dilated and may make your eye more sensitive to light. Wearing sunglasses may help. The other medication is a combination antibiotic/steroid to prevent infection and promote healing.  Occasionally a third drop is used to control the pressure in your eye. A new bottle of artificial tears or lubricant ointment may be used along with your prescription eye drops after  surgery. You will be using drops from four to eight weeks. Bring all eye medications (drops. ointments, or pills) with you  to each visit.   Always wash your hands before putting in the eye drops. You may wish to have someone else help you. Pull down on the lower lid and squeeze one drop from the bottle being careful not to touch the dropper to your eye or eyelid. One drop is sufficient, but another may be used if the first did not go into the eye. It is often easier to put in the drops if you are reclining or lying down. Wait five (5) minutes after the first drop before using the second drop to allow the medications to absorb into the eye.  How long will it take for my vision to improve?  Your vision should gradually improve, but it may take up to six months to regain your best vision. Frequently, air or gas bubbles are injected into the eye at the time of surgery. This will blur your vision significantly at first. As the bubble becomes smaller it will cause a black line in your vision that moves as you move your head. As the bubble becomes smaller you may notice that it looks more like a bubble or that it will break up into several smaller bubbles. It will take from a few days to a few weeks for the bubble to dissolve and be replaced by clear fluid.  You may notice floaters or double vision after your surgery. These symptoms usually will decrease with time. If the double vision is bothersome patching the eye may help.  If you notice a sudden worsening in your vision call your doctor.  Are there any physical restrictions after surgery?  If an air or gas bubble was placed in the eye during surgery, you will be asked to spend most of your time (both awake and during the night) with your head in a specific position, frequently face down. As the eye heals and the bubble dissolves there will be less of a need for you to stay in that specific position. You should avoid sleeping on your back until the bubble has totally  dissolved and you have been given permission from your surgeon. You should not fly in an airplane or go to high altitudes in the mountains while there is a bubble in your eye. If you should require any other surgery, under general anesthesia, while you still have an air bubble in your eye, have your surgeon or anesthetist contact us prior to your surgery. Some anesthetic agents can make the bubble expand and seriously damage your eye.  Patients that have a gas/air bubble placed in their eye will have a green medical alert band on their wrist.  This band should not be removed until the doctor removes it for you or gives you permission to remove it.     Heavy lifting (greater than 50 pounds), swimming and contact sports should be avoided for about 3 to 4 weeks after surgery.  You may resume your usual sexual activities about one week after surgery.  When may I return to work or my normal activities?  Depending on the type or work, you may return to work within a few days. If your work involves physical activity or driving, you will need to restrict your activities and remain home longer.  You may watch television, look at magazines, or work puzzles. Reading may be uncomfortable for several days, but using the eyes will not cause any damage.  You may go outside as usual. If conditions are windy or atif, wear an eye pad to avoid getting dust or dirt in the eye.  Can I travel?  You cannot fly in an airplane or drive into the mountains as long as the air or gas bubble remains in your eye.    Are there any driving restrictions?  Someone will need to drive you home from the hospital. Generally driving can be resumed in several days if you have good vision in your other eye. If you do not feel comfortable driving, do not drive! Your depth perception will be decreased so you will want to try driving during the day in light traffic until you feel comfortable driving. You should restrict your driving while you are taking  prescription pain pills as they also can affect your judgment.  When can I shower and wash my hair?  You may shower or bathe when you get home, but avoid getting water in your eye. You may want someone to help you shampoo your hair at first.  You may shave, brush your teeth, or comb your hair. Do not use make-up, mascara, or creams/lotions around your eye for several weeks.  When will I see the doctor again?  Generally, you will be seen the first day after surgery and again 1-2 weeks later. If you have not received a return appointment before leaving the hospital, you should call our office during the business hours to arrange an appointment. If you will be seeing your local doctor instead of us, you will need to call that office to set up an appointment.    If you have a green armband on, your physician will instruct you as to how long you will have to wear it at your post-operative follow-up appointment.  How do I reach a doctor if I have concerns?  One of our doctors is available by calling [facility] [phone]. Please try to call for routine questions and prescription refills during business hours.  You should call your doctor if:  You notice a sudden decrease in your vision.  Have severe pain or pain increases rather than subsiding.  You notice a new black curtain over your eye that is not the gas bubble. If you have any of these symptoms, you may need to be examined.    [facility]  Home Care Following Anesthesia  For 24 hours after surgery:  1. Get plenty of rest.  A responsible adult must stay with you for at least 24 hours after you leave the surgery center.  2. Do not drive or use heavy equipment.  If you have weakness or tingling, don't drive or use heavy equipment until this feeling goes away.   3. Do not drink alcohol.   4. Avoid strenuous or risky activities.  Ask for help when climbing stairs.  5. You may feel lightheaded.  IF so, sit for  a few minutes before standing.  Have someone help you get up.   6. If you have nausea (feel sick to your stomach): Drink only clear liquids such as apple juice, ginger ale, broth or 7-Up.  Rest may also help.  Be sure to drink enough fluids.  Move to a regular diet as you feel able.   7. You may have a slight fever.  Call the doctor if your fever is over 100 F (37.7 C) (taken under the tongue) or lasts longer than 24 hours.  8. You may have a dry mouth, a sore throat, muscle aches or trouble sleeping. These should go away after 24 hours.  9. Do not make important or legal decisions.       Tips for taking pain medications  To get the best pain relief possible, remember these points:    Take pain medications as directed, before pain becomes severe.    Pain medication can upset your stomach: taking it with food may help.    Constipation is a common side effect of pain medication. Drink plenty of  fluids.    Eat foods high in fiber. Take a stool softener if recommended by your doctor or pharmacist.    Do not drink alcohol, drive or operate machinery while taking pain medications.    Ask about other ways to control pain, such as with heat, ice or relaxation.    Tylenol/Acetaminophen Consumption  To help encourage the safe use of acetaminophen, the makers of TYLENOL  have lowered the maximum daily dose for single-ingredient Extra Strength TYLENOL  (acetaminophen) products sold in the [place] from 8 pills per day (4,000 mg) to 6 pills per day (3,000 mg). The dosing interval has also changed from 2 pills every 4-6 hours to 2 pills every 6 hours.    If you feel your pain relief is insufficient, you may take Tylenol/Acetaminophen in addition to your narcotic pain medication.     Be careful not to exceed 3,000 mg of Tylenol/Acetaminophen in a 24 hour period from all sources.    If you are taking extra strength Tylenol/acetaminophen (500 mg), the maximum dose is 6 tablets in 24 hours.    If you are taking regular strength  acetaminophen (325 mg), the maximum dose is 9 tablets in 24 hours.    Last dose of Tylenol:  975 mg at 12:30 PM.  Next dose at 6:30 PM, if needed.  Follow package instructions.    Call a doctor for any of the followin. Signs of infection (fever, growing tenderness at the surgery site, a large amount of drainage or bleeding, severe pain, foul-smelling drainage, redness, swelling).  2. It has been over 8 to 10 hours since surgery and you are still not able to urinate (pass water).  3. Headache for over 24 hours.  Your doctor is:       Dr. Tamera Walsh, Ophthalmology: [phone]               Or dial [phone] and ask for the resident on call for:  Ophthalmology  For emergency care, call the:  [facility]:  [phone] (TTY for hearing impaired: [phone])    Pending Results     No orders found from 2018 to 2018.            Admission Information     Date & Time Provider Department Dept. Phone    2018 Tamera Walsh MD [facility] [phone]      Your Vitals Were     Blood Pressure Temperature Respirations Pulse Oximetry          136/89 97.8  F (36.6  C) (Oral) 16 97%        MyChart Information     [employer] is an electronic gateway that provides easy, online access to your medical records. With [employer], you can request a clinic appointment, read your test results, renew a prescription or communicate with your care team.     To sign up for [employer] visit the website at [email or web address]   You will be asked to enter the access code listed below, as well as some personal information. Please follow the directions to create your username and password.     Your access code is: [email or web address]  Expires: 2019  5:07 PM     Your access code will  in 90 days. If you need help or a new code, please contact your [facility] or call [phone] for assistance.        Care EveryWhere ID     This  is your Care EveryWhere ID. This could be used by other organizations to access your [facility] medical records  [id number]        Equal Access to Services     KHANG RODRIGUEZ : Hadii aad ku hadradhalobo Giorgi, wahannyda lugeovannisherman, abby kareglada lucia, leann zunigadoris rocio So [facility] [phone].    ATENCIÓN: Si habla español, tiene a zee disposición servicios gratuitos de asistencia lingüística. Llame al [phone].    We comply with applicable federal civil rights laws and [place] laws. We do not discriminate on the basis of race, color, national origin, age, disability, sex, sexual orientation, or gender identity.               Review of your medicines      UNREVIEWED medicines. Ask your doctor about these medicines        Dose / Directions    moxifloxacin 0.5 % ophthalmic solution   Commonly known as:  VIGAMOX   Used for:  Acute endophthalmitis of right eye        Dose:  1 drop   Place 1 drop into the right eye 4 times daily   Quantity:  1 Bottle   Refills:  1       neomycin-polymyxin-dexamethasone 3.5-14264-3.1 Oint ophthalmic ointment   Commonly known as:  MAXITROL        Dose:  0.5 inch   Place 0.5 inches into the right eye At Bedtime   Refills:  0       prednisoLONE acetate 1 % ophthalmic susp   Commonly known as:  PRED FORTE   Used for:  S/P eye surgery        Dose:  1 drop   Place 1 drop into the right eye every 4 hours   Quantity:  1 Bottle   Refills:  0       TYLENOL PO        Dose:  650 mg   Take 650 mg by mouth every 4 hours as needed for mild pain or fever   Refills:  0                Protect others around you: Learn how to safely use, store and throw away your medicines at www.disposemymeds.org.             Medication List: This is a list of all your medications and when to take them. Check marks below indicate your daily home schedule. Keep this list as a reference.      Medications           Morning Afternoon Evening Bedtime As Needed    moxifloxacin 0.5 % ophthalmic solution    Commonly known as:  VIGAMOX   Place 1 drop into the right eye 4 times daily                                neomycin-polymyxin-dexamethasone 3.5-58556-8.1 Oint ophthalmic ointment   Commonly known as:  MAXITROL   Place 0.5 inches into the right eye At Bedtime   Last time this was given:  1 g on 11/20/2018  1:47 PM                                prednisoLONE acetate 1 % ophthalmic susp   Commonly known as:  PRED FORTE   Place 1 drop into the right eye every 4 hours                                TYLENOL PO   Take 650 mg by mouth every 4 hours as needed for mild pain or fever   Last time this was given:  975 mg on 11/20/2018 12:23 PM

## 2018-11-20 NOTE — ANESTHESIA PREPROCEDURE EVALUATION
Anesthesia Evaluation     . Pt has had prior anesthetic.     No history of anesthetic complications          ROS/MED HX    ENT/Pulmonary:  - neg pulmonary ROS    (-) tobacco use, asthma and sleep apnea   Neurologic:       Cardiovascular:  - neg cardiovascular ROS       METS/Exercise Tolerance:  >4 METS   Hematologic:         Musculoskeletal:         GI/Hepatic:  - neg GI/hepatic ROS      (-) GERD   Renal/Genitourinary:  - ROS Renal section negative       Endo:  - neg endo ROS       Psychiatric:         Infectious Disease:  - neg infectious disease ROS       Malignancy:      - no malignancy   Other:                     Physical Exam  Normal systems: dental    Airway   Mallampati: II  TM distance: >3 FB  Neck ROM: full    Dental     Cardiovascular   Rhythm and rate: regular      Pulmonary    breath sounds clear to auscultation                    Anesthesia Plan      History & Physical Review  History and physical reviewed and following examination; no interval change.    ASA Status:  1 .        Plan for General and ETT with Intravenous induction. Maintenance will be Balanced.    PONV prophylaxis:  Ondansetron (or other 5HT-3)  Additional equipment: Videolaryngoscope      Postoperative Care  Postoperative pain management:  IV analgesics.      Consents  Anesthetic plan, risks, benefits and alternatives discussed with:  Patient..                          .    KAYCE FV AN PHYSICAL EXAM    Assessment:   ASA SCORE: 1    NPO Status: > 6 hours since completed Solid Foods        Tobacco Use:  NO Active use of Tobacco/UNKNOWN Tobacco use status     Plan:   Anes. Type:  MAC   Pre-Induction: Acetaminophen PO   Induction:  Not applicable      Access/Monitoring: PIV   Maintenance: IV   Emergence: Recovery Site (PACU/ICU)   Logistics: Same Day Surgery     Postop Pain/Sedation Strategy:  Standard-Options: Opioids PRN     PONV Management:  Adult Risk Factors:, Non-Smoker, Postop Opioids  Prevention: Ondansetron     CONSENT: Direct  conversation   Plan and risks discussed with: Patient   Blood Products: Consent Deferred (Minimal Blood Loss)     Comments for Plan/Consent:  Plan:  MAC with routine monitors    Nick Harrell MD

## 2018-11-20 NOTE — IP AVS SNAPSHOT
[facility]    [address] [place] [id number]    Phone:  [phone]    Fax:  [phone]                                       After Visit Summary   11/20/2018    Otoniel Murry    MRN: [id number]           After Visit Summary Signature Page     I have received my discharge instructions, and my questions have been answered. I have discussed any challenges I see with this plan with the nurse or doctor.    ..........................................................................................................................................  Patient/Patient Representative Signature      ..........................................................................................................................................  Patient Representative Print Name and Relationship to Patient    ..................................................               ................................................  Date                                   Time    ..........................................................................................................................................  Reviewed by Signature/Title    ...................................................              ..............................................  Date                                               Time          22EPIC Rev 08/18

## 2018-11-20 NOTE — BRIEF OP NOTE
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

## 2018-11-20 NOTE — ANESTHESIA POSTPROCEDURE EVALUATION
Anesthesia POST Procedure Evaluation    Patient: Otoniel Murry   MRN:     [id number] Gender:   male   Age:    38 year old :      1980        Preoperative Diagnosis: Retinal Detachment   Procedure(s):  Right Eye 25g Pars Plana Vitrectomy, Membrane Peel, retinectomy, Fluid/Air Exchange, Silicone Oil , Scleral Buckle, Endolaser   Postop Comments: No value filed.       Anesthesia Type:  MAC    Reportable Event: NO     PAIN: Uncomplicated   Sign Out status: Comfortable, Well controlled pain     PONV: No PONV   Sign Out status:  No Nausea or Vomiting     Neuro/Psych: Uneventful perioperative course   Sign Out Status: Preoperative baseline; Age appropriate mentation     Airway/Resp.: Uneventful perioperative course   Sign Out Status: Non labored breathing, age appropriate RR; Resp. Status within EXPECTED Parameters     CV: Uneventful perioperative course   Sign Out status: Appropriate BP and perfusion indices; Appropriate HR/Rhythm     Disposition:   Sign Out in:  PACU  Disposition:  Phase II; Home  Recovery Course: Uneventful  Follow-Up: Not required           Last Anesthesia Record Vitals:  CRNA VITALS  2018 1500 - 2018 1546      2018             Pulse: 59    Ht Rate: 55    SpO2: 98 %    Resp Rate (set): 10          Last PACU/Preop Vitals:  Vitals:    18 1129 18 1535   BP: 136/89 139/85   Pulse:  70   Resp: 16 16   Temp: 36.6  C (97.8  F) 36.8  C (98.2  F)   SpO2: 97% 97%         Electronically Signed By: Nick Harrell MD, 2018, 3:46 PM

## 2018-11-21 ENCOUNTER — OFFICE VISIT (OUTPATIENT)
Dept: OPHTHALMOLOGY | Facility: CLINIC | Age: 38
End: 2018-11-21
Attending: OPHTHALMOLOGY
Payer: COMMERCIAL

## 2018-11-21 DIAGNOSIS — Z48.810 AFTERCARE FOLLOWING SURGERY OF A SENSORY ORGAN: Primary | ICD-10-CM

## 2018-11-21 PROCEDURE — G0463 HOSPITAL OUTPT CLINIC VISIT: HCPCS | Mod: ZF

## 2018-11-21 RX ORDER — ATROPINE SULFATE 10 MG/ML
1 SOLUTION/ DROPS OPHTHALMIC
COMMUNITY
End: 2019-01-29

## 2018-11-21 ASSESSMENT — VISUAL ACUITY
OS_CC+: -2
OS_CC: 20/20
CORRECTION_TYPE: GLASSES
METHOD: SNELLEN - LINEAR
OD_SC: CF @ 4FT
OD_PH_SC: 20/500

## 2018-11-21 ASSESSMENT — EXTERNAL EXAM - LEFT EYE: OS_EXAM: NORMAL

## 2018-11-21 ASSESSMENT — TONOMETRY
OS_IOP_MMHG: 18
OD_IOP_MMHG: 16
IOP_METHOD: TONOPEN

## 2018-11-21 ASSESSMENT — EXTERNAL EXAM - RIGHT EYE: OD_EXAM: NORMAL

## 2018-11-21 ASSESSMENT — SLIT LAMP EXAM - LIDS
COMMENTS: NORMAL
COMMENTS: NORMAL

## 2018-11-21 ASSESSMENT — CUP TO DISC RATIO: OD_RATIO: 0.2

## 2018-11-21 NOTE — OP NOTE
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     DESCRIPTION OF THE PROCEDURE   The patient was brought into the OR where anesthesia was given by the anesthesia department. A peribulbar block consistent of a 1:1 mixtures of 2% Lidocaine and 0.75 % of marcaine with epinephrine and wydase was administered. The eye was prepped and draped in the usual fashion for ophthalmic surgery, including the installation of one drop of povidone iodine.    A 360 degree conjunctival peritomy was created and the quadrants cleared with the berry scissors. The muscles were isolated and looped with 2-0 silk sutures. A 4050 band previously soak in polytrim solution was placed under each of the rectus muscles. A #3084 sleeve was used to secure the band in the superonasal quadrant. Next 5-0 nylon horizontal mattress sutures were placed in each quadrants to secure the band. The eye was checked and found to be firm.    Marks were made on the sclera inferotemporally, superotemporally, and superonasally 3.5 mm posterior to the limbus. Transscleral cannulas were inserted through the sclera using the trocars. The infusion cannula was connected inferotemporally and directly visualized to verify it was in the correct location. The  vitrector handpiece and endoilluiminator were placed in the eye. Upon entering the eye it was noted that the patient had multiple anterior retinal breaks and focal detachments. Areas of proliferative vitreoretinopathy were noted over retina.    A pars plana vitrectomy was completed followed by insertion of kenalog and peripheral vitrectomy assisted with scleral depression. The retinal breaks were trimmed and vitreous traction released. An epiretinal membrane was noted on the nasal periphery and macula and was peeled using ILM forceps.  An anterior retinectomy was completed over the nasal half of the retina over the buckle to remove areas of proliferative vitreoretinopathy . PFO was inserted in the eye to flatten the retina and 360 endolaser applied to the edge of retinectomy and over the buckle. An air-fluid exchange was completed and residual PFO was removed using BSS rinse. An inferior peripheral iridotomy was created. Silicone oil 1000ct was placed in the eye.     The instruments were removed. The sclerotomies were closed using 6-0 plain sutures.  The conjunctiva was closed using 6-0 plain sutures. The buckle was irrigated using polytrim solution. The eye pressure was inspected and was appropriate. Subconjunctival injections of ancef and dexamethasone were administered.  The lid speculum was removed. The eye was cleaned with wet and dry gauze. A drop of atropine and maxitrol ointment was placed in the eye. An eye patch and chapman shield were taped over the eye.   The patient tolerated well the procedure and was discharge to the post-operative unit in stable conditions with no complications.  The surgery was assisted by Dr. Parish Jain, because no qualified resident was available on the day of the surgery. Due to the delicate and complex nature of this surgery, Dr. Parish Jain was required. Dr. Parish Jain assisted with the vitrectomy. I was present for the entire surgery.

## 2018-11-21 NOTE — NURSING NOTE
Chief Complaints and History of Present Illnesses   Patient presents with     Follow Up For     1st day post op Right Eye 25g Pars Plana Vitrectomy, Membrane Peel, retinectomy, Fluid/Air Exchange, Silicone Oil , Scleral Buckle, Endolaser.     HPI    Affected eye(s):  Right   Symptoms:     No floaters   No flashes   Redness   Tearing   No Dryness         Do you have eye pain now?:  No      Comments:  Pt here for a 1st day post op Right Eye 25g Pars Plana Vitrectomy, Membrane Peel, retinectomy, Fluid/Air Exchange, Silicone Oil , Scleral Buckle, Endolaser. Pt notes RE is feeling a lot better today. Pt did note some pain last night. After taking off the patch pt notes vision seems a little better today.    Nohemy Gibbs, [facility] 9:34 AM November 21, 2018

## 2018-11-21 NOTE — PATIENT INSTRUCTIONS
Predforte (pink)  q2hs WA  Maxitrol oint at bedtime  Atropine (red top) once a day   Face down for 2-3 nights  vigamox (tan) four times a day      Follow up in one week  No heavy lifting  Glasses or shield at all times.

## 2018-11-21 NOTE — PROGRESS NOTES
Postoperative day 1 status post scleral buckle 41/70; 25 g Pars plana vitrectomy (PPV); membrane peel, retinectomy;   vancomycin intravitreal injection  For peripheral Retinal detachment  with proliferative vitreoretinopathy and history of traumatic endopththalmitis    Slept well  Retina attached  Doing well    Plan:  Position: face down  No aviation  No heavy lifting   Crow shield at all times  Retina detachment and endophthalmitis precautions were discussed with the patient and was asked to return if any of the those occur    Medications to operative eye  Predforte (pink)  q2hs WA  Maxitrol oint at bedtime  Atropine (red top) once a day   Face down for 2-3 nights  vigamox (tan) four times a day      Follow up in one week  ~~~~~~~~~~~~~~~~~~~~~~~~~~~~~~~~~~   Complete documentation of historical and exam elements from today's encounter can be found in the full encounter summary report (not reduplicated in this progress note).  I personally obtained the chief complaint(s) and history of present illness.  I confirmed and edited as necessary the review of systems, past medical/surgical history, family history, social history, and examination findings as documented by others; and I examined the patient myself.  I personally reviewed the relevant tests, images, and reports as documented above.  I formulated and edited as necessary the assessment and plan and discussed the findings and management plan with the patient and family    Tamera Walsh MD  .  Retina Service   Department of Ophthalmology and Visual Neurosciences   [facility]  Phone: [phone]   Fax: [phone]

## 2018-11-21 NOTE — MR AVS SNAPSHOT
After Visit Summary   2018    Otoniel Murry    MRN: [id number]           Patient Information     Date Of Birth          1980        Visit Information        Provider Department      2018 9:15 AM Tamera Walsh MD [facility]        Today's Diagnoses     Aftercare following surgery of a sensory organ    -  1      Care Instructions    Predforte (pink)  q2hs WA  Maxitrol oint at bedtime  Atropine (red top) once a day   Face down for 2-3 nights  vigamox (tan) four times a day      Follow up in one week  No heavy lifting  Glasses or shield at all times.           Follow-ups after your visit        Follow-up notes from your care team     Return in about 1 week (around 2018).      Your next 10 appointments already scheduled     2018 10:15 AM CST   RETURN RETINA with Tamera Walsh MD   Eye Clinic ([facility])    [address] [facility] [address] [place] [id number]   [phone]              Who to contact     Please call your clinic at [phone] to:    Ask questions about your health    Make or cancel appointments    Discuss your medicines    Learn about your test results    Speak to your doctor            Additional Information About Your Visit        MyChart Information     [email or web address] is an electronic gateway that provides easy, online access to your medical records. With Gear6, you can request a clinic appointment, read your test results, renew a prescription or communicate with your care team.     To sign up for [employer] visit the website at [email or web address]   You will be asked to enter the access code listed below, as well as some personal information. Please follow the directions to create your username and password.     Your access code is: [email or web address]  Expires: 2019  5:07 PM     Your access code will  in 90 days. If you need help or a new code, please contact your [facility] [facility] or call [phone] for assistance.        Care EveryWhere ID     This is your Care EveryWhere ID. This could be used by other organizations to access your [place] medical records  [id number]         Blood Pressure from Last 3 Encounters:   11/20/18 132/72   11/05/18 125/75   11/02/18 (!) 165/92    Weight from Last 3 Encounters:   11/05/18 131.1 kg (289 lb)   11/02/18 131.1 kg (289 lb)              Today, you had the following     No orders found for display       Primary Care Provider Fax #    Physician No Ref-Primary [phone]       No address on file        Equal Access to Services     BJ RODRIGUEZ : Hadii ping cardonao Giorgi, waaxda luqadaha, qaybta kaalmada adealmasyada, leann stewart . So [facility] [phone].    ATENCIÓN: Si habla español, tiene a zee disposición servicios gratuitos de asistencia lingüística. Llame al [phone].    We comply with applicable federal civil rights laws and [place] laws. We do not discriminate on the basis of race, color, national origin, age, disability, sex, sexual orientation, or gender identity.            Thank you!     Thank you for choosing [facility] CLINIC  for your care. Our goal is always to provide you with excellent care. Hearing back from our patients is one way we can continue to improve our services. Please take a few minutes to complete the written survey that you may receive in the mail after your visit with us. Thank you!             Your Updated Medication List - Protect others around you: Learn how to safely use, store and throw away your medicines at www.disposemymeds.org.          This list is accurate as of 11/21/18 10:13 AM.  Always use your most recent med list.                   Brand Name Dispense Instructions for use Diagnosis    atropine 1 % ophthalmic solution      Place 1 drop into the right eye        moxifloxacin 0.5 % ophthalmic solution    VIGAMOX    1 Bottle    Place 1 drop into the  right eye 4 times daily    Acute endophthalmitis of right eye       * neomycin-polymyxin-dexamethasone 3.5-85607-2.1 Oint ophthalmic ointment    MAXITROL     Place 0.5 inches into the right eye At Bedtime        * neomycin-polymixin-dexamethasone 0.1 % ophthalmic suspension    MAXITROL    5 mL    Apply 1 drop to eye 4 times daily Instill into operative eye(s) per physician instructions.    S/P eye surgery       prednisoLONE acetate 1 % ophthalmic susp    PRED FORTE    1 Bottle    Place 1 drop into the right eye every 4 hours    S/P eye surgery       TYLENOL PO      Take 650 mg by mouth every 4 hours as needed for mild pain or fever        * Notice:  This list has 2 medication(s) that are the same as other medications prescribed for you. Read the directions carefully, and ask your doctor or other care provider to review them with you.

## 2018-11-25 ENCOUNTER — HOSPITAL ENCOUNTER (EMERGENCY)
Facility: CLINIC | Age: 38
Discharge: HOME | End: 2018-11-25
Attending: EMERGENCY MEDICINE | Admitting: EMERGENCY MEDICINE
Payer: COMMERCIAL

## 2018-11-25 VITALS
HEIGHT: 73 IN | BODY MASS INDEX: 38.43 KG/M2 | TEMPERATURE: 98 F | WEIGHT: 290 LBS | HEART RATE: 72 BPM | SYSTOLIC BLOOD PRESSURE: 144 MMHG | RESPIRATION RATE: 16 BRPM | DIASTOLIC BLOOD PRESSURE: 92 MMHG | OXYGEN SATURATION: 99 %

## 2018-11-25 DIAGNOSIS — Z98.890 HISTORY OF RUPTURED GLOBE REPAIR: ICD-10-CM

## 2018-11-25 DIAGNOSIS — H40.051 ELEVATED IOP, RIGHT: ICD-10-CM

## 2018-11-25 DIAGNOSIS — H57.11 EYE PAIN, RIGHT: ICD-10-CM

## 2018-11-25 PROCEDURE — 96376 TX/PRO/DX INJ SAME DRUG ADON: CPT | Performed by: EMERGENCY MEDICINE

## 2018-11-25 PROCEDURE — 96361 HYDRATE IV INFUSION ADD-ON: CPT | Performed by: EMERGENCY MEDICINE

## 2018-11-25 PROCEDURE — 96374 THER/PROPH/DIAG INJ IV PUSH: CPT | Performed by: EMERGENCY MEDICINE

## 2018-11-25 PROCEDURE — 99285 EMERGENCY DEPT VISIT HI MDM: CPT | Mod: Z6 | Performed by: EMERGENCY MEDICINE

## 2018-11-25 PROCEDURE — 99285 EMERGENCY DEPT VISIT HI MDM: CPT | Mod: 25 | Performed by: EMERGENCY MEDICINE

## 2018-11-25 PROCEDURE — 96375 TX/PRO/DX INJ NEW DRUG ADDON: CPT | Performed by: EMERGENCY MEDICINE

## 2018-11-25 PROCEDURE — 25000128 H RX IP 250 OP 636: Performed by: EMERGENCY MEDICINE

## 2018-11-25 RX ORDER — ACETAZOLAMIDE 250 MG/1
250 TABLET ORAL 2 TIMES DAILY
Qty: 10 TABLET | Refills: 0 | Status: SHIPPED | OUTPATIENT
Start: 2018-11-25 | End: 2018-12-24

## 2018-11-25 RX ORDER — ONDANSETRON 2 MG/ML
4 INJECTION INTRAMUSCULAR; INTRAVENOUS ONCE
Status: COMPLETED | OUTPATIENT
Start: 2018-11-25 | End: 2018-11-25

## 2018-11-25 RX ORDER — HYDROMORPHONE HYDROCHLORIDE 1 MG/ML
0.5 INJECTION, SOLUTION INTRAMUSCULAR; INTRAVENOUS; SUBCUTANEOUS ONCE
Status: COMPLETED | OUTPATIENT
Start: 2018-11-25 | End: 2018-11-25

## 2018-11-25 RX ORDER — DORZOLAMIDE HYDROCHLORIDE AND TIMOLOL MALEATE 20; 5 MG/ML; MG/ML
1 SOLUTION/ DROPS OPHTHALMIC 2 TIMES DAILY
Qty: 10 ML | Refills: 1 | Status: SHIPPED | OUTPATIENT
Start: 2018-11-25 | End: 2019-01-29

## 2018-11-25 RX ORDER — HYDROCODONE BITARTRATE AND ACETAMINOPHEN 5; 325 MG/1; MG/1
1 TABLET ORAL EVERY 6 HOURS PRN
Qty: 6 TABLET | Refills: 0 | Status: SHIPPED | OUTPATIENT
Start: 2018-11-25 | End: 2019-04-24

## 2018-11-25 RX ORDER — LATANOPROST 50 UG/ML
1 SOLUTION/ DROPS OPHTHALMIC AT BEDTIME
Qty: 2.5 ML | Refills: 1 | Status: SHIPPED | OUTPATIENT
Start: 2018-11-25 | End: 2019-01-21

## 2018-11-25 RX ORDER — BRIMONIDINE TARTRATE 2 MG/ML
1 SOLUTION/ DROPS OPHTHALMIC 3 TIMES DAILY
Qty: 1 BOTTLE | Refills: 0 | Status: SHIPPED | OUTPATIENT
Start: 2018-11-25 | End: 2018-12-24

## 2018-11-25 RX ADMIN — SODIUM CHLORIDE 1000 ML: 9 INJECTION, SOLUTION INTRAVENOUS at 13:37

## 2018-11-25 RX ADMIN — Medication 0.5 MG: at 15:07

## 2018-11-25 RX ADMIN — Medication 0.5 MG: at 13:37

## 2018-11-25 RX ADMIN — ONDANSETRON HYDROCHLORIDE 4 MG: 2 INJECTION INTRAMUSCULAR; INTRAVENOUS at 13:37

## 2018-11-25 ASSESSMENT — ENCOUNTER SYMPTOMS
HEADACHES: 1
EYE PAIN: 1

## 2018-11-25 NOTE — ED AVS SNAPSHOT
[facility], Emergency Department    [address] [address]    Phone:  [phone]                                       Otoniel Murry   MRN: [id number]    Department:  [facility], Emergency Department   Date of Visit:  11/25/2018           Patient Information     Date Of Birth          1980        Your diagnoses for this visit were:     Elevated IOP, right     History of ruptured globe repair        You were seen by Laura Martinez MD.        Discharge Instructions       Please make an appointment to follow up with Eye Clinic (phone: [phone]) in 1 days.  Use your eye medications as directed   Do not drive while taking Norco    Your next 10 appointments already scheduled     Nov 28, 2018 10:15 AM CST   RETURN RETINA with Tamera Walsh MD   Eye Clinic ([facility])    [address] [address] [phone]              24 Hour Appointment Hotline       To make an appointment at any [facility], call [phone] ([phone]). If you don't have a family doctor or clinic, we will help you find one. [facility] clinics are conveniently located to serve the needs of you and your family.             Review of your medicines      START taking        Dose / Directions Last dose taken    acetaZOLAMIDE 250 MG tablet   Commonly known as:  DIAMOX   Dose:  250 mg   Quantity:  10 tablet        Take 1 tablet (250 mg) by mouth 2 times daily   Refills:  0        brimonidine 0.2 % ophthalmic solution   Commonly known as:  ALPHAGAN   Dose:  1 drop   Quantity:  1 Bottle        Place 1 drop into the right eye 3 times daily   Refills:  0        dorzolamide-timolol 2-0.5 % ophthalmic solution   Commonly known as:  COSOPT   Dose:  1 drop   Quantity:  10 mL        Place 1 drop into the right eye 2 times daily   Refills:  1        HYDROcodone-acetaminophen 5-325 MG tablet   Commonly known as:  NORCO   Dose:  1  tablet   Quantity:  6 tablet        Take 1 tablet by mouth every 6 hours as needed for severe pain   Refills:  0        latanoprost 0.005 % ophthalmic solution   Commonly known as:  XALATAN   Dose:  1 drop   Quantity:  2.5 mL        Place 1 drop into the right eye At Bedtime   Refills:  1          Our records show that you are taking the medicines listed below. If these are incorrect, please call your family doctor or clinic.        Dose / Directions Last dose taken    atropine 1 % ophthalmic solution   Dose:  1 drop        Place 1 drop into the right eye   Refills:  0        IBUPROFEN PO   Dose:  400 mg        Take 400 mg by mouth   Refills:  0        moxifloxacin 0.5 % ophthalmic solution   Commonly known as:  VIGAMOX   Dose:  1 drop   Quantity:  1 Bottle        Place 1 drop into the right eye 4 times daily   Refills:  1        * neomycin-polymyxin-dexamethasone 3.5-52385-6.1 Oint ophthalmic ointment   Commonly known as:  MAXITROL   Dose:  0.5 inch        Place 0.5 inches into the right eye At Bedtime   Refills:  0        * neomycin-polymixin-dexamethasone 0.1 % ophthalmic suspension   Commonly known as:  MAXITROL   Dose:  1 drop   Quantity:  5 mL        Apply 1 drop to eye 4 times daily Instill into operative eye(s) per physician instructions.   Refills:  0        prednisoLONE acetate 1 % ophthalmic susp   Commonly known as:  PRED FORTE   Dose:  1 drop   Quantity:  1 Bottle        Place 1 drop into the right eye every 4 hours   Refills:  0        TYLENOL PO   Dose:  650 mg        Take 650 mg by mouth every 4 hours as needed for mild pain or fever   Refills:  0        * Notice:  This list has 2 medication(s) that are the same as other medications prescribed for you. Read the directions carefully, and ask your doctor or other care provider to review them with you.            Information about OPIOIDS     PRESCRIPTION OPIOIDS: WHAT YOU NEED TO KNOW   We gave you an opioid (narcotic) pain medicine. It is important to  manage your pain, but opioids are not always the best choice. You should first try all the other options your care team gave you. Take this medicine for as short a time (and as few doses) as possible.    Some activities can increase your pain, such as bandage changes or therapy sessions. It may help to take your pain medicine 30 to 60 minutes before these activities. Reduce your stress by getting enough sleep, working on hobbies you enjoy and practicing relaxation or meditation. Talk to your care team about ways to manage your pain beyond prescription opioids.    These medicines have risks:    DO NOT drive when on new or higher doses of pain medicine. These medicines can affect your alertness and reaction times, and you could be arrested for driving under the influence (DUI). If you need to use opioids long-term, talk to your care team about driving.    DO NOT operate heavy machinery    DO NOT do any other dangerous activities while taking these medicines.    DO NOT drink any alcohol while taking these medicines.     If the opioid prescribed includes acetaminophen, DO NOT take with any other medicines that contain acetaminophen. Read all labels carefully. Look for the word  acetaminophen  or  Tylenol.  Ask your pharmacist if you have questions or are unsure.    You can get addicted to pain medicines, especially if you have a history of addiction (chemical, alcohol or substance dependence). Talk to your care team about ways to reduce this risk.    All opioids tend to cause constipation. Drink plenty of water and eat foods that have a lot of fiber, such as fruits, vegetables, prune juice, apple juice and high-fiber cereal. Take a laxative (Miralax, milk of magnesia, Colace, Senna) if you don t move your bowels at least every other day. Other side effects include upset stomach, sleepiness, dizziness, throwing up, tolerance (needing more of the medicine to have the same effect), physical dependence and slowed  breathing.    Store your pills in a secure place, locked if possible. We will not replace any lost or stolen medicine. If you don t finish your medicine, please throw away (dispose) as directed by your pharmacist. The [place] Pollution Control Agency has more information about safe disposal: https://www.pca.state.[place]/living-green/managing-unwanted-medications        Prescriptions were sent or printed at these locations (5 Prescriptions)                   Other Prescriptions                Printed at Department/Unit printer (5 of 5)         acetaZOLAMIDE (DIAMOX) 250 MG tablet               brimonidine (ALPHAGAN) 0.2 % ophthalmic solution               dorzolamide-timolol (COSOPT) 2-0.5 % ophthalmic solution               HYDROcodone-acetaminophen (NORCO) 5-325 MG tablet               latanoprost (XALATAN) 0.005 % ophthalmic solution                Orders Needing Specimen Collection     None      Pending Results     No orders found from 11/23/2018 to 11/26/2018.            Pending Culture Results     No orders found from 11/23/2018 to 11/26/2018.            Pending Results Instructions     If you had any lab results that were not finalized at the time of your Discharge, you can call the ED Lab Result RN at [phone]. You will be contacted by this team for any positive Lab results or changes in treatment. The nurses are available 7 days a week from 10A to 6:30P.  You can leave a message 24 hours per day and they will return your call.        Thank you for choosing [facility]       Thank you for choosing [facility] for your care. Our goal is always to provide you with excellent care. Hearing back from our patients is one way we can continue to improve our services. Please take a few minutes to complete the written survey that you may receive in the mail after you visit with us. Thank you!        [employer] Information     [employer] lets you send messages to your doctor, view your test results, renew your prescriptions,  "schedule appointments and more. To sign up, go to [email or web address] . Click on \"Log in\" on the left side of the screen, which will take you to the Welcome page. Then click on \"Sign up Now\" on the right side of the page.     You will be asked to enter the access code listed below, as well as some personal information. Please follow the directions to create your username and password.     Your access code is: [email or web address]  Expires: 2019  5:07 PM     Your access code will  in 90 days. If you need help or a new code, please call your [facility] clinic or [phone].        Care EveryWhere ID     This is your Care EveryWhere ID. This could be used by other organizations to access your [facility] medical records  [id number]        Equal Access to Services     KHANG RODRIGUEZ : Isabel Rand, valentino engel, abby myers, leann stewart . So [facility] [phone].    ATENCIÓN: Si habla español, tiene a zee disposición servicios gratuitos de asistencia lingüística. Llame al [phone].    We comply with applicable federal civil rights laws and [place] laws. We do not discriminate on the basis of race, color, national origin, age, disability, sex, sexual orientation, or gender identity.            After Visit Summary       This is your record. Keep this with you and show to your community pharmacist(s) and doctor(s) at your next visit.                  "

## 2018-11-25 NOTE — ED PROVIDER NOTES
History     Chief Complaint   Patient presents with     Headache     Eye Pain     right     HPI  Otoniel Murry is a 38 year old male with a history of ruptured right globe secondary to a metallic foreign body status post removal (11/6/2018), and status post right vitrectomy (11/20/2018), who presents to the Emergency Department for evaluation of right eye pain in setting of recent surgery. Patient reports that following his recent surgery on Tuesday, five days ago on 11/20/2018, he has had right eye pain which acutely worsened yesterday at which time he also developed a right-sided ocular headache. Patient notes that he has been in face-down precautions since his surgery up until yesterday. He denies any fevers or vomiting, but notes that he has been n.p.o.    Social: here with wife    I have reviewed the Medications, Allergies, Past Medical and Surgical History, and Social History in the mobileo system.    PAST MEDICAL HISTORY: History reviewed. No pertinent past medical history.    PAST SURGICAL HISTORY:   Past Surgical History:   Procedure Laterality Date     ORBITOTOMY Right 11/02/2018     ORTHOPEDIC SURGERY      thumb     VITRECTOMY PARSPLANA Right 11/06/2018    25  gauge pars plana vitectomy, vitreous biopsy, posterior hyaloid peel, endolaser; intravitreal antibiotics right eye      VITRECTOMY PARSPLANA WITH 23 GAUGE SYSTEM Right 11/2/2018    Procedure: 23 GAUGE VITRECTOMY, REMOVAL OF INTRAOCULAR FOREIGN BODY RIGHT EYE. MEMBRANE PEEL, VITREOUS BIOPSY, INTRAVITREAL ANTIBIOTICS, ANTERIOR SEGMENT RECONSTRUCTION, CORNEAL WOUND CLOSURE;  Surgeon: Tamera Walsh MD;  Location:  [facility]     VITRECTOMY PARSPLANA WITH 25 GAUGE SYSTEM Right 11/5/2018    Procedure: Right VITRECTOMY PARSPLANA WITH 25 GAUGE SYSTEM, vitreal antibiotic injections, endolaser, air fluid exchange, vitreal cultures;  Surgeon: Tamera Walsh MD;  Location:  OR     VITRECTOMY PARSPLANA WITH 25 GAUGE SYSTEM Right 11/20/2018     "Procedure: Right Eye 25g Pars Plana Vitrectomy, Membrane Peel, retinectomy, Fluid/Air Exchange, Silicone Oil , Scleral Buckle, Endolaser;  Surgeon: Tamera Walsh MD;  Location:  OR       FAMILY HISTORY:   Family History   Problem Relation Age of Onset     Hypertension Mother      Diabetes Father      Cancer No family hx of      Glaucoma No family hx of      Macular Degeneration No family hx of      Retinal detachment No family hx of        SOCIAL HISTORY:   Social History   Substance Use Topics     Smoking status: Former Smoker     Smokeless tobacco: Never Used     Alcohol use Yes      Comment: occas     No current facility-administered medications for this encounter.      Current Outpatient Prescriptions   Medication     Acetaminophen (TYLENOL PO)     acetaZOLAMIDE (DIAMOX) 250 MG tablet     atropine 1 % ophthalmic solution     brimonidine (ALPHAGAN) 0.2 % ophthalmic solution     dorzolamide-timolol (COSOPT) 2-0.5 % ophthalmic solution     HYDROcodone-acetaminophen (NORCO) 5-325 MG tablet     IBUPROFEN PO     latanoprost (XALATAN) 0.005 % ophthalmic solution     moxifloxacin (VIGAMOX) 0.5 % ophthalmic solution     neomycin-polymixin-dexamethasone (MAXITROL) 0.1 % ophthalmic suspension     prednisoLONE acetate (PRED FORTE) 1 % ophthalmic susp     neomycin-polymyxin-dexamethasone (MAXITROL) 3.5-67030-3.1 OINT ophthalmic ointment      No Known Allergies    Review of Systems   Eyes: Positive for pain (right) and visual disturbance (right sided vision loss).   Neurological: Positive for headaches (right-sided ocular).   All other systems reviewed and are negative.      Physical Exam   BP: (!) 144/92  Pulse: 72  Heart Rate: 74  Temp: 98  F (36.7  C)  Resp: 17  Height: 185.4 cm (6' 1\")  Weight: 131.5 kg (290 lb)  SpO2: 100 %      Physical Exam   Constitutional: He is oriented to person, place, and time. He appears well-developed and well-nourished.   HENT:   Head: Normocephalic.   Cardiovascular: Normal rate " and regular rhythm.    Pulmonary/Chest: Effort normal and breath sounds normal.   Neurological: He is alert and oriented to person, place, and time.   Psychiatric: He has a normal mood and affect.   Nursing note and vitals reviewed.  eyes: right eye has an irregular pupil in his sclerae injected, tearing, ecchymoses in the periorbital region    ED Course     ED Course     Procedures             Critical Care time:  none             Labs Ordered and Resulted from Time of ED Arrival Up to the Time of Departure from the ED - No data to display         Assessments & Plan (with Medical Decision Making)       I have reviewed the nursing notes.  Emergency Department course:  The patient was seen and examined at 1324 pm.   The patient is having quite a bit of eye pain and tearing from his right eye, status post ruptured globe repair.  I treated him with normal saline IV, Zofran IV, and Dilaudid IV for repeat doses .  Fortunately Dr. Jordan of ophthalmology was in the ED when the patient arrived, so he was able to consult on the patient immediately.  He measured an elevated intraocular pressure of 41.  The patient was treated with Diamox 250 instant release, Alphagan and Cosopt in the ED.  Repeat eye pressure prior to discharge was 27.    Dr. Jordan requested that the patient be discharged on Diamox, Alphagan, Cosopt, Xalatan and Norco.  He is to follow-up in the eye clinic tomorrow, Monday, September 26.  He was instructed not to drive while taking Norco.    The patient is a 38-year-old male with a history of a ruptured globe status post surgery here with right eye pain that is likely due to elevated intraocular pressure.  He was treated in the ED and his intraocular pressure has improved.  He will be discharged with the medications noted above and follow-up in the eye clinic tomorrow      I have reviewed the findings, diagnosis, plan and need for follow up with the patient.    Discharge Medication List as of 11/25/2018   3:47 PM      START taking these medications    Details   acetaZOLAMIDE (DIAMOX) 250 MG tablet Take 1 tablet (250 mg) by mouth 2 times daily, Disp-10 tablet, R-0, Local Print      brimonidine (ALPHAGAN) 0.2 % ophthalmic solution Place 1 drop into the right eye 3 times daily, Disp-1 Bottle, R-0, Local Print      dorzolamide-timolol (COSOPT) 2-0.5 % ophthalmic solution Place 1 drop into the right eye 2 times daily, Disp-10 mL, R-1, Local Print      HYDROcodone-acetaminophen (NORCO) 5-325 MG tablet Take 1 tablet by mouth every 6 hours as needed for severe pain, Disp-6 tablet, R-0, Local Print      latanoprost (XALATAN) 0.005 % ophthalmic solution Place 1 drop into the right eye At Bedtime, Disp-2.5 mL, R-1, Local Print             Final diagnoses:   Elevated IOP, right   History of ruptured globe repair   Eye pain, right     I, Natasha De La Torre, am serving as a trained medical scribe to document services personally performed by Laura Martinez MD, based on the provider's statements to me.   I, Laura Martinez MD, was physically present and have reviewed and verified the accuracy of this note documented by Natasha De La Torre.  This note was created in part by the use of Dragon voice recognition dictation system. Inadvertent grammatical errors and typographical errors may still exist.  Laura Martinez MD      11/25/2018   [facility], [place], EMERGENCY DEPARTMENT     Laura Martinez MD  11/25/18 [id number]

## 2018-11-25 NOTE — DISCHARGE INSTRUCTIONS
Please make an appointment to follow up with Eye Clinic (phone: [phone]) in 1 days.  Use your eye medications as directed   Do not drive while taking Norco

## 2018-11-25 NOTE — ED TRIAGE NOTES
Presents with headache and right eye pain that has gotten progressively worse since yesterday. Patient post-op day 5 from eye procedure.

## 2018-11-25 NOTE — CONSULTS
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    EXAMINATION:     Visual Acuity: Right Eye 20/400  Pupils: Right eye dilated (no afferent pupillary defect so far)    Intraocular Pressure: RE  43  Motility: RE Full; LE Full    External/Slit Lamp Exam   RIGHT EYE   Lids/Lashes: Brusing along lower lid   Conj/Sclera: Mild injection   Cornea:  cornea-lac with suture intact; para inf. within the suture    Ant Chamber:  Deep, small amount of ? SO ant to the iris   Iris: Dilated   Lens: Aphakic  LEFT   Lids/lashes: No Abnormality   Conj/Sclera: White and Quiet   Cornea:  Clear   Ant Chamber:  Deep and Quiet   Iris: Round and Reactive   Lens:Clear    Dilated Fundus Exam  Eyes Dilated? No  RIGHT:   Anterior Vitreous: Clear   Media: Clear   Optic Nerve: Normal Contours and Size   Cup to Disc Ratio: 0.3   Macula:   Vessels: Of course   Retinal Periphery: Retina attached, SB in good position and height  LEFT: DEFERRED  Two rounds of cosopt, Alphagan, and one round of 500 mg (250 x 2) Diamox given at the ED  intraocular pressure lowering down to 28 from 43    Plan:  Position: face down  No aviation  No heavy lifting   Crow shield at all times  Retina detachment and endophthalmitis precautions were discussed with the patient and was asked to return if any of the those occur    Start Cosopt, Alphagan, Xalatan and 250 mg DIAMXO  Start Acetalozomide 250 bid   Medications to operative eye   Predforte (pink)  q2hs WA   Maxitrol oint at bedtime   Atropine (red top) once a day     Vigamox (tan) four times a day      Follow up in retina clinic tomorrow    Discussed with Dr. Jain.    Hans Jordan MD   PGY-2 Ophthalmology  [phone]

## 2018-11-25 NOTE — ED AVS SNAPSHOT
[facility], [place], Emergency Department    [address] [place] [id number]    Phone:  [phone]                                       Otoniel Murry   MRN: [id number]    Department:  [facility], Emergency Department   Date of Visit:  11/25/2018           After Visit Summary Signature Page     I have received my discharge instructions, and my questions have been answered. I have discussed any challenges I see with this plan with the nurse or doctor.    ..........................................................................................................................................  Patient/Patient Representative Signature      ..........................................................................................................................................  Patient Representative Print Name and Relationship to Patient    ..................................................               ................................................  Date                                   Time    ..........................................................................................................................................  Reviewed by Signature/Title    ...................................................              ..............................................  Date                                               Time          22EPIC Rev 08/18

## 2018-11-26 ENCOUNTER — OFFICE VISIT (OUTPATIENT)
Dept: OPHTHALMOLOGY | Facility: CLINIC | Age: 38
End: 2018-11-26
Attending: OPHTHALMOLOGY
Payer: COMMERCIAL

## 2018-11-26 DIAGNOSIS — Z98.890 POST-OPERATIVE STATE: Primary | ICD-10-CM

## 2018-11-26 PROCEDURE — G0463 HOSPITAL OUTPT CLINIC VISIT: HCPCS | Mod: ZF

## 2018-11-26 ASSESSMENT — VISUAL ACUITY
METHOD: SNELLEN - LINEAR
OS_CC: 20/20

## 2018-11-26 ASSESSMENT — TONOMETRY
OS_IOP_MMHG: 8
IOP_METHOD: TONOPEN
OD_IOP_MMHG: 5

## 2018-11-26 ASSESSMENT — CONF VISUAL FIELD
OD_SUPERIOR_TEMPORAL_RESTRICTION: 1
OD_SUPERIOR_NASAL_RESTRICTION: 1
OS_NORMAL: 1
METHOD: COUNTING FINGERS

## 2018-11-26 ASSESSMENT — REFRACTION_WEARINGRX
OS_CYLINDER: SPHERE
OD_CYLINDER: SPHERE
OD_SPHERE: -3.00
OS_SPHERE: -3.25

## 2018-11-26 ASSESSMENT — SLIT LAMP EXAM - LIDS
COMMENTS: NORMAL
COMMENTS: NORMAL

## 2018-11-26 ASSESSMENT — CUP TO DISC RATIO: OD_RATIO: 0.2

## 2018-11-26 ASSESSMENT — EXTERNAL EXAM - RIGHT EYE: OD_EXAM: NORMAL

## 2018-11-26 ASSESSMENT — EXTERNAL EXAM - LEFT EYE: OS_EXAM: NORMAL

## 2018-11-26 NOTE — PROGRESS NOTES
CC - Post Op OD    INTERVAL HISTORY - No pain today, was seen in ED yesterday 11/25/18 with high IOP in 40s and severe pain.      HPI -   Otoniel Murry is a  38 year old year-old patient with history of large metallic IOFB OD after [place] hammer [place], s/p removal 11/2018    GTTS/MEDS  Diamox 250mg BID  Cosopt OD BID  Alphagan OD TID  Xalatan OD at bedtime  PF OD q2  Maxitrol lin OD at bedtime  AT OD daily  vigamox OD 4/day        PAST OCULAR SURGERY  PPV/PPL/IOFB removal/OGR/ABx OD 11/2/18  ()  PPV/Vit Bx/ABx OD 11/4/18 ()  PPV/SBP/MS/retinectomy/SO 1000/IVT vanco OD 11/20/18 ()      RETINAL IMAGING:   OCT 11-19-18  OD - mild CME, mild outer retinal irregularities    ASSESSMENT & PLAN    0.  POW#1 OD   - s/p PPV/SBP/MS/retinectomy/SO 1000cs OD 11/20/18   - had IOP spike 11/25/18, now controlled with meds   - IOP very good today   - stop diamox   - continue cosopt, alphagan, xalatan   - reduce PF to q3 hours   - continue AT daily   - stop vigamox tomorrow   - see Nico on Wednesday to verify IOP OK          1.  Endophthalmitis OD   - after IOFB on farm injury   - B. cereus on culture   - s/p PPV/ABx x 3 (last 11/20/18)   - finished oral cipro      - likely resolved      2. H/o RD OD   - see above        return to clinic: post-op with  2-3 days      ATTESTATION     Attending Attestation:     Complete documentation of historical and exam elements from today's encounter can be found in the full encounter summary report (not reduplicated in this progress note).  I personally obtained the chief complaint(s) and history of present illness.  I confirmed and edited as necessary the review of systems, past medical/surgical history, family history, social history, and examination findings as documented by others; and I examined the patient myself.  I personally reviewed the relevant tests, images, and reports as documented above.  I formulated and edited as necessary the assessment and plan  and discussed the findings and management plan with the patient and family    Malina Kohli MD, PhD  , Vitreoretinal Surgery  Department of Ophthalmology  [facility]

## 2018-11-26 NOTE — MR AVS SNAPSHOT
After Visit Summary   2018    Otoniel Murry    MRN: [id number]           Patient Information     Date Of Birth          1980        Visit Information        Provider Department      2018 8:00 AM Malina Kohli MD [facility]        Today's Diagnoses     Post-operative state    -  1      Care Instructions    Stop the diamox (oral pills)  Continue dorzolamide-timolol 2/day (Cosopt)  Continue brimonidine 3/day  Continue latanoprost (xalatan)  Reduce the prednisolone acetate to every 3 hours  Stop the moxifloxacin tomorrow  Continue the ointment at night  Continue the atropine    Sleep on your side  Do not sleep flat on your back          Follow-ups after your visit        Follow-up notes from your care team     Return in about 2 days (around 2018).      Your next 10 appointments already scheduled     2018 10:15 AM CST   RETURN RETINA with Tamera Walsh MD   Eye Clinic ([facility])    [address] [address]   [phone]              Who to contact     Please call your clinic at [phone] to:    Ask questions about your health    Make or cancel appointments    Discuss your medicines    Learn about your test results    Speak to your doctor            Additional Information About Your Visit        MyChart Information     [employer] is an electronic gateway that provides easy, online access to your medical records. With [employer], you can request a clinic appointment, read your test results, renew a prescription or communicate with your care team.     To sign up for [employer] visit the website at [email or web address]   You will be asked to enter the access code listed below, as well as some personal information. Please follow the directions to create your username and password.     Your access code is: [email or web address]  Expires: 2019  5:07 PM     Your access code will  in  90 days. If you need help or a new code, please contact your [facility] or call [phone] for assistance.        Care EveryWhere ID     This is your Care EveryWhere ID. This could be used by other organizations to access your [facility] medical records  [id number]         Blood Pressure from Last 3 Encounters:   11/25/18 (!) 144/92   11/20/18 132/72   11/05/18 125/75    Weight from Last 3 Encounters:   11/25/18 131.5 kg (290 lb)   11/05/18 131.1 kg (289 lb)   11/02/18 131.1 kg (289 lb)              Today, you had the following     No orders found for display         Today's Medication Changes          These changes are accurate as of 11/26/18 10:00 AM.  If you have any questions, ask your nurse or doctor.               These medicines have changed or have updated prescriptions.        Dose/Directions    prednisoLONE acetate 1 % ophthalmic susp   Commonly known as:  PRED FORTE   This may have changed:  when to take this   Used for:  S/P eye surgery        Dose:  1 drop   Place 1 drop into the right eye every 4 hours   Quantity:  1 Bottle   Refills:  0                Primary Care Provider Fax #    Physician No Ref-Primary [phone]       No address on file        Equal Access to Services     KHANG RODRIGUEZ AH: Isabel Rand, valentino engel, abby myers, leann owens. So [facility] [phone].    ATENCIÓN: Si habla español, tiene a zee disposición servicios gratuitos de asistencia lingüística. Llame al [phone].    We comply with applicable federal civil rights laws and [place] laws. We do not discriminate on the basis of race, color, national origin, age, disability, sex, sexual orientation, or gender identity.            Thank you!     Thank you for choosing [facility]  for your care. Our goal is always to provide you with excellent care. Hearing back from our patients is one way we can continue to improve our services.  Please take a few minutes to complete the written survey that you may receive in the mail after your visit with us. Thank you!             Your Updated Medication List - Protect others around you: Learn how to safely use, store and throw away your medicines at www.disposemymeds.org.          This list is accurate as of 11/26/18 10:00 AM.  Always use your most recent med list.                   Brand Name Dispense Instructions for use Diagnosis    acetaZOLAMIDE 250 MG tablet    DIAMOX    10 tablet    Take 1 tablet (250 mg) by mouth 2 times daily        atropine 1 % ophthalmic solution      Place 1 drop into the right eye        brimonidine 0.2 % ophthalmic solution    ALPHAGAN    1 Bottle    Place 1 drop into the right eye 3 times daily        dorzolamide-timolol 2-0.5 % ophthalmic solution    COSOPT    10 mL    Place 1 drop into the right eye 2 times daily        HYDROcodone-acetaminophen 5-325 MG tablet    NORCO    6 tablet    Take 1 tablet by mouth every 6 hours as needed for severe pain        IBUPROFEN PO      Take 400 mg by mouth        latanoprost 0.005 % ophthalmic solution    XALATAN    2.5 mL    Place 1 drop into the right eye At Bedtime        moxifloxacin 0.5 % ophthalmic solution    VIGAMOX    1 Bottle    Place 1 drop into the right eye 4 times daily    Acute endophthalmitis of right eye       * neomycin-polymyxin-dexamethasone 3.5-18589-1.1 Oint ophthalmic ointment    MAXITROL     Place 0.5 inches into the right eye At Bedtime        * neomycin-polymixin-dexamethasone 0.1 % ophthalmic suspension    MAXITROL    5 mL    Apply 1 drop to eye 4 times daily Instill into operative eye(s) per physician instructions.    S/P eye surgery       prednisoLONE acetate 1 % ophthalmic susp    PRED FORTE    1 Bottle    Place 1 drop into the right eye every 4 hours    S/P eye surgery       TYLENOL PO      Take 650 mg by mouth every 4 hours as needed for mild pain or fever        * Notice:  This list has 2 medication(s)  that are the same as other medications prescribed for you. Read the directions carefully, and ask your doctor or other care provider to review them with you.

## 2018-11-26 NOTE — PATIENT INSTRUCTIONS
Stop the diamox (oral pills)  Continue dorzolamide-timolol 2/day (Cosopt)  Continue brimonidine 3/day  Continue latanoprost (xalatan)  Reduce the prednisolone acetate to every 3 hours  Stop the moxifloxacin tomorrow  Continue the ointment at night  Continue the atropine    Sleep on your side  Do not sleep flat on your back

## 2018-11-26 NOTE — NURSING NOTE
Chief Complaints and History of Present Illnesses   Patient presents with     Follow Up For     Recent retinal detachment of right eye, total/subtotal      HPI    Affected eye(s):  Right   Symptoms:        Duration:  1 month   Frequency:  Constant       Do you have eye pain now?:  No      Comments:  Otoniel is here for a follow up of Recent retinal detachment of right eye, total/subtotal   He was seen in ED yesterday as he had a bad headache. His IOP RE was 41. After diamox and the standard eye drops his IOP RE dropped to 27.   He is here for a re-check today. 11-21 he had a scleral buckle RE.     Saeed Busby COT 8:22 AM November 26, 2018

## 2018-11-28 ENCOUNTER — OFFICE VISIT (OUTPATIENT)
Dept: OPHTHALMOLOGY | Facility: CLINIC | Age: 38
End: 2018-11-28
Attending: OPHTHALMOLOGY
Payer: COMMERCIAL

## 2018-11-28 DIAGNOSIS — Z98.890 POST-OPERATIVE STATE: Primary | ICD-10-CM

## 2018-11-28 DIAGNOSIS — Z48.810 AFTERCARE FOLLOWING SURGERY OF A SENSORY ORGAN: ICD-10-CM

## 2018-11-28 PROCEDURE — G0463 HOSPITAL OUTPT CLINIC VISIT: HCPCS | Mod: ZF

## 2018-11-28 ASSESSMENT — SLIT LAMP EXAM - LIDS
COMMENTS: NORMAL
COMMENTS: NORMAL

## 2018-11-28 ASSESSMENT — VISUAL ACUITY
OD_SC: 20/600
CORRECTION_TYPE: GLASSES
METHOD: SNELLEN - LINEAR
OS_CC: 20/20
OD_PH_SC: 20/250

## 2018-11-28 ASSESSMENT — EXTERNAL EXAM - RIGHT EYE: OD_EXAM: NORMAL

## 2018-11-28 ASSESSMENT — EXTERNAL EXAM - LEFT EYE: OS_EXAM: NORMAL

## 2018-11-28 ASSESSMENT — TONOMETRY
OD_IOP_MMHG: 08
IOP_METHOD: ICARE

## 2018-11-28 ASSESSMENT — CUP TO DISC RATIO: OD_RATIO: 0.2

## 2018-11-28 NOTE — PATIENT INSTRUCTIONS
Medications to operative eye  Reduce Pred Forte to four times a day for 1 week, then 3 times a day for 1 week, then 2 times a day for 1 week, then daily for one week, then stop  Continue Cosopt twice a day  Continue Xalatan every night    Discontinue Atropine  Disconinue maxitrol ointment when tube runs out  Discontinue Alphagan    Return 4-6 weeks

## 2018-11-28 NOTE — MR AVS SNAPSHOT
After Visit Summary   11/28/2018    Otoniel Murry    MRN: [id number]           Patient Information     Date Of Birth          1980        Visit Information        Provider Department      11/28/2018 10:15 AM Tamera Walsh MD [facility]        Today's Diagnoses     Post-operative state - Right Eye    -  1      Care Instructions    Medications to operative eye  Reduce Pred Forte to four times a day for 1 week, then 3 times a day for 1 week, then 2 times a day for 1 week, then daily for one week, then stop  Continue Cosopt twice a day  Continue Xalatan every night    Discontinue Atropine  Disconinue maxitrol ointment when tube runs out  Discontinue Alphagan    Return 4-6 weeks          Follow-ups after your visit        Follow-up notes from your care team     Return in about 4 weeks (around 12/26/2018) for Follow Up.      Your next 10 appointments already scheduled     Jan 09, 2019  9:30 AM CST   RETURN RETINA with Tamera Walsh MD   Eye Clinic ([facility])    [address] [facility] [address] [place] [id number] [phone]              Who to contact     Please call your clinic at [phone] to:    Ask questions about your health    Make or cancel appointments    Discuss your medicines    Learn about your test results    Speak to your doctor            Additional Information About Your Visit        MyChart Information     [employer] is an electronic gateway that provides easy, online access to your medical records. With [employer], you can request a clinic appointment, read your test results, renew a prescription or communicate with your care team.     To sign up for [email or web address] visit the website at [email or web address]   You will be asked to enter the access code listed below, as well as some personal information. Please follow the directions to create your username and password.     Your access code is: [email or web address]  Expires:  2019  5:07 PM     Your access code will  in 90 days. If you need help or a new code, please contact your [facility] or call [phone] for assistance.        Care EveryWhere ID     This is your Care EveryWhere ID. This could be used by other organizations to access your [facility] medical records  [id number]         Blood Pressure from Last 3 Encounters:   18 (!) 144/92   18 132/72   18 125/75    Weight from Last 3 Encounters:   18 131.5 kg (290 lb)   18 131.1 kg (289 lb)   18 131.1 kg (289 lb)              Today, you had the following     No orders found for display         Today's Medication Changes          These changes are accurate as of 18 12:32 PM.  If you have any questions, ask your nurse or doctor.               These medicines have changed or have updated prescriptions.        Dose/Directions    prednisoLONE acetate 1 % ophthalmic suspension   Commonly known as:  PRED FORTE   This may have changed:  when to take this   Used for:  S/P eye surgery        Dose:  1 drop   Place 1 drop into the right eye every 4 hours   Quantity:  1 Bottle   Refills:  0                Primary Care Provider Fax #    Physician No Ref-Primary [phone]       No address on file        Equal Access to Services     KHANG RODRIGUEZ AH: Isabel cardonao Sodesean, waaxda luqadaha, qaybta kaalmada adeegyada, leann owens. So [facility] [phone].    ATENCIÓN: Si habla español, tiene a zee disposición servicios gratuitos de asistencia lingüística. Llame al [phone].    We comply with applicable federal civil rights laws and [place] laws. We do not discriminate on the basis of race, color, national origin, age, disability, sex, sexual orientation, or gender identity.            Thank you!     Thank you for choosing EYE CLINIC  for your care. Our goal is always to provide you with excellent care. Hearing back from our  patients is one way we can continue to improve our services. Please take a few minutes to complete the written survey that you may receive in the mail after your visit with us. Thank you!             Your Updated Medication List - Protect others around you: Learn how to safely use, store and throw away your medicines at www.disposemymeds.org.          This list is accurate as of 11/28/18 12:32 PM.  Always use your most recent med list.                   Brand Name Dispense Instructions for use Diagnosis    acetaZOLAMIDE 250 MG tablet    DIAMOX    10 tablet    Take 1 tablet (250 mg) by mouth 2 times daily        atropine 1 % ophthalmic solution      Place 1 drop into the right eye        brimonidine 0.2 % ophthalmic solution    ALPHAGAN    1 Bottle    Place 1 drop into the right eye 3 times daily        dorzolamide-timolol 2-0.5 % ophthalmic solution    COSOPT    10 mL    Place 1 drop into the right eye 2 times daily        HYDROcodone-acetaminophen 5-325 MG tablet    NORCO    6 tablet    Take 1 tablet by mouth every 6 hours as needed for severe pain        IBUPROFEN PO      Take 400 mg by mouth        latanoprost 0.005 % ophthalmic solution    XALATAN    2.5 mL    Place 1 drop into the right eye At Bedtime        moxifloxacin 0.5 % ophthalmic solution    VIGAMOX    1 Bottle    Place 1 drop into the right eye 4 times daily    Acute endophthalmitis of right eye       * neomycin-polymyxin-dexamethasone 3.5-07396-3.1 ophthalmic ointment    MAXITROL     Place 0.5 inches into the right eye At Bedtime        * neomycin-polymixin-dexamethasone 0.1 % ophthalmic suspension    MAXITROL    5 mL    Apply 1 drop to eye 4 times daily Instill into operative eye(s) per physician instructions.    S/P eye surgery       prednisoLONE acetate 1 % ophthalmic suspension    PRED FORTE    1 Bottle    Place 1 drop into the right eye every 4 hours    S/P eye surgery       TYLENOL PO      Take 650 mg by mouth every 4 hours as needed for mild  pain or fever        * Notice:  This list has 2 medication(s) that are the same as other medications prescribed for you. Read the directions carefully, and ask your doctor or other care provider to review them with you.

## 2018-11-28 NOTE — NURSING NOTE
Chief Complaints and History of Present Illnesses   Patient presents with     Post Op (Ophthalmology) Right Eye      s/p PPV/SBP/MS/retinectomy/SO 1000cs OD 11/20/18     HPI    Affected eye(s):  Right   Symptoms:        Duration:  2 days   Frequency:  Constant       Do you have eye pain now?:  No      Comments:  No change in VA BE.  No pain BE.  Elizabeth Rodrigez COT 10:55 AM November 28, 2018

## 2018-11-30 LAB
FUNGUS SPEC CULT: NORMAL
Lab: NORMAL
SPECIMEN SOURCE: NORMAL

## 2018-12-11 ENCOUNTER — MYC MEDICAL ADVICE (OUTPATIENT)
Dept: OPHTHALMOLOGY | Facility: CLINIC | Age: 38
End: 2018-12-11

## 2018-12-11 NOTE — TELEPHONE ENCOUNTER
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 Most recdent PPV with SO placed in eye. Patient called today from [place] due to persistently elevated IOP (60 on 250 mg BID of diamox and Cosopt and Xalatan). Scheduled for Ahmed tube today in [place] and him and his wife called for our recommendations.    Explained to patient that it is difficult to provide recommendations without having seen patient this week. His IOP spike is likely mixed etiology with intermittent angle closure from SO (had complete fill) vs steroid response vs aphakia vs trauma induced angle trauma all contributing. A tube would be prone to have SO plugged by it and may further lower the IOP bringing SO to the AC (SO not present in AC at most recent exam 11/28). However, emphasized that impossible to provide recommendations on the surgery. Patient lives near [place]. Explained that it is reasonable to proceed with surgery (scheduled for 5pm today) and we will continue to manage him afterwards; also explained that if he did not proceed with surgery we would happy to see him Thursday in conjunction with one of our glaucoma specialists and before that time would recommend increasing Diamox from 250 mg BID to 500 mg TID and adding Alphagan to his Cosopt and Xalatan regimen.     Patient said would discuss with his wife and [place] physician and thanked us for answering his questions.    If patient calls back and elects for the second option please schedule with Tosin Walsh and Reshma on Thursday.    Tin Fortune M.D.  PGY-3, Ophthalmology

## 2018-12-12 NOTE — TELEPHONE ENCOUNTER
Spoke to pt    Scheduled evaluation tomorrow with dr. arellano   post6-op review eye pressure/eye drop regimen    Pt satisfied with appt date/time  Max Patel RN 10:50 AM 12/12/18    Note to facilitator for review     mychart message below  Javid did not have the Ahmed valve surgery in [place] today. Patrick was okay with this plan. When he spoke with your office you thought you could and should see him this Thursday. Can you call him with the time of the Thursday appt - and also to review the medicines he should be on between now and then to keep his pressures under control?

## 2018-12-13 ENCOUNTER — OFFICE VISIT (OUTPATIENT)
Dept: OPHTHALMOLOGY | Facility: CLINIC | Age: 38
End: 2018-12-13
Attending: OPHTHALMOLOGY
Payer: COMMERCIAL

## 2018-12-13 DIAGNOSIS — H27.01 APHAKIA OF RIGHT EYE: ICD-10-CM

## 2018-12-13 DIAGNOSIS — Z98.890 POST-OPERATIVE STATE: ICD-10-CM

## 2018-12-13 DIAGNOSIS — H40.89 GLAUCOMA DUE TO COMBINATION OF MECHANISMS: Primary | ICD-10-CM

## 2018-12-13 DIAGNOSIS — H33.051 RECENT RETINAL DETACHMENT OF RIGHT EYE, TOTAL/SUBTOTAL: Primary | ICD-10-CM

## 2018-12-13 DIAGNOSIS — H40.241 RESIDUAL STAGE OF ANGLE-CLOSURE GLAUCOMA OF RIGHT EYE: ICD-10-CM

## 2018-12-13 DIAGNOSIS — S05.31XD: ICD-10-CM

## 2018-12-13 PROCEDURE — 92250 FUNDUS PHOTOGRAPHY W/I&R: CPT | Mod: 59,ZF | Performed by: OPHTHALMOLOGY

## 2018-12-13 PROCEDURE — 92134 CPTRZ OPH DX IMG PST SGM RTA: CPT | Mod: ZF | Performed by: OPHTHALMOLOGY

## 2018-12-13 PROCEDURE — 92285 EXTERNAL OCULAR PHOTOGRAPHY: CPT | Mod: ZF | Performed by: OPHTHALMOLOGY

## 2018-12-13 PROCEDURE — G0463 HOSPITAL OUTPT CLINIC VISIT: HCPCS | Mod: ZF

## 2018-12-13 PROCEDURE — 92020 GONIOSCOPY: CPT | Mod: ZF | Performed by: OPHTHALMOLOGY

## 2018-12-13 PROCEDURE — 66761 REVISION OF IRIS: CPT | Mod: RT,ZF | Performed by: OPHTHALMOLOGY

## 2018-12-13 ASSESSMENT — REFRACTION_WEARINGRX
OD_CYLINDER: SPHERE
OD_SPHERE: -3.00
OS_CYLINDER: SPHERE
OS_SPHERE: -3.25

## 2018-12-13 ASSESSMENT — VISUAL ACUITY
OD_PH_SC: 20/150
OD_PH_SC: 20/150
OD_SC: 20/600
OS_CC: 20/20
OD_SC: 20/600
CORRECTION_TYPE: GLASSES
METHOD: SNELLEN - LINEAR
OS_CC: 20/20
METHOD: ALLEN CARDS

## 2018-12-13 ASSESSMENT — EXTERNAL EXAM - LEFT EYE
OS_EXAM: NORMAL
OS_EXAM: NORMAL

## 2018-12-13 ASSESSMENT — TONOMETRY
OD_IOP_MMHG: 09
OS_IOP_MMHG: 14
OD_IOP_MMHG: 10
IOP_METHOD: ICARE
IOP_METHOD: APPLANATION
OS_IOP_MMHG: 14

## 2018-12-13 ASSESSMENT — CONF VISUAL FIELD
OS_NORMAL: 1
OD_NORMAL: 1

## 2018-12-13 ASSESSMENT — CUP TO DISC RATIO
OD_RATIO: 0.2
OD_RATIO: 0.2

## 2018-12-13 ASSESSMENT — GONIOSCOPY
OD_TEMPORAL: 3
OD_NASAL: 3
OD_SUPERIOR: 0
OD_INFERIOR: 2-3

## 2018-12-13 ASSESSMENT — SLIT LAMP EXAM - LIDS
COMMENTS: NORMAL

## 2018-12-13 ASSESSMENT — EXTERNAL EXAM - RIGHT EYE
OD_EXAM: NORMAL
OD_EXAM: NORMAL

## 2018-12-13 NOTE — LETTER
2018       RE: Otoniel Murry  [address] [address] [address]     Dear Tavon,    Thank you for referring your patient, Otoniel Murry, to the EYE CLINIC at [facility]. Please see a copy of my visit note below.    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 Seen in retina without evidence of SO migration and good IOP  IOP issues this past week in [place] with IOPs in 60s and was considering emergent Ahmed tube surgery 18 but after discussion decided to hold off due to concerns for significant post-op complications; advised patient to start Diamox 500 mg TID, continue cosopt and xalatan, add alphagan    Presents today with much improved pain. IOP 9 today. Tolerating diamox well (taste changes but no nausea / vomiting)    PAST OCULAR SURGERY  PPV/PPL/IOFB removal/OGR/ABx OD 18  (SM)  PPV/Vit Bx/ABx OD 18 (SM)  PPV/SBP/MS/retinectomy/SO 1000/IVT vanco OD 18 (SM)    Imagin18   SL consistent with exam. periperal iridotomy patent (after today's laser periperal iridotomy)  optos fundus pic consistent with exam- retina attached     Optical Coherence Tomography: 18   Right eye: slightly irregularity of the retina surface; mild subfoveal irregularity of the IS/OS and Retinal pigment epithelium   Left eye: good foveal contour    Assessment:  Retina attached  Doing well  IOP excellent today  Inf K suture loose    Cornea suture removal:  1gtt proparacaine given  1gtt of betadine 5% administered  Cornea suture removed without complications  1gtt of betadine 5% administered    Plan:  Vigamox drops four times a day  For 3  days    Plan:  Retina detachment and endophthalmitis precautions were discussed with the patient and was asked to return if any of the those occur    Current Medications to operative eye  Pred Forte TID currently  Cosopt OD BID  Xalatan OD QHS  Alphagan OD TID  Diamox 500 mg TID    Plan for follow up today with glaucoma - Dr. Justice  To help with glaucoma management     Follow up in 4-6 weeks with retina     Tin Fortune M.D.  PGY-3, Ophthalmology    Again, thank you for allowing me to participate in the care of your patient.      Sincerely,    Tamera Walsh MD  Associate  Professor.  Retina Service   Department of Ophthalmology and Visual Neurosciences   [facility]  Phone: [phone]   Fax: [phone]

## 2018-12-13 NOTE — PROGRESS NOTES
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 Seen in retina without evidence of SO migration and good IOP  IOP issues this past week in [place] with IOPs in 60s and was considering emergent Ahmed tube surgery 18 but after discussion decided to hold off due to concerns for significant post-op complications; advised patient to start Diamox 500 mg TID, continue cosopt and xalatan, add alphagan    Presents today with much improved pain. IOP 9 today. Tolerating diamox well (taste changes but no nausea / vomiting)    PAST OCULAR SURGERY  PPV/PPL/IOFB removal/OGR/ABx OD 18  ()  PPV/Vit Bx/ABx OD 18 ()  PPV/SBP/MS/retinectomy/SO 1000/IVT vanco OD 18 ()    Imagin18   SL consistent with exam. periperal iridotomy patent (after today's laser periperal iridotomy)  optos fundus pic consistent with exam- retina attached     Optical Coherence Tomography: 18   Right eye: slightly irregularity of the retina surface; mild subfoveal irregularity of the IS/OS and Retinal pigment epithelium   Left eye: good foveal contour    Assessment:  Retina attached  Doing well  IOP excellent today  Inf K suture loose    Cornea suture removal:  1gtt proparacaine given  1gtt of betadine 5% administered  Cornea suture removed without complications  1gtt of betadine 5% administered    Plan:  Vigamox drops four times a day  For 3 days    Plan:  Retina detachment and endophthalmitis precautions were discussed with the patient and was asked to return if any of the those occur    Current Medications to operative eye  Pred Forte TID currently  Cosopt OD BID  Xalatan OD QHS  Alphagan OD TID  Diamox 500 mg  TID    Plan for follow up today with glaucoma - Dr. Justice  To help with glaucoma management   periperal iridotomy performed today     Follow up in 4-6 weeks with retina     Tin Fortune M.D.  PGY-3, Ophthalmology    ~~~~~~~~~~~~~~~~~~~~~~~~~~~~~~~~~~   Complete documentation of historical and exam elements from today's encounter can be found in the full encounter summary report (not reduplicated in this progress note).  I personally obtained the chief complaint(s) and history of present illness.  I confirmed and edited as necessary the review of systems, past medical/surgical history, family history, social history, and examination findings as documented by others; and I examined the patient myself.  I personally reviewed the relevant tests, images, and reports as documented above.  I formulated and edited as necessary the assessment and plan and discussed the findings and management plan with the patient and family    Tamera Walsh MD  .  Retina Service   Department of Ophthalmology and Visual Neurosciences   [facility]  Phone: [phone]   Fax: [phone]

## 2018-12-13 NOTE — NURSING NOTE
Chief Complaints and History of Present Illnesses   Patient presents with     Post Op (Ophthalmology) Right Eye

## 2018-12-13 NOTE — PROGRESS NOTES
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 Walsh   3)Aphakia OS -- [place] near [place], [place]  4)K Scar/laceration with sutures OD  5)H/O Blunt trauma OD     MD: rec mCP prior to tube -- Dr. Walsh is considering SiO removal in 3 months -- could coordinate PP tube at time of oil removal     Patient will continue on Cosopt (Timolol/Dorzolamide) which is a blue top drop 2x/day (12 hours apart) in the RIGHT EYE, Alphagan (Brimonidine) which is a purple top drop 3x/day (8 hours apart) in the RIGHT EYE, Latanoprost which is a teal top drop at bedtime in the RIGHT EYE and DIamox 500mg Sequels 2x/day (12 hours apart).  Patient will keep previously scheduled follow up visit with Dr. Walsh and return to the glaucoma clinic sooner for elevated eye pressures.    A long discussion of the risks, benefits, and alternatives including potential treatment and management options were had with patient and a decision was made to proceed with repeat inferior yag LPI (laser) in the right eye.    Preoperative Diagnosis:Angle Closure, right eye  Postoperative Diagnosis:Angle Closure, right eye  Procedure:Yag Laser Peripheral Iridectomy, right eye  Anesthesia:Topical  Shots:12  MJ/shot:3.8mJ  Complications:None.  Patient tolerated the procedure well.    The risks, benefits and alternatives were discussed with patient.  Consent was signed by patient.  Patient received preoperative Pilocarpine, Alphagan and drops documented above.    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 Seen in retina without evidence of SO migration and good IOP  IOP issues this past week in [place] with IOPs in 60s and was considering emergent Ahmed tube surgery 12/11/18 but after discussion patient decided to hold off due to concerns for significant post-op complications; advised patient to start Diamox 500 mg TID, continue cosopt and xalatan, add alphagan     Presents today with much improved pain. IOP 9 today. Tolerating diamox well (taste changes but no nausea / vomiting)    Current Meds:  Pred Forte TID OD  Cosopt BID OD  Xalatan QHS OD  Alphagan TID OD  Diamox 500 mg TID    Recommend enlarging inferior LPI versus mpCPC versus removing SO with [place] if Dr. Justice recommends    Tin Fortune M.D.  PGY-3, Ophthalmology    Attending Physician Attestation:  Complete documentation of historical and exam elements from today's encounter can be found in the full encounter summary report (not reduplicated in this progress note). I personally obtained the chief complaint(s) and history of present illness.  I confirmed and edited as necessary the review of systems, past medical/surgical history, family history, social history, and examination findings as documented by others; and I examined the patient myself. I personally reviewed the relevant tests, images, and reports as documented above. I formulated and edited as necessary the assessment and plan and discussed the findings and management plan with the patient and family.  - Agnes Justice MD

## 2018-12-20 ENCOUNTER — MYC MEDICAL ADVICE (OUTPATIENT)
Dept: OPHTHALMOLOGY | Facility: CLINIC | Age: 38
End: 2018-12-20

## 2018-12-21 ENCOUNTER — OFFICE VISIT (OUTPATIENT)
Dept: OPHTHALMOLOGY | Facility: CLINIC | Age: 38
End: 2018-12-21
Attending: OPTOMETRIST
Payer: COMMERCIAL

## 2018-12-21 ENCOUNTER — MYC MEDICAL ADVICE (OUTPATIENT)
Dept: OPHTHALMOLOGY | Facility: CLINIC | Age: 38
End: 2018-12-21

## 2018-12-21 DIAGNOSIS — S05.31XD: ICD-10-CM

## 2018-12-21 DIAGNOSIS — H27.01 APHAKIA OF RIGHT EYE: ICD-10-CM

## 2018-12-21 DIAGNOSIS — H40.89 GLAUCOMA DUE TO COMBINATION OF MECHANISMS: ICD-10-CM

## 2018-12-21 DIAGNOSIS — H10.31 ACUTE BACTERIAL CONJUNCTIVITIS OF RIGHT EYE: ICD-10-CM

## 2018-12-21 DIAGNOSIS — H40.241 RESIDUAL STAGE OF ANGLE-CLOSURE GLAUCOMA OF RIGHT EYE: Primary | ICD-10-CM

## 2018-12-21 PROCEDURE — G0463 HOSPITAL OUTPT CLINIC VISIT: HCPCS | Mod: ZF

## 2018-12-21 RX ORDER — MOXIFLOXACIN 5 MG/ML
1 SOLUTION/ DROPS OPHTHALMIC 3 TIMES DAILY
Status: DISCONTINUED | OUTPATIENT
Start: 2018-12-21 | End: 2018-12-21

## 2018-12-21 ASSESSMENT — VISUAL ACUITY
OS_CC: 20/20
OD_PH_SC: 20/250
METHOD: SNELLEN - LINEAR

## 2018-12-21 ASSESSMENT — SLIT LAMP EXAM - LIDS
COMMENTS: NORMAL
COMMENTS: NORMAL

## 2018-12-21 ASSESSMENT — TONOMETRY
OD_IOP_MMHG: 18
IOP_METHOD: APPLANATION
OS_IOP_MMHG: 16

## 2018-12-21 ASSESSMENT — EXTERNAL EXAM - LEFT EYE: OS_EXAM: NORMAL

## 2018-12-21 ASSESSMENT — CUP TO DISC RATIO: OD_RATIO: 0.2

## 2018-12-21 ASSESSMENT — EXTERNAL EXAM - RIGHT EYE: OD_EXAM: NORMAL

## 2018-12-21 NOTE — PROGRESS NOTES
Assessment & Plan       Otoniel Murry is a 38 year old male with the following diagnoses:   1. 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 Rupture of globe of right eye following blunt trauma, subsequent encounter - Right Eye    3. 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 Aphakia of right eye - Right Eye     IOPS normal  Cont TX POAG         Keep consult with Nico  Also get apointment with Glaucoma  Refresh Celluvisc as needed   Moxifloxin 3 x right eyes     Return for Follow Up  sooner if pt feels eye is worse.          Complete documentation of historical and exam elements from today's encounter can be found in the full encounter summary report (not reduplicated in this progress note). I personally obtained the chief complaint(s) and history of present illness.  I confirmed and edited as necessary the review of systems, past medical/surgical history, family history, social history, and examination findings as documented by others; and I examined the patient myself. I personally reviewed the relevant tests, images, and reports as documented above. I formulated and edited as necessary the assessment and plan and discussed the findings and management plan with the patient and family.  Dr. Mauricio William

## 2018-12-21 NOTE — PATIENT INSTRUCTIONS
Keep consult with Nico  Also get apointment with Glaucoma  Refresh Celluvisc as needed   Moxifloxin 3 x right eyes

## 2018-12-21 NOTE — NURSING NOTE
Chief Complaints and History of Present Illnesses   Patient presents with     Dry Eye(s)     Chief Complaint(s) and History of Present Illness(es)     Dry Eye(s)     Laterality: right eye    Frequency: constantly    Duration: 3 days    Response to treatment: no improvement              Comments     Sharp/achy feeling with the RE  Started about 3 days ago.  The gtts helped for a bit  Better in the am  +discharge light green  More in the am  Jackelyn Mantilla COT 1:16 PM December 21, 2018

## 2018-12-24 DIAGNOSIS — H40.053 INCREASED INTRAOCULAR PRESSURE, BILATERAL: Primary | ICD-10-CM

## 2018-12-24 RX ORDER — BRIMONIDINE TARTRATE 2 MG/ML
1 SOLUTION/ DROPS OPHTHALMIC 3 TIMES DAILY
Qty: 1 BOTTLE | Refills: 0 | Status: SHIPPED | OUTPATIENT
Start: 2018-12-24 | End: 2019-01-21

## 2018-12-24 RX ORDER — ACETAZOLAMIDE 250 MG/1
250 TABLET ORAL 2 TIMES DAILY
Qty: 10 TABLET | Refills: 0 | Status: SHIPPED | OUTPATIENT
Start: 2018-12-24 | End: 2018-12-26

## 2018-12-24 NOTE — TELEPHONE ENCOUNTER
[facility] Call Center    Phone Message    May a detailed message be left on voicemail: yes    Reason for Call: Medication Refill Request    Has the patient contacted the pharmacy for the refill? Yes   Name of medication being requested: brimonidine (ALPHAGAN) 0.2 % ophthalmic solution AND acetaZOLAMIDE (DIAMOX) 250 MG tablet  Provider who prescribed the medication: N/A  Pharmacy: [facility] [address] [place] [address]  Date medication is needed: today - asap- pt is leaving [facility]         Action Taken: Message routed to:  [facility] eye clinic

## 2018-12-26 ENCOUNTER — TELEPHONE (OUTPATIENT)
Dept: OPHTHALMOLOGY | Facility: CLINIC | Age: 38
End: 2018-12-26

## 2018-12-26 DIAGNOSIS — H40.053 INCREASED INTRAOCULAR PRESSURE, BILATERAL: ICD-10-CM

## 2018-12-26 RX ORDER — ACETAZOLAMIDE 250 MG/1
500 TABLET ORAL 2 TIMES DAILY
Qty: 60 TABLET | Refills: 3 | Status: SHIPPED | OUTPATIENT
Start: 2018-12-26 | End: 2019-02-09

## 2018-12-26 NOTE — TELEPHONE ENCOUNTER
Note to on call provider to assist in refill request    Rx for diamox sent 12-24-18-- 250 mg by dr. champion    Pharmacy states will be tomorrow and has been using 500mg tabs 2/day per 12-13-18    No indication for change per my review    Note to dr. jiménez-- [facility] pharmacy    Max Patel RN 5:48 PM 12/26/18

## 2018-12-27 ENCOUNTER — TELEPHONE (OUTPATIENT)
Dept: OPHTHALMOLOGY | Facility: CLINIC | Age: 38
End: 2018-12-27

## 2018-12-27 NOTE — TELEPHONE ENCOUNTER
I spoke to the pharmacist, Maida, who notes that the patient is taking the Diamox 500 ER three times daily as requested by Dr. Justice (until after the holidays).  The prescription states Diamox 500mg ER twice daily.    I spoke with the patient to clarify what he is doing.  He is taking three Diamox 500mg daily.  He notes his eyes are doing well.    I spoke to the oncall Resident MD who notes that we will stay with the dosage Dr. Justice documented.  I spoke with the Pharmacist and the patient a second to time the directions.

## 2019-01-10 ENCOUNTER — OFFICE VISIT (OUTPATIENT)
Dept: OPHTHALMOLOGY | Facility: CLINIC | Age: 39
End: 2019-01-10
Attending: OPHTHALMOLOGY
Payer: COMMERCIAL

## 2019-01-10 DIAGNOSIS — S05.31XD: Primary | ICD-10-CM

## 2019-01-10 DIAGNOSIS — H40.89 GLAUCOMA DUE TO COMBINATION OF MECHANISMS: Primary | ICD-10-CM

## 2019-01-10 DIAGNOSIS — H18.30 CORNEAL EPITHELIAL DEFECT: Primary | ICD-10-CM

## 2019-01-10 DIAGNOSIS — H27.01 APHAKIA OF RIGHT EYE: ICD-10-CM

## 2019-01-10 PROCEDURE — 40000269 ZZH STATISTIC NO CHARGE FACILITY FEE: Mod: ZF

## 2019-01-10 PROCEDURE — G0463 HOSPITAL OUTPT CLINIC VISIT: HCPCS | Mod: ZF

## 2019-01-10 ASSESSMENT — VISUAL ACUITY
OD_SC+: ECC
OS_SC: 20/20
METHOD: SNELLEN - LINEAR
OD_SC: 20/400

## 2019-01-10 ASSESSMENT — EXTERNAL EXAM - LEFT EYE
OS_EXAM: NORMAL

## 2019-01-10 ASSESSMENT — EXTERNAL EXAM - RIGHT EYE
OD_EXAM: NORMAL

## 2019-01-10 ASSESSMENT — SLIT LAMP EXAM - LIDS
COMMENTS: NORMAL

## 2019-01-10 ASSESSMENT — CUP TO DISC RATIO: OD_RATIO: 0.2

## 2019-01-10 ASSESSMENT — TONOMETRY
OD_IOP_MMHG: 08
IOP_METHOD: APPLANATION
OS_IOP_MMHG: 15

## 2019-01-10 NOTE — PATIENT INSTRUCTIONS
Patient will continue on Cosopt (Timolol/Dorzolamide) which is a blue top drop 2x/day (12 hours apart) in the RIGHT EYE, Alphagan (Brimonidine) which is a purple top drop 2x/day (12 hours apart) in the RIGHT EYE, Latanoprost which is a teal top drop at bedtime in the RIGHT EYE.  Patient will discontinue and hold onto the DIamox 500mg Sequels.  Patient will continue on Prednisolone and keep previously scheduled follow up visit with Dr. Walsh and return to the glaucoma clinic sooner for elevated eye pressures.  Patient will get a repeat IOP check 5-7 days after stopping the Diamox.

## 2019-01-10 NOTE — PROGRESS NOTES
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 Walsh   3)Aphakia OS -- lives near [place], [place]  4)K Scar/laceration with sutures OD  5)H/O Blunt trauma OD     MD: rec mCPC prior to tube -- Dr. Walsh is considering SiO removal in 3 months -- could try to coordinate PP tube at time of oil removal pending future IOPs    Patient will continue on Cosopt (Timolol/Dorzolamide) which is a blue top drop 2x/day (12 hours apart) in the RIGHT EYE, Alphagan (Brimonidine) which is a purple top drop 2x/day (12 hours apart) in the RIGHT EYE, Latanoprost which is a teal top drop at bedtime in the RIGHT EYE.  Patient will discontinue and hold onto the DIamox 500mg Sequels.  Patient will continue on Prednisolone and keep previously scheduled follow up visit with Dr. Walsh and return to the glaucoma clinic sooner for elevated eye pressures.  Patient will get a repeat IOP check 5-7 days after stopping the Diamox.        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 Seen in retina without evidence of SO migration and good IOP  IOP issues this past week in [place] with IOPs in 60s  and was considering emergent Ahmed tube surgery 12/11/18 but after discussion patient decided to hold off due to concerns for significant post-op complications; advised patient to start Diamox 500 mg TID, continue cosopt and xalatan, add alphagan     Presents today with much improved pain. IOP 9 today. Tolerating diamox well (taste changes but no nausea / vomiting)    Current Meds:  Pred Forte TID OD  Cosopt BID OD  Xalatan QHS OD  Alphagan TID OD  Diamox 500 mg TID    Recommend enlarging inferior LPI versus mpCPC versus removing SO with [place] if Dr. Justice recommends    Diana Hernandez MD  PGY-3 Ophthalmology      Attending Physician Attestation:  Complete documentation of historical and exam elements from today's encounter can be found in the full encounter summary report (not reduplicated in this progress note). I personally obtained the chief complaint(s) and history of present illness.  I confirmed and edited as necessary the review of systems, past medical/surgical history, family history, social history, and examination findings as documented by others; and I examined the patient myself. I personally reviewed the relevant tests, images, and reports as documented above. I formulated and edited as necessary the assessment and plan and discussed the findings and management plan with the patient and family.  - Agnes Justice MD

## 2019-01-10 NOTE — PROGRESS NOTES
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 Seen in retina without evidence of SO migration and good IOP  IOP issues this past week in [place] with IOPs in 60s and was considering emergent Ahmed tube surgery 18 but after discussion decided to hold off due to concerns for significant post-op complications; advised patient to start Diamox 500 mg TID, continue cosopt and xalatan, add alphagan. Had repeat LPI due to non-patent PI 2018.    Presents today with much improved pain. IOP 9 today. Tolerating diamox well (taste changes but no nausea / vomiting)    PAST OCULAR SURGERY  PPV/PPL/IOFB removal/OGR/ABx OD 18  ()  PPV/Vit Bx/ABx OD 18 ()  PPV/SBP/MS/retinectomy/SO 1000/IVT vanco OD 18 ()    Imagin18   SL consistent with exam. periperal iridotomy patent (after today's laser periperal iridotomy)  optos fundus pic consistent with exam- retina attached     Optical Coherence Tomography: 18   Right eye: slightly irregularity of the retina surface; mild subfoveal irregularity of the IS/OS and Retinal pigment epithelium   Left eye: good foveal contour    periperal iridotomy performed today     Assessment:  Retina attached  Doing well  IOP excellent today  Cornea abrasion - to see cornea today    Plan:  Retina detachment and endophthalmitis precautions were discussed with the patient and was asked to return if any of the those occur    Current Medications to operative eye  Pred Forte taper to twice a day   Cosopt OD BID  Xalatan OD QHS  Alphagan OD decreased to twice a day (per Reshma today)  Diamox 500 mg twice a day- stop  Check intraocular pressure in one week after  stopping Diamox    Follow up in 4-6 weeks with retina with optos fundus pic and Optical Coherence Tomography     Tin Fortune M.D.  PGY-3, Ophthalmology    ~~~~~~~~~~~~~~~~~~~~~~~~~~~~~~~~~~   Complete documentation of historical and exam elements from today's encounter can be found in the full encounter summary report (not reduplicated in this progress note).  I personally obtained the chief complaint(s) and history of present illness.  I confirmed and edited as necessary the review of systems, past medical/surgical history, family history, social history, and examination findings as documented by others; and I examined the patient myself.  I personally reviewed the relevant tests, images, and reports as documented above.  I personally reviewed the ophthalmic test(s) associated with this encounter, agree with the interpretation(s) as documented by the resident/fellow, and have edited the corresponding report(s) as necessary.   I formulated and edited as necessary the assessment and plan and discussed the findings and management plan with the patient and family    Tamera Walsh MD   of Ophthalmology.  Retina Service   Department of Ophthalmology and Visual Neurosciences   [facility]  Phone: [phone]   Fax: [phone]

## 2019-01-10 NOTE — NURSING NOTE
Patient presents for 4wk Follow up Post LPI right eye on 12/13/2018. Since the last visit patient has had pressure sensation in the eye, when decreased acetazolamide from 1500 to 1000mg on 1/2/19, the vision improved. No pain or discomfort, itching or tears. Some redness, goes away after drop use. No flashes or floaters. Consistent with eye drops. Patient has been wearing a patch over the right eye to focus with the LE. Elvie Coyne COT 11:34 AM January 10, 2019

## 2019-01-10 NOTE — NURSING NOTE
Retinal evaluation.  LPI right eye done at last visit with Dr. Justice. Patient presents for 4wk Follow up Post LPI right eye on 12/13/2018 for Recent retinal detachment of right eye, total/subtotal. Since the last visit patient has had pressure sensation in the eye, when decreased acetazolamide from 1500 to 1000mg on 1/2/19, the vision improved. No pain or discomfort, itching or tears. Some redness, goes away after drop use. No flashes or floaters. Consistent with eye drops. Patient has been wearing a patch over the right eye to focus with the LE. Elvie Coyne COT 11:34 AM January 10, 2019

## 2019-01-10 NOTE — PROGRESS NOTES
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 He then subsequently developed traumatic endophthalmitis, peripheral RD with PVR and is now POM#1.5 s/p SB/PPV/MP/retinectomy/vancomycin intravit 11/20/18. Patient also has had recent LPI by Dr. Justice. Referred from Dr. Walsh today for new epi defect. Patient denies any significant pain.      PAST OCULAR SURGERY  PPV/PPL/IOFB removal/OGR/ABx OD 11/2/18  ()  PPV/Vit Bx/ABx OD 11/4/18 ()  PPV/SBP/MS/retinectomy/SO 1000/IVT vanco OD 11/20/18 ()  Peripheral iridotomy     OphthoMeds:  vigamox bid RE  Prednisolone bid RE  Alphagan bid RE (decreased today)  Xalatan at bedtime RE  Cosopt BID RE    A&P:    1. New epi defect right eye  -no infiltrate, may have neurotrophic component as denies pain  -will place BCL, cont moxi bid  -PFATs      2. S/p OGR with corneal laceration right eye  -will keep corneal sutures in place for now, consider removal in future    3. OHTN:   -seen by Reshma earlier today  -h/o LPI   -IOP ok today, diamox stopped and alphagan decreased by Dr. Justice today  -cont cosopt and alphagan  -to see Dr. Justice in 1 week for IOP check    4. H/o Retinal detachment and endophthalmitis  -follows with Dr. Walsh  -no concern for endophthalmitis at this time    RTC 1-2 weeks, coordinate with Dr. Justice appointment      Complete documentation of historical and exam elements from today's encounter can be found in the full encounter summary report (not reduplicated in this progress note). I personally obtained the chief complaint(s) and history of present illness.  I confirmed and edited as necessary the review of systems, past medical/surgical history, family history, social history, and examination findings as documented by others; and I examined  the patient myself. I personally reviewed the relevant tests, images, and reports as documented above. I formulated and edited as necessary the assessment and plan and discussed the findings and management plan with the patient and family.     Nancy Jean MD

## 2019-01-17 ENCOUNTER — OFFICE VISIT (OUTPATIENT)
Dept: OPHTHALMOLOGY | Facility: CLINIC | Age: 39
End: 2019-01-17
Attending: OPHTHALMOLOGY
Payer: COMMERCIAL

## 2019-01-17 DIAGNOSIS — H40.89 GLAUCOMA DUE TO COMBINATION OF MECHANISMS: Primary | ICD-10-CM

## 2019-01-17 DIAGNOSIS — H18.30 CORNEAL EPITHELIAL DEFECT: Primary | ICD-10-CM

## 2019-01-17 PROCEDURE — G0463 HOSPITAL OUTPT CLINIC VISIT: HCPCS | Mod: ZF

## 2019-01-17 RX ORDER — DORZOLAMIDE HYDROCHLORIDE AND TIMOLOL MALEATE 20; 5 MG/ML; MG/ML
1 SOLUTION/ DROPS OPHTHALMIC 2 TIMES DAILY
Qty: 1 BOTTLE | Refills: 11 | Status: SHIPPED | OUTPATIENT
Start: 2019-01-17 | End: 2019-02-27 | Stop reason: ALTCHOICE

## 2019-01-17 ASSESSMENT — VISUAL ACUITY
METHOD: SNELLEN - LINEAR
OD_PH_SC: 20/300
CORRECTION_TYPE: GLASSES
OD_SC: 20/400 ECC
METHOD: SNELLEN - LINEAR
OS_CC: 20/20
OD_PH_SC: 20/300
OS_CC: 20/20
OD_SC: 20/400 ECC

## 2019-01-17 ASSESSMENT — SLIT LAMP EXAM - LIDS
COMMENTS: NORMAL

## 2019-01-17 ASSESSMENT — TONOMETRY
OS_IOP_MMHG: 15
IOP_METHOD: APPLANATION
OD_IOP_MMHG: 12
OD_IOP_MMHG: 12
IOP_METHOD: APPLANATION
OS_IOP_MMHG: 15

## 2019-01-17 ASSESSMENT — EXTERNAL EXAM - RIGHT EYE
OD_EXAM: NORMAL
OD_EXAM: NORMAL

## 2019-01-17 ASSESSMENT — EXTERNAL EXAM - LEFT EYE
OS_EXAM: NORMAL
OS_EXAM: NORMAL

## 2019-01-17 ASSESSMENT — CONF VISUAL FIELD
OS_INFERIOR_TEMPORAL_RESTRICTION: 3
OS_SUPERIOR_TEMPORAL_RESTRICTION: 3
OS_INFERIOR_TEMPORAL_RESTRICTION: 3
OS_SUPERIOR_NASAL_RESTRICTION: 3
OS_INFERIOR_NASAL_RESTRICTION: 3
OS_SUPERIOR_TEMPORAL_RESTRICTION: 3
OD_NORMAL: 1
OS_INFERIOR_NASAL_RESTRICTION: 3
OS_SUPERIOR_NASAL_RESTRICTION: 3

## 2019-01-17 NOTE — NURSING NOTE
Chief Complaints and History of Present Illnesses   Patient presents with     Corneal Abrasion Follow Up     Chief Complaint(s) and History of Present Illness(es)     Corneal Abrasion Follow Up     Laterality: right eye    Duration: 1 week    Associated symptoms: blurred vision and redness    Pain scale: 0/10              Comments     Pt here for f/u epi defect, right eye  Pt reports vision is hazy/blurred    Lisa COONEY 1:27 PM January 17, 2019

## 2019-01-17 NOTE — PATIENT INSTRUCTIONS
Patient will continue on Cosopt (Timolol/Dorzolamide) which is a blue top drop 2x/day (12 hours apart) in the RIGHT EYE, Alphagan (Brimonidine) which is a purple top drop 2x/day (12 hours apart) in the RIGHT EYE, Latanoprost which is a teal top drop at bedtime in the RIGHT EYE.  Patient will discontinue and hold onto the DIamox 500mg Sequels.  Patient will continue on Prednisolone and keep previously scheduled follow up visit with Dr. Walsh and return to the glaucoma clinic sooner for elevated eye pressures.  Patient will get a repeat IOP check 2-3 months.

## 2019-01-17 NOTE — PROGRESS NOTES
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 Walsh   3)Aphakia OS -- lives near [place], [place]  4)K Scar/laceration with sutures OD  5)H/O Blunt trauma OD     MD: rec mCPC prior to tube -- Dr. Walsh is considering SiO removal in 3 months -- could try to coordinate PP tube at time of oil removal pending future IOPs    Patient will continue on Cosopt (Timolol/Dorzolamide) which is a blue top drop 2x/day (12 hours apart) in the RIGHT EYE, Alphagan (Brimonidine) which is a purple top drop 2x/day (12 hours apart) in the RIGHT EYE, Latanoprost which is a teal top drop at bedtime in the RIGHT EYE.  Patient will discontinue and hold onto the DIamox 500mg Sequels.  Patient will continue on Prednisolone and keep previously scheduled follow up visit with Dr. Walsh and return to the glaucoma clinic sooner for elevated eye pressures.  Patient will get a repeat IOP check 2-3 months.        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 Seen in retina without evidence of SO migration and good IOP  IOP issues this past week in [place] with IOPs in 60s and was considering  emergent Ahmed tube surgery 12/11/18 but after discussion patient decided to hold off due to concerns for significant post-op complications; advised patient to start Diamox 500 mg TID, continue cosopt and xalatan, add alphagan     Presents today for IOP check after discontinuing Diamox. Currently IOP 12.    Continue current management.    Diana Hernandez MD  PGY-3 Ophthalmology      Attending Physician Attestation:  Complete documentation of historical and exam elements from today's encounter can be found in the full encounter summary report (not reduplicated in this progress note). I personally obtained the chief complaint(s) and history of present illness.  I confirmed and edited as necessary the review of systems, past medical/surgical history, family history, social history, and examination findings as documented by others; and I examined the patient myself. I personally reviewed the relevant tests, images, and reports as documented above. I formulated and edited as necessary the assessment and plan and discussed the findings and management plan with the patient and family.  - Agnes Justice MD

## 2019-01-17 NOTE — PROGRESS NOTES
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 He then subsequently developed traumatic endophthalmitis, peripheral RD with PVR and is now POM#1.5 s/p SB/PPV/MP/retinectomy/vancomycin intravit 11/20/18. Patient also has had recent LPI by Dr. Justice. Referred from Dr. Walsh today for new epi defect. Patient denies any significant pain.      PAST OCULAR SURGERY  PPV/PPL/IOFB removal/OGR/ABx OD 11/2/18  ()  PPV/Vit Bx/ABx OD 11/4/18 ()  PPV/SBP/MS/retinectomy/SO 1000/IVT vanco OD 11/20/18 ()  Peripheral iridotomy     OphthoMeds:  vigamox bid RE  Prednisolone bid RE  Alphagan bid RE (decreased today)  Xalatan at bedtime RE  Cosopt BID RE    A&P:    1. New epi defect right eye  -no infiltrate, may have neurotrophic component as denies pain  -epi defect resolved but still irregular epi  -BCL removed  -start celluvisc q2h  -cont vigamox bid      2. S/p OGR with corneal laceration right eye  -will keep corneal sutures in place for now, consider removal in future    3. OHTN:   -h/o LPI   -follows with Dr. Justice, seen today, continue alphagan and cosopt    4. H/o Retinal detachment and endophthalmitis  -follows with Dr. Walsh  -no concern for endophthalmitis at this time    RTC 2 weeks    Complete documentation of historical and exam elements from today's encounter can be found in the full encounter summary report (not reduplicated in this progress note). I personally obtained the chief complaint(s) and history of present illness.  I confirmed and edited as necessary the review of systems, past medical/surgical history, family history, social history, and examination findings as documented by others; and I examined the patient myself. I personally reviewed the relevant tests, images, and reports as  documented above. I formulated and edited as necessary the assessment and plan and discussed the findings and management plan with the patient and family.     Nancy Jean MD

## 2019-01-18 ENCOUNTER — TELEPHONE (OUTPATIENT)
Dept: OPHTHALMOLOGY | Facility: CLINIC | Age: 39
End: 2019-01-18

## 2019-01-18 DIAGNOSIS — H40.89 GLAUCOMA DUE TO COMBINATION OF MECHANISMS: ICD-10-CM

## 2019-01-18 RX ORDER — TIMOLOL MALEATE 5 MG/ML
1 SOLUTION/ DROPS OPHTHALMIC EVERY MORNING
Qty: 1 BOTTLE | Refills: 11 | Status: SHIPPED | OUTPATIENT
Start: 2019-01-18 | End: 2019-07-17

## 2019-01-18 RX ORDER — DORZOLAMIDE HCL 20 MG/ML
1 SOLUTION/ DROPS OPHTHALMIC 2 TIMES DAILY
Qty: 1 BOTTLE | Refills: 11 | Status: SHIPPED | OUTPATIENT
Start: 2019-01-18 | End: 2019-03-27

## 2019-01-18 NOTE — TELEPHONE ENCOUNTER
cosopt split into 2 meds that make up cosopt while cosopt on backorder  Max Patel RN 11:03 AM 01/18/19

## 2019-01-18 NOTE — TELEPHONE ENCOUNTER
[facility] Call Center    Phone Message    May a detailed message be left on voicemail: no    Reason for Call: Other: Maida with [facility] called stating that they do not have the rx, they have to break it up into 2 rx. The rx she is referring to isdorzolamide-timolol (COSOPT) 2-0.5 % ophthalmic. If you have any questions please call her at [phone] thank you.      Action Taken: Message routed to:  [facility] ([facility] Eye

## 2019-01-20 DIAGNOSIS — H44.001 ACUTE ENDOPHTHALMITIS OF RIGHT EYE: ICD-10-CM

## 2019-01-20 DIAGNOSIS — H40.053 INCREASED INTRAOCULAR PRESSURE, BILATERAL: ICD-10-CM

## 2019-01-20 DIAGNOSIS — Z98.890 S/P EYE SURGERY: ICD-10-CM

## 2019-01-21 RX ORDER — BRIMONIDINE TARTRATE 2 MG/ML
1 SOLUTION/ DROPS OPHTHALMIC 2 TIMES DAILY
Qty: 5 ML | Refills: 1 | Status: SHIPPED | OUTPATIENT
Start: 2019-01-21 | End: 2019-03-19

## 2019-01-21 RX ORDER — LATANOPROST 50 UG/ML
1 SOLUTION/ DROPS OPHTHALMIC AT BEDTIME
Qty: 2.5 ML | Refills: 1 | Status: SHIPPED | OUTPATIENT
Start: 2019-01-21 | End: 2019-04-24

## 2019-01-21 RX ORDER — PREDNISOLONE ACETATE 10 MG/ML
1 SUSPENSION/ DROPS OPHTHALMIC 2 TIMES DAILY
Qty: 5 ML | Refills: 0 | Status: SHIPPED | OUTPATIENT
Start: 2019-01-21 | End: 2019-02-09

## 2019-01-21 RX ORDER — MOXIFLOXACIN 5 MG/ML
1 SOLUTION/ DROPS OPHTHALMIC 2 TIMES DAILY
Qty: 3 ML | Refills: 1 | Status: SHIPPED | OUTPATIENT
Start: 2019-01-21 | End: 2019-04-24

## 2019-01-21 NOTE — TELEPHONE ENCOUNTER
"  latanoprost (XALATAN) 0.005 % ophthalmic solution   Last Written Prescription Date:  11/25/18  Last Fill Quantity: 2.5ml,   # refills: 1  Last Office Visit : 1/17/19  Future Office visit: 1/29/19 1/17/19  [place]  \"Latanoprost which is a teal top drop at bedtime in the RIGHT EYE. \"      moxifloxacin (VIGAMOX) 0.5 % ophthalmic solution  Last Written Prescription Date:  11/14/18  Last Fill Quantity: 1 bottle,   # refills: 1    1/17/19   Nancy Jean MD   \" cont vigamox bid\"      brimonidine (ALPHAGAN)  Last Written Prescription Date:  12/24/18  Last Fill Quantity: 1 bottle,   # refills: 0    1/17/19  Reshma    Alphagan (Brimonidine) which is a purple top drop 2x/day (12 hours apart) in the RIGHT EYE,       prednisoLONE acetate (PRED FORTE) 1 % ophthalmic susp  Last Written Prescription Date:  11/14/18  Last Fill Quantity: 1 bottle   # refills: 0  1/17/19  Ted   Prednisolone bid RE        Routing refill request to provider for review/approval because:  Latanoprost  Dx?    moxifloxacin (VIGAMOX)  Med list has rt eye qid, note has bid. Entered per note. Please verify entry.  Prednisolone provider review required.  "

## 2019-01-29 ENCOUNTER — OFFICE VISIT (OUTPATIENT)
Dept: OPHTHALMOLOGY | Facility: CLINIC | Age: 39
End: 2019-01-29
Attending: OPHTHALMOLOGY
Payer: COMMERCIAL

## 2019-01-29 DIAGNOSIS — Z98.890 S/P EYE SURGERY: Primary | ICD-10-CM

## 2019-01-29 DIAGNOSIS — H18.30 CORNEAL EPITHELIAL DEFECT: ICD-10-CM

## 2019-01-29 PROCEDURE — G0463 HOSPITAL OUTPT CLINIC VISIT: HCPCS | Mod: ZF

## 2019-01-29 ASSESSMENT — VISUAL ACUITY
OS_CC: 20/20
OD_SC: 20/400
METHOD: SNELLEN - LINEAR
CORRECTION_TYPE: GLASSES

## 2019-01-29 ASSESSMENT — TONOMETRY
OS_IOP_MMHG: 14
OD_IOP_MMHG: 07
IOP_METHOD: ICARE

## 2019-01-29 ASSESSMENT — REFRACTION_WEARINGRX
OS_SPHERE: -3.25
OD_SPHERE: -3.00
OS_CYLINDER: SPHERE
OD_CYLINDER: SPHERE

## 2019-01-29 ASSESSMENT — EXTERNAL EXAM - RIGHT EYE: OD_EXAM: NORMAL

## 2019-01-29 ASSESSMENT — CONF VISUAL FIELD
OD_SUPERIOR_TEMPORAL_RESTRICTION: 3
OS_NORMAL: 1
OD_INFERIOR_TEMPORAL_RESTRICTION: 3

## 2019-01-29 ASSESSMENT — SLIT LAMP EXAM - LIDS
COMMENTS: NORMAL
COMMENTS: NORMAL

## 2019-01-29 ASSESSMENT — EXTERNAL EXAM - LEFT EYE: OS_EXAM: NORMAL

## 2019-01-29 NOTE — NURSING NOTE
Chief Complaints and History of Present Illnesses   Patient presents with     Follow Up     Chief Complaint(s) and History of Present Illness(es)     Follow Up     Laterality: right eye    Pain scale: 2/10              Comments     Follow up for epi defect right eye.  The patient notes that the first week after his last visit he had soreness and redness.  He has a slight improvement the last couple of days.  EROS Pal 8:29 AM 01/29/2019

## 2019-01-29 NOTE — PROGRESS NOTES
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 He then subsequently developed traumatic endophthalmitis, peripheral RD with PVR and is now POM#1.5 s/p SB/PPV/MP/retinectomy/vancomycin intravit 11/20/18. Patient also has had recent LPI by Dr. Justice. Referred from Dr. Walsh today for new epi defect. Patient denies any significant pain.    Interval: Patient states he has had some intermittent redness and discomfort that was relieved by instilling alphagan.      PAST OCULAR SURGERY  PPV/PPL/IOFB removal/OGR/ABx OD 11/2/18  ()  PPV/Vit Bx/ABx OD 11/4/18 ()  PPV/SBP/MS/retinectomy/SO 1000/IVT vanco OD 11/20/18 ()  Peripheral iridotomy     OphthoMeds:  vigamox bid RE  Prednisolone bid RE  Alphagan bid RE   Xalatan at bedtime RE  Cosopt BID RE  Celluvisc q2h    A&P:    1. New epi defect right eye  -no infiltrate, may have neurotrophic component as denies pain  -epi defect healed last visit, BCL removed but today again has large epi defect  -central suture removed but small leak from suture track, will hold on removing others  -small slow pinpoint leak only, will place BCL  -vigamox qid x 4 days then decrease to bid  -cont celluvisc vs. PFATs q2h   -stop prednisolone     2. S/p OGR with corneal laceration right eye  -will keep corneal sutures in place for now, consider removal in future    3. OHTN:   -h/o LPI   -follows with Dr. Justice, seen today, continue alphagan and cosopt    4. H/o Retinal detachment and endophthalmitis  -follows with Dr. Walsh  -no concern for endophthalmitis at this time    Nancy Jean MD  PGY-5, Cornea Fellow      ~~~~~~~~~~~~~~~~~~~~~~~~~~~~~~~~~~~~~~~~~~~~~~~~~~~~~~~~~~~~~~~~    Complete documentation of historical and exam elements from today's encounter can be found  in the full encounter summary report (not reduplicated in this progress note). I personally obtained the chief complaint(s) and history of present illness.  I confirmed and edited as necessary the review of systems, past medical/surgical history, family history, social history, and examination findings as documented by others.  I examined the patient myself, and I personally reviewed the relevant tests, images, and reports as documented above. I formulated and edited as necessary the assessment and plan and discussed the findings and management plan with the patient and family.     Otoniel Pete MD, MA  Director, Cornea & Anterior Segment  [facility] Department of Ophthalmology & Visual Neuroscience

## 2019-02-05 ENCOUNTER — TELEPHONE (OUTPATIENT)
Dept: OPHTHALMOLOGY | Facility: CLINIC | Age: 39
End: 2019-02-05

## 2019-02-06 NOTE — TELEPHONE ENCOUNTER
Received call from patient's spouse questioning as to whether patient is able to take Mucinex (Guaifenisin) for ongoing cough. Patient is on several IOP lowering drops and she wanted to know if there is a known side effect of elevated eye pressure with guaifenesin. Discussed that to my knowledge there is not a well-known effect of guaifenesin on IOP. Recommended reading medication packaging as well as contacting a pharmacist if she has additional drug interaction questions.    Mesha Tracy MD  Ophthalmology Resident, PGY-2

## 2019-02-07 ENCOUNTER — MYC MEDICAL ADVICE (OUTPATIENT)
Dept: OPHTHALMOLOGY | Facility: CLINIC | Age: 39
End: 2019-02-07

## 2019-02-07 NOTE — PROGRESS NOTES
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 He then subsequently developed traumatic endophthalmitis, peripheral RD with PVR and is now POM#1.5 s/p SB/PPV/MP/retinectomy/vancomycin intravit 11/20/18. Patient also has had recent LPI by Dr. Justice. Referred from Dr. Walsh today for new epi defect. Patient denies any significant pain.     Interval: Here for follow up- found to have new epi defect 1/29/19 and started on vigamox with BCL placed. Has intermittent discomfort- comes and goes. Vision stable. Thinks BCL is still in place.       PAST OCULAR SURGERY  PPV/PPL/IOFB removal/OGR/ABx OD 11/2/18  ()  PPV/Vit Bx/ABx OD 11/4/18 ()  PPV/SBP/MS/retinectomy/SO 1000/IVT vanco OD 11/20/18 ()  Peripheral iridotomy      OphthoMeds:  vigamox BID RE (used QID x 4 days, then switched to BID)  Alphagan bid RE   Xalatan at bedtime RE  Cosopt BID RE  Celluvisc q2h    Stopped pred last visit     A&P:     1. Epi defect right eye - resolved  -no infiltrate, may have neurotrophic component as denies pain  -had 4.5 x5mm epi defect diagnosed last visit - BCL placed at that time  -today after BCL removal, epi is in tact although slightly irregular  -hold on replacing BCL  -instead, start erythromycin ointment BID right eye  -hold vigamox - will use again after future suture removal  -continue aggressive lubrication with Celluvisc q2    2. S/p OGR with corneal laceration right eye  -will keep corneal sutures in place for now, consider removal at next visit     3. OHTN:   -h/o LPI   -follows with Dr. Justice  -IOP excellent, continue alphagan, cosopt, latanoprost RE     4. H/o Retinal detachment and endophthalmitis  -follows with Dr. Walsh - next 2/21/19    F/u Retina as scheduled.  To see cornea same day.    Hallie  MD Kylee  PGY-5, Cornea Fellow  Ophthalmology    Complete documentation of historical and exam elements from today's encounter can be found in the full encounter summary report (not reduplicated in this progress note). I personally obtained the chief complaint(s) and history of present illness.  I confirmed and edited as necessary the review of systems, past medical/surgical history, family history, social history, and examination findings as documented by others; and I examined the patient myself. I personally reviewed the relevant tests, images, and reports as documented above. I formulated and edited as necessary the assessment and plan and discussed the findings and management plan with the patient and family.     Hallie Pichardo MD  Cornea Fellow

## 2019-02-07 NOTE — TELEPHONE ENCOUNTER
Reviewed with facilitator  Scheduled Saturday with dr. Pichardo at [facility] [place] [facility] 4th floor     Pt aware of date/time/location  Max Patel RN 8:46 AM 02/07/19

## 2019-02-09 ENCOUNTER — OFFICE VISIT (OUTPATIENT)
Dept: OPHTHALMOLOGY | Facility: CLINIC | Age: 39
End: 2019-02-09
Payer: COMMERCIAL

## 2019-02-09 DIAGNOSIS — S05.31XD: ICD-10-CM

## 2019-02-09 DIAGNOSIS — H18.30 CORNEAL EPITHELIAL DEFECT: Primary | ICD-10-CM

## 2019-02-09 RX ORDER — ERYTHROMYCIN 5 MG/G
0.5 OINTMENT OPHTHALMIC 2 TIMES DAILY
Qty: 3.5 G | Refills: 1 | Status: SHIPPED | OUTPATIENT
Start: 2019-02-09 | End: 2019-03-27

## 2019-02-09 ASSESSMENT — REFRACTION_WEARINGRX
OS_SPHERE: -3.25
OS_CYLINDER: SPHERE
SPECS_TYPE: SVL
OD_SPHERE: -3.00
OD_CYLINDER: SPHERE

## 2019-02-09 ASSESSMENT — CONF VISUAL FIELD
OD_SUPERIOR_NASAL_RESTRICTION: 3
OD_INFERIOR_NASAL_RESTRICTION: 3
OS_NORMAL: 1
METHOD: COUNTING FINGERS

## 2019-02-09 ASSESSMENT — EXTERNAL EXAM - RIGHT EYE: OD_EXAM: NORMAL

## 2019-02-09 ASSESSMENT — TONOMETRY
OS_IOP_MMHG: 11
IOP_METHOD: ICARE
OD_IOP_MMHG: 7

## 2019-02-09 ASSESSMENT — SLIT LAMP EXAM - LIDS
COMMENTS: NORMAL
COMMENTS: NORMAL

## 2019-02-09 ASSESSMENT — VISUAL ACUITY
CORRECTION_TYPE: GLASSES
OS_CC: 20/20
METHOD: SNELLEN - LINEAR
OD_CC: 20/500

## 2019-02-09 ASSESSMENT — EXTERNAL EXAM - LEFT EYE: OS_EXAM: NORMAL

## 2019-02-09 NOTE — NURSING NOTE
"Chief Complaints and History of Present Illnesses   Patient presents with     Follow Up     Chief Complaint(s) and History of Present Illness(es)     Follow Up     Laterality: right eye    Quality: blurred vision    Severity: moderate    Frequency: constantly    Course: gradually improving    Associated symptoms: eye pain, redness and tearing    Treatments tried: eye drops, artificial tears and ointment    Response to treatment: mild improvement    Pain scale: 2/10              Comments     2wk f/u of epi defect right eye (POM#2 s/p SB/PPV/MP/retinectomy/vancomycin intravit 11/20/18)    Vision is still \"blurry\" since LV; Pt attributes this to the BCL.     Ocular pain is constant and \"annoying\" but \"manageable.\" RE pain can be induced w/increased ocular movement. Takes tylenol which helps.      Redness \"seems worse,\" since LV. Matter/discharge occurs qam/pm but is translucent in color.     Ocular meds RE: vigamox qid, celluvisc/PFATs q2hr +, brimonidine bid, latanoprost at bedtime, dorzolamide + timolol (cosopt) bid     Natalya Tracy COT 8:29 AM February 9, 2019                   "

## 2019-02-12 ENCOUNTER — MYC MEDICAL ADVICE (OUTPATIENT)
Dept: OPHTHALMOLOGY | Facility: CLINIC | Age: 39
End: 2019-02-12

## 2019-02-13 ENCOUNTER — OFFICE VISIT (OUTPATIENT)
Dept: OPHTHALMOLOGY | Facility: CLINIC | Age: 39
End: 2019-02-13
Attending: OPHTHALMOLOGY
Payer: COMMERCIAL

## 2019-02-13 DIAGNOSIS — S05.31XD: ICD-10-CM

## 2019-02-13 DIAGNOSIS — H18.30 CORNEAL EPITHELIAL DEFECT: Primary | ICD-10-CM

## 2019-02-13 PROCEDURE — G0463 HOSPITAL OUTPT CLINIC VISIT: HCPCS | Mod: ZF

## 2019-02-13 ASSESSMENT — REFRACTION_WEARINGRX
OD_CYLINDER: SPHERE
SPECS_TYPE: SVL
OS_SPHERE: -3.25
OD_SPHERE: -3.00
OS_CYLINDER: SPHERE

## 2019-02-13 ASSESSMENT — VISUAL ACUITY
OD_SC: 20/500
CORRECTION_TYPE: GLASSES
OS_CC: 20/20
METHOD: SNELLEN - LINEAR

## 2019-02-13 ASSESSMENT — EXTERNAL EXAM - RIGHT EYE: OD_EXAM: NORMAL

## 2019-02-13 ASSESSMENT — TONOMETRY
OD_IOP_MMHG: 07
IOP_METHOD: TONOPEN
OS_IOP_MMHG: 10

## 2019-02-13 ASSESSMENT — CONF VISUAL FIELD
OD_SUPERIOR_TEMPORAL_RESTRICTION: 3
OS_NORMAL: 1

## 2019-02-13 ASSESSMENT — EXTERNAL EXAM - LEFT EYE: OS_EXAM: NORMAL

## 2019-02-13 ASSESSMENT — SLIT LAMP EXAM - LIDS
COMMENTS: NORMAL
COMMENTS: NORMAL

## 2019-02-13 NOTE — NURSING NOTE
Chief Complaints and History of Present Illnesses   Patient presents with     Follow Up     Chief Complaint(s) and History of Present Illness(es)     Follow Up     Laterality: right eye    Pain scale: 8/10              Comments     Follow up for right eye pain since BCL removal on Saturday, February 9.   The patient is using Celluvisc to try to relieve the right eye pain.    EROS Pal 1:21 PM 02/13/2019

## 2019-02-13 NOTE — PROGRESS NOTES
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 Pichardo 2/9/19 for epi defect.     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 He then subsequently developed traumatic endophthalmitis, peripheral RD with PVR and is now POM#1.5 s/p SB/PPV/MP/retinectomy/vancomycin intravit 11/20/18. Patient also has had recent LPI by Dr. Justice. Referred from Dr. Walsh today for new epi defect. Patient denies any significant pain. Found to have new epi defect 1/29/19 and started on vigamox with BCL placed.     Interval:   At appointment 2/9/19, Dr. Pichardo removed the bandage contact lens which had been placed 1/29/19. Patient noted onset of severe eye pain on the way home from the eye clinic (lives 3.5hrs away) and this has remained constant all day throughout the past 3 days. He describes significant scratchy eye pain which is temporarily improved with celluvisc drops - states sometimes pain is relieved for a few minutes, sometimes longer.    Continues to use glaucoma drops as below.       PAST OCULAR SURGERY  PPV/PPL/IOFB removal/OGR/ABx OD 11/2/18  ()  PPV/Vit Bx/ABx OD 11/4/18 ()  PPV/SBP/MS/retinectomy/SO 1000/IVT vanco OD 11/20/18 ()  Peripheral iridotomy      OphthoMeds:    erythro ointment 2-4x/day RE  Alphagan bid RE   Xalatan at bedtime RE  Cosopt BID RE   Celluvisc q2hr RE    Stopped pred last visit     A&P:     1. Epi defect right eye - recurred  -4x4mm central epi defect without infiltrate today - replaced BCL right eye today  -restart vigamox 4x/day right eye   -stop erythromycin ointment and celluvisc right eye  -continue aggressive lubrication with preservative free artificial tears q2hr    2. S/p OGR with corneal laceration right eye  -will keep corneal sutures in place for now, consider removal at next visit     3. OHTN:   -h/o LPI   -follows with Dr. Justice  -IOP excellent, continue  alphagan, cosopt, latanoprost RE     4. H/o Retinal detachment and endophthalmitis  -follows with Dr. Walsh - next 2/21/19    F/u Retina as scheduled 2/21/19  To see cornea same day    Bel Lyon MD   Ophthalmology PGY-4    Teaching Statement  I did not examine the patient, but was available to see the patient if needed. I have discussed the case with the resident and the assessment and plan as documented seems reasonable to me.    Lisa Parker MD  Comprehensive Ophthalmology & Ocular Pathology  Department of Ophthalmology and Visual Neurosciences  [email or web address]  Pager [phone]

## 2019-02-14 ENCOUNTER — MYC MEDICAL ADVICE (OUTPATIENT)
Dept: OPHTHALMOLOGY | Facility: CLINIC | Age: 39
End: 2019-02-14

## 2019-02-18 DIAGNOSIS — S05.31XD RUPTURED GLOBE OF RIGHT EYE, SUBSEQUENT ENCOUNTER: Primary | ICD-10-CM

## 2019-02-21 ENCOUNTER — OFFICE VISIT (OUTPATIENT)
Dept: OPHTHALMOLOGY | Facility: CLINIC | Age: 39
End: 2019-02-21
Attending: OPHTHALMOLOGY
Payer: COMMERCIAL

## 2019-02-21 DIAGNOSIS — H18.899 PERSISTENT CORNEAL EPITHELIAL DEFECT: ICD-10-CM

## 2019-02-21 DIAGNOSIS — S05.31XD RUPTURED GLOBE OF RIGHT EYE, SUBSEQUENT ENCOUNTER: ICD-10-CM

## 2019-02-21 DIAGNOSIS — Z48.810 AFTERCARE FOLLOWING SURGERY OF A SENSORY ORGAN: ICD-10-CM

## 2019-02-21 DIAGNOSIS — H44.001 RIGHT ENDOPHTHALMIA: Primary | ICD-10-CM

## 2019-02-21 DIAGNOSIS — S05.01XD ABRASION OF RIGHT CORNEA, SUBSEQUENT ENCOUNTER: ICD-10-CM

## 2019-02-21 PROCEDURE — 92134 CPTRZ OPH DX IMG PST SGM RTA: CPT | Mod: ZF | Performed by: OPHTHALMOLOGY

## 2019-02-21 PROCEDURE — G0463 HOSPITAL OUTPT CLINIC VISIT: HCPCS | Mod: 25

## 2019-02-21 PROCEDURE — 65778 COVER EYE W/MEMBRANE: CPT | Mod: ZF | Performed by: OPHTHALMOLOGY

## 2019-02-21 PROCEDURE — 92250 FUNDUS PHOTOGRAPHY W/I&R: CPT | Mod: XS | Performed by: OPHTHALMOLOGY

## 2019-02-21 ASSESSMENT — TONOMETRY
IOP_METHOD: TONOPEN
OS_IOP_MMHG: 17
OD_IOP_MMHG: 15

## 2019-02-21 ASSESSMENT — VISUAL ACUITY
METHOD: SNELLEN - LINEAR
CORRECTION_TYPE: GLASSES
OS_SC: 20/15
OD_SC: 20/400 SEARCHING

## 2019-02-21 ASSESSMENT — EXTERNAL EXAM - LEFT EYE: OS_EXAM: NORMAL

## 2019-02-21 ASSESSMENT — SLIT LAMP EXAM - LIDS
COMMENTS: NORMAL
COMMENTS: NORMAL

## 2019-02-21 ASSESSMENT — CONF VISUAL FIELD
OS_NORMAL: 1
METHOD: COUNTING FINGERS
OD_SUPERIOR_TEMPORAL_RESTRICTION: 3

## 2019-02-21 ASSESSMENT — EXTERNAL EXAM - RIGHT EYE: OD_EXAM: NORMAL

## 2019-02-21 ASSESSMENT — CUP TO DISC RATIO: OD_RATIO: 0.2

## 2019-02-21 NOTE — NURSING NOTE
Chief Complaint(s) and History of Present Illness(es)     Follow Up     In right eye.  Associated symptoms include eye pain (Pt note RE feeling sore. ), redness and tearing.  Negative for dryness.              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 Pt notes RE is doing to bad, but since last week pt has had some pretty bad headaches. Pt did take some tylenol today to help with the headaches. Pt notes some scratchiness with the BCL in RE, but not as much as before. Still drops as directed. Moxifloxacin ws changed to QID RE.    Nohemy Gibbs, COMT 11:24 AM February 21, 2019

## 2019-02-21 NOTE — PROGRESS NOTES
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 Seen in retina without evidence of SO migration and good IOP  IOP issues this past week in [place] with IOPs in 60s and was considering emergent Ahmed tube surgery 18 but after discussion decided to hold off due to concerns for significant post-op complications; advised patient to start Diamox 500 mg TID, continue cosopt and xalatan, add alphagan. Had repeat LPI due to non-patent PI 2018.    Presents today with much improved pain. IOP 9 today. Tolerating diamox well (taste changes but no nausea / vomiting)    PAST OCULAR SURGERY  PPV/PPL/IOFB removal/OGR/ABx OD 18  ()  PPV/Vit Bx/ABx OD 18 ()  PPV/SBP/MS/retinectomy/SO 1000/IVT vanco OD 18 ()    Imagin18   SL consistent with exam. periperal iridotomy patent (after today's laser periperal iridotomy)  optos fundus pic consistent with exam- retina attached     Optical Coherence Tomography: 19   Right eye: slightly irregularity of the retina surface; mild subfoveal irregularity of the IS/OS and Retinal pigment epithelium   Left eye: good foveal contour    optos image consistent with exam    On examination patient has recurrent non healing K epi defect despite multiple CL placements x3   K edema  JACOB: 20/150    Assessment:  Retina attached  Doing well  IOP excellent today  Plan for prokera given non healing K epi defect     Plan:  Retina detachment and endophthalmitis precautions were discussed with the patient and was asked to return if any of the those occur    Current Medications to operative eye right eye   vigamox four times a day    Xalatan OD QHS  Pred Forte taper to  twice a day   Timolol 0.5% twice a day   Dorzolamide twice a day   Alphagan OD twice a day   systane as needed    prokera today     Follow up in 1 week retina with ultrasound   Follow up with cornea same day   Might consider Silicone Oil removal     ~~~~~~~~~~~~~~~~~~~~~~~~~~~~~~~~~~   Complete documentation of historical and exam elements from today's encounter can be found in the full encounter summary report (not reduplicated in this progress note).  I personally obtained the chief complaint(s) and history of present illness.  I confirmed and edited as necessary the review of systems, past medical/surgical history, family history, social history, and examination findings as documented by others; and I examined the patient myself.  I personally reviewed the relevant tests, images, and reports as documented above.  I personally reviewed the ophthalmic test(s) associated with this encounter, agree with the interpretation(s) as documented by the resident/fellow, and have edited the corresponding report(s) as necessary.   I formulated and edited as necessary the assessment and plan and discussed the findings and management plan with the patient and family    Tamera Walsh MD   of Ophthalmology.  Retina Service   Department of Ophthalmology and Visual Neurosciences   [facility]  Phone: [phone]   Fax: [phone]

## 2019-02-27 ENCOUNTER — OFFICE VISIT (OUTPATIENT)
Dept: OPHTHALMOLOGY | Facility: CLINIC | Age: 39
End: 2019-02-27
Attending: OPHTHALMOLOGY
Payer: COMMERCIAL

## 2019-02-27 DIAGNOSIS — Z48.810 AFTERCARE FOLLOWING SURGERY OF A SENSORY ORGAN: Primary | ICD-10-CM

## 2019-02-27 DIAGNOSIS — H33.051 OLD TOTAL RETINAL DETACHMENT OF RIGHT EYE: Primary | ICD-10-CM

## 2019-02-27 DIAGNOSIS — Z48.810 AFTERCARE FOLLOWING SURGERY OF A SENSORY ORGAN: ICD-10-CM

## 2019-02-27 PROCEDURE — G0463 HOSPITAL OUTPT CLINIC VISIT: HCPCS | Mod: ZF

## 2019-02-27 PROCEDURE — G0463 HOSPITAL OUTPT CLINIC VISIT: HCPCS | Mod: 27

## 2019-02-27 PROCEDURE — 76512 OPH US DX B-SCAN: CPT | Mod: ZF | Performed by: OPHTHALMOLOGY

## 2019-02-27 PROCEDURE — 40000269 ZZH STATISTIC NO CHARGE FACILITY FEE: Mod: ZF

## 2019-02-27 RX ORDER — FLUOROMETHOLONE 0.1 %
1 SUSPENSION, DROPS(FINAL DOSAGE FORM)(ML) OPHTHALMIC (EYE) 2 TIMES DAILY
Qty: 5 ML | Refills: 0 | Status: SHIPPED | OUTPATIENT
Start: 2019-02-27 | End: 2019-04-15

## 2019-02-27 ASSESSMENT — REFRACTION_WEARINGRX
OD_CYLINDER: SPHERE
OS_CYLINDER: SPHERE
OD_SPHERE: -3.00
OS_SPHERE: -3.25
SPECS_TYPE: SVL
OD_CYLINDER: SPHERE
OD_SPHERE: -3.00
OS_CYLINDER: SPHERE
OS_SPHERE: -3.25
SPECS_TYPE: SVL

## 2019-02-27 ASSESSMENT — TONOMETRY
OD_IOP_MMHG: 06
IOP_METHOD: ICARE
OS_IOP_MMHG: 14
OS_IOP_MMHG: 14
IOP_METHOD: ICARE
OD_IOP_MMHG: 06

## 2019-02-27 ASSESSMENT — VISUAL ACUITY
METHOD: SNELLEN - LINEAR
METHOD: SNELLEN - LINEAR
OS_CC: 20/15
CORRECTION_TYPE: GLASSES
OD_SC: CF @ 2'
OD_SC: CF @ 2'
CORRECTION_TYPE: GLASSES
OS_CC: 20/15

## 2019-02-27 ASSESSMENT — SLIT LAMP EXAM - LIDS
COMMENTS: NORMAL

## 2019-02-27 ASSESSMENT — CONF VISUAL FIELD
OS_NORMAL: 1
OD_NORMAL: 1
METHOD: COUNTING FINGERS
OS_NORMAL: 1
OD_NORMAL: 1
METHOD: COUNTING FINGERS

## 2019-02-27 ASSESSMENT — CUP TO DISC RATIO
OD_RATIO: 0.2
OD_RATIO: 0.2

## 2019-02-27 ASSESSMENT — EXTERNAL EXAM - RIGHT EYE
OD_EXAM: NORMAL
OD_EXAM: NORMAL

## 2019-02-27 ASSESSMENT — EXTERNAL EXAM - LEFT EYE
OS_EXAM: NORMAL
OS_EXAM: NORMAL

## 2019-02-27 NOTE — PROGRESS NOTES
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 Seen in retina without evidence of SO migration and good IOP  IOP issues this past week in [place] with IOPs in 60s and was considering emergent Ahmed tube surgery 18 but after discussion decided to hold off due to concerns for significant post-op complications; advised patient to start Diamox 500 mg TID, continue cosopt and xalatan, add alphagan. Had repeat LPI due to non-patent PI 2018.    Referred by Dr. Walsh. Continues to have redness, pain, and discharge, significant discomfort with proKera ring.    PAST OCULAR SURGERY  PPV/PPL/IOFB removal/OGR/ABx OD 18  ()  PPV/Vit Bx/ABx OD 18 ()  PPV/SBP/MS/retinectomy/SO 1000/IVT vanco OD 18 ()    Imagin18   SL consistent with exam. periperal iridotomy patent (after today's laser periperal iridotomy)  optos fundus pic consistent with exam- retina attached     Optical Coherence Tomography: 19   Right eye: slightly irregularity of the retina surface; mild subfoveal irregularity of the IS/OS and Retinal pigment epithelium   Left eye: good foveal contour    optos image consistent with exam    On examination patient has recurrent non healing K epi defect despite multiple CL placements x3   K edema  JACOB: 20/150    Assessment:  Retina attached  Doing well  IOP excellent today  Plan for prokera given non healing K epi defect     Plan:  Retina detachment and endophthalmitis precautions were discussed with the patient and was asked to return if any of the those occur    Current Medications to operative eye right eye   Decrease Vigamox to BID OD   Decrease Timolol 0.5% to QAM OD    STOP dorzolamide - give low IOP and K edema  Continue Alphagan BID OD   Continue Xalatan at bedtime OD  D/C prokera - membrane dissolved, epi defect improving - Removed today  Replace BCL OD  Continue celluvisc gel drops even with BCL  Continue press n' seal at bedtime    Follow up in 1 week retina with ultrasound   Follow up with cornea same day   Might consider Silicone Oil removal     Attending Physician Attestation:  Complete documentation of historical and exam elements from today's encounter can be found in the full encounter summary report (not reduplicated in this progress note).  I personally obtained the chief complaint(s) and history of present illness.  I confirmed and edited as necessary the review of systems, past medical/surgical history, family history, social history, and examination findings as documented by others; and I examined the patient myself.  I personally reviewed the relevant tests, images, and reports as documented above.  I formulated and edited as necessary the assessment and plan and discussed the findings and management plan with the patient and family. - Alexi Mars MD    I personally spent great than 40min with the patient, of which >50% of the time was spent face to face with the patient, counseling and coordinating care with the patient. We discussed the complexity of his diagnosis, the need for further information prior to proceeding with yet another surgery, and the unknown prognosis for the patient at this time.    Alexi Mars MD

## 2019-02-27 NOTE — PROGRESS NOTES
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 Seen in retina without evidence of SO migration and good IOP  IOP issues this past week in [place] with IOPs in 60s and was considering emergent Ahmed tube surgery 18 but after discussion decided to hold off due to concerns for significant post-op complications; advised patient to start Diamox 500 mg TID, continue cosopt and xalatan, add alphagan. Had repeat LPI due to non-patent PI 2018.    Presents today with much improved pain. IOP 9 today. Tolerating diamox well (taste changes but no nausea / vomiting)    PAST OCULAR SURGERY  PPV/PPL/IOFB removal/OGR/ABx OD 18  ()  PPV/Vit Bx/ABx OD 18 ()  PPV/SBP/MS/retinectomy/SO 1000/IVT vanco OD 18 ()    Imagin18   SL consistent with exam. periperal iridotomy patent (after today's laser periperal iridotomy)  optos fundus pic consistent with exam- retina attached     Optical Coherence Tomography: 19   Right eye: slightly irregularity of the retina surface; mild subfoveal irregularity of the IS/OS and Retinal pigment epithelium   Left eye: good foveal contour    optos image consistent with exam    U/S 19 Right eye : retina appears attached under oil     On examination patient has recurrent non healing K epi defect despite multiple CL placements x3   K edema  JACOB: 20/150    Assessment:  Retina attached  Doing well  IOP excellent today  Plan for prokera given non healing K epi defect     Plan:  Retina detachment and endophthalmitis precautions were discussed with the patient and was asked to return if any of the those occur      Current Medications to operative eye right eye    Moxifloxacine to BID OD   Decrease Timolol 0.5% to QAM OD   STOP dorzolamide - give low IOP and K edema  Continue Alphagan BID OD   Continue Xalatan at bedtime OD  D/C prokera - membrane dissolved, epi defect improving - Removed today  Replace BCL OD  Continue celluvisc gel drops even with BCL  Continue press n' seal at bedtime       Follow up in 3 weeks retina with ultrasound   Follow up with cornea same day   Might consider Silicone Oil removal     ~~~~~~~~~~~~~~~~~~~~~~~~~~~~~~~~~~   Complete documentation of historical and exam elements from today's encounter can be found in the full encounter summary report (not reduplicated in this progress note).  I personally obtained the chief complaint(s) and history of present illness.  I confirmed and edited as necessary the review of systems, past medical/surgical history, family history, social history, and examination findings as documented by others; and I examined the patient myself.  I personally reviewed the relevant tests, images, and reports as documented above.  I personally reviewed the ophthalmic test(s) associated with this encounter, agree with the interpretation(s) as documented by the resident/fellow, and have edited the corresponding report(s) as necessary.   I formulated and edited as necessary the assessment and plan and discussed the findings and management plan with the patient and family and I was present for the entire procedure performed by the resident/fellow.    Tamera Walsh MD   of Ophthalmology.  Retina Service   Department of Ophthalmology and Visual Neurosciences   [facility]  Phone: [phone]   Fax: [phone]

## 2019-02-27 NOTE — PATIENT INSTRUCTIONS
Decrease Moxifloxacin (TAN Top) to twice a day in the right eye    Decrease Timolol 0.5% (YELLOW TOP) to every morning in the right eye    STOP dorzolamide (ORANGE TOP)     Continue Alphagan (PURPLE TOP) twice a day in the right eye     Continue Xalatan (CARISA TOP) at bedtime in the right eye    FML (new eye drop) twice a day in the right eye    Continue Celluvisc gel

## 2019-02-27 NOTE — NURSING NOTE
Chief Complaints and History of Present Illnesses   Patient presents with     Follow Up     Chief Complaint(s) and History of Present Illness(es)     Follow Up     Laterality: both eyes    Onset: gradual    Onset: weeks ago    Frequency: constantly    Pain scale: 0/10              Comments     1 wk RE recheck (s/p OGR 11/2/18, traumatic endophthalmitis s/p SB/PPV/MP/retinectomy/intravit vanc)    Vision is stable/worse with Prokera ctl. C/o some discomfort bc of the Prokera lens fit.   No additional comments or concerns.    Ocular meds: vigamox qid OD, Xalatan OD QHS, Timolol bid OD, Dorzolamide bid OD, Alphagan bid OD, AT PRN    Natalya Tracy COT 11:38 AM February 27, 2019

## 2019-02-27 NOTE — NURSING NOTE
Chief Complaints and History of Present Illnesses   Patient presents with     Post Op (Ophthalmology) Right Eye     Chief Complaint(s) and History of Present Illness(es)     Post Op (Ophthalmology) Right Eye     Laterality: right eye    Onset: gradual    Onset: weeks ago    Course: stable    Associated symptoms: eye pain and redness.  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 C/o some discomfort bc of the Prokera lens fit.   No additional comments or concerns.    Ocular meds: vigamox qid OD, Xalatan OD QHS, Timolol bid OD, Dorzolamide bid OD, Alphagan bid OD, AT PRN    Natalya Tracy COT 11:38 AM February 27, 2019

## 2019-03-18 DIAGNOSIS — H33.051 OLD TOTAL RETINAL DETACHMENT OF RIGHT EYE: Primary | ICD-10-CM

## 2019-03-19 ENCOUNTER — OFFICE VISIT (OUTPATIENT)
Dept: OPHTHALMOLOGY | Facility: CLINIC | Age: 39
End: 2019-03-19
Attending: OPHTHALMOLOGY
Payer: COMMERCIAL

## 2019-03-19 ENCOUNTER — TELEPHONE (OUTPATIENT)
Dept: OPHTHALMOLOGY | Facility: CLINIC | Age: 39
End: 2019-03-19

## 2019-03-19 DIAGNOSIS — H40.053 INCREASED INTRAOCULAR PRESSURE, BILATERAL: ICD-10-CM

## 2019-03-19 DIAGNOSIS — H33.051 OLD TOTAL RETINAL DETACHMENT OF RIGHT EYE: ICD-10-CM

## 2019-03-19 PROCEDURE — G0463 HOSPITAL OUTPT CLINIC VISIT: HCPCS | Mod: ZF

## 2019-03-19 RX ORDER — BRIMONIDINE TARTRATE 2 MG/ML
1 SOLUTION/ DROPS OPHTHALMIC 2 TIMES DAILY
Qty: 5 ML | Refills: 3 | Status: SHIPPED | OUTPATIENT
Start: 2019-03-19 | End: 2019-05-22

## 2019-03-19 ASSESSMENT — TONOMETRY
OD_IOP_MMHG: 08
IOP_METHOD: ICARE
OS_IOP_MMHG: 10

## 2019-03-19 ASSESSMENT — REFRACTION_WEARINGRX
OS_CYLINDER: SPHERE
OS_SPHERE: -3.25
SPECS_TYPE: SVL
OD_SPHERE: -3.00
OD_CYLINDER: SPHERE

## 2019-03-19 ASSESSMENT — EXTERNAL EXAM - LEFT EYE: OS_EXAM: NORMAL

## 2019-03-19 ASSESSMENT — SLIT LAMP EXAM - LIDS: COMMENTS: NORMAL

## 2019-03-19 ASSESSMENT — CONF VISUAL FIELD
METHOD: COUNTING FINGERS
OD_NORMAL: 1
OS_NORMAL: 1

## 2019-03-19 ASSESSMENT — VISUAL ACUITY
OS_CC: 20/15
METHOD: SNELLEN - LINEAR
OD_SC: 7/200 E
CORRECTION_TYPE: GLASSES

## 2019-03-19 ASSESSMENT — CUP TO DISC RATIO: OD_RATIO: 0.2

## 2019-03-19 ASSESSMENT — EXTERNAL EXAM - RIGHT EYE: OD_EXAM: NORMAL

## 2019-03-19 NOTE — PROGRESS NOTES
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 Seen in retina without evidence of SO migration and good IOP  IOP issues this past week in [place] with IOPs in 60s and was considering emergent Ahmed tube surgery 12/11/18 but after discussion decided to hold off due to concerns for significant post-op complications; advised patient to start Diamox 500 mg TID, continue cosopt and xalatan, add alphagan. Had repeat LPI due to non-patent PI 12/2018.    Interval history:   Presents today with pain, swelling and redness in right eye since 3-15-19. Had a BCL in right eye and removed that this am.  Did not note any improvement. Does think irritation may have started when he was in the pool with his kids - did not submerge his head in water. Eyelids are more inflamed for the first time. Denies sick contacts or other viral symptoms.    PAST OCULAR SURGERY  PPV/PPL/IOFB removal/OGR/ABx OD 11/2/18  ()  PPV/Vit Bx/ABx OD 11/4/18 ()  PPV/SBP/MS/retinectomy/SO 1000/IVT vanco OD 11/20/18 ()    Imaging:   U/S 3/19/19  Right eye : difficult assessment due to silicone oil unable to visualize vitreous    SL consistent with exam. periperal iridotomy patent (after today's laser periperal iridotomy)  12/13/18 optos fundus pic consistent with exam- retina attached     Optical Coherence Tomography: 02/21/19   Right eye: slightly irregularity of the retina surface; mild subfoveal irregularity of the IS/OS and Retinal pigment epithelium   Left eye: good foveal contour    On examination patient has had recurrent non healing K epi defect despite multiple CL placements x3   K edema  JACOB: 20/150    Assessment:  Retina attached  Epi is  intact with mild irregulairty. Increasing central corneal haze.  2+ conjunctival injection with mild eyelid edema and erythema. No papillae or follicles, no scleral nodules.    Plan:  Retina detachment and endophthalmitis precautions were discussed with the patient and was asked to return if any of the those occur    Medications to operative - right eye     Increase FML four times a day right eye  Discontinue Moxifloxacin to BID OD   Continue Timolol 0.5% to QAM OD   (Off of dorzolamide - give low IOP and K edema)  Continue Alphagan BID OD   Continue Xalatan at bedtime OD  Observe without BCL today  Continue celluvisc gel drops, Q1h Preservative free ATs  Continue press n' seal at bedtime       Follow up in 1 week ekta, retina   Might consider Silicone Oil removal     Diana Hernandez MD  PGY-3 Ophthalmology    Complete documentation of historical and exam elements from today's encounter can be found in the full encounter summary report (not reduplicated in this progress note). I personally obtained the chief complaint(s) and history of present illness.  I confirmed and edited as necessary the review of systems, past medical/surgical history, family history, social history, and examination findings as documented by others; and I examined the patient myself. I personally reviewed the relevant tests, images, and reports as documented above. I formulated and edited as necessary the assessment and plan and discussed the findings and management plan with the patient and family.     Nancy Jean MD

## 2019-03-19 NOTE — TELEPHONE ENCOUNTER
Pt at pharmacy renewing Rx   Right eye swelling upper lid-- starting yesterday and worse today  Right eye pain-- 6-7.  Generalized around eye  New redness on white part of eye-- started yesterday  More sensitive to light today-- more photophobic    Vision not as good with the light sensitivity    Offered exam today and pt accepts    Max Patel RN 8:31 AM 03/19/19

## 2019-03-19 NOTE — NURSING NOTE
Chief Complaints and History of Present Illnesses   Patient presents with     Follow Up     Pain and swelling RE     Chief Complaint(s) and History of Present Illness(es)     Follow Up     Associated symptoms: Negative for flashes, floaters, redness and dryness    Comments: Pain and swelling RE              Comments     Swelling, redness and eye pain in RE since yesterday. Pt notes RE very hard to open.  Pt also notes that he had a BCL in RE since last visit, but took it out this morning.    Samson GARCIA March 19, 2019 12:59 PM

## 2019-03-21 ENCOUNTER — TELEPHONE (OUTPATIENT)
Dept: OPHTHALMOLOGY | Facility: CLINIC | Age: 39
End: 2019-03-21

## 2019-03-21 NOTE — TELEPHONE ENCOUNTER
Called and spoke with pharmacy and with patient. He should be on FML drops - 1 drop QID right eye, as of his last visit 3/19/19 with Dr. Kohli and Dr. Jean.     Pharmacy is aware of the updated instructions and will refill this medication for him today.    Patient is aware of the updated instructions.    Hallie iPchardo MD  PGY-5, Cornea Fellow  Ophthalmology

## 2019-03-25 DIAGNOSIS — H33.051 RECENT RETINAL DETACHMENT OF RIGHT EYE, TOTAL/SUBTOTAL: Primary | ICD-10-CM

## 2019-03-27 ENCOUNTER — OFFICE VISIT (OUTPATIENT)
Dept: OPHTHALMOLOGY | Facility: CLINIC | Age: 39
End: 2019-03-27
Attending: OPHTHALMOLOGY
Payer: COMMERCIAL

## 2019-03-27 DIAGNOSIS — H17.9 CORNEAL SCAR AND OPACITY: ICD-10-CM

## 2019-03-27 DIAGNOSIS — Z48.810 AFTERCARE FOLLOWING SURGERY OF A SENSORY ORGAN: Primary | ICD-10-CM

## 2019-03-27 DIAGNOSIS — H27.01 APHAKIA OF RIGHT EYE: ICD-10-CM

## 2019-03-27 DIAGNOSIS — H33.051 OLD TOTAL RETINAL DETACHMENT OF RIGHT EYE: ICD-10-CM

## 2019-03-27 DIAGNOSIS — S05.31XD: ICD-10-CM

## 2019-03-27 DIAGNOSIS — H17.9 RIGHT CORNEAL SCAR: Primary | ICD-10-CM

## 2019-03-27 PROCEDURE — 92025 CPTRIZED CORNEAL TOPOGRAPHY: CPT | Mod: ZF | Performed by: OPHTHALMOLOGY

## 2019-03-27 PROCEDURE — 40000269 ZZH STATISTIC NO CHARGE FACILITY FEE: Mod: ZF

## 2019-03-27 PROCEDURE — G0463 HOSPITAL OUTPT CLINIC VISIT: HCPCS | Mod: ZF

## 2019-03-27 RX ORDER — FLUOROMETHOLONE 0.1 %
1 SUSPENSION, DROPS(FINAL DOSAGE FORM)(ML) OPHTHALMIC (EYE) 4 TIMES DAILY
COMMUNITY
End: 2019-04-24

## 2019-03-27 ASSESSMENT — REFRACTION_WEARINGRX
OD_CYLINDER: SPHERE
OS_SPHERE: -3.25
OS_CYLINDER: SPHERE
OD_SPHERE: -3.00
SPECS_TYPE: SVL

## 2019-03-27 ASSESSMENT — TONOMETRY
IOP_METHOD: TONOPEN
IOP_METHOD: TONOPEN
OS_IOP_MMHG: 13
OS_IOP_MMHG: 13
OD_IOP_MMHG: 12
OD_IOP_MMHG: 12

## 2019-03-27 ASSESSMENT — CONF VISUAL FIELD
OD_NORMAL: 1
OS_NORMAL: 1
OS_NORMAL: 1

## 2019-03-27 ASSESSMENT — VISUAL ACUITY
METHOD: SNELLEN - LINEAR
METHOD: SNELLEN - LINEAR
OS_CC: 20/15
OD_CC: 20/500
OS_CC: 20/15
CORRECTION_TYPE: GLASSES
OD_CC: 20/500
CORRECTION_TYPE: GLASSES

## 2019-03-27 ASSESSMENT — EXTERNAL EXAM - RIGHT EYE
OD_EXAM: NORMAL
OD_EXAM: NORMAL

## 2019-03-27 ASSESSMENT — SLIT LAMP EXAM - LIDS
COMMENTS: NORMAL
COMMENTS: NORMAL

## 2019-03-27 ASSESSMENT — EXTERNAL EXAM - LEFT EYE
OS_EXAM: NORMAL
OS_EXAM: NORMAL

## 2019-03-27 ASSESSMENT — CUP TO DISC RATIO
OD_RATIO: 0.2
OD_RATIO: 0.2

## 2019-03-27 ASSESSMENT — REFRACTION_MANIFEST: OD_SPHERE: +12.00

## 2019-03-27 NOTE — PROGRESS NOTES
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 Seen in retina without evidence of SO migration and good IOP  IOP issues this past week in [place] with IOPs in 60s and was considering emergent Ahmed tube surgery 18 but after discussion decided to hold off due to concerns for significant post-op complications; advised patient to start Diamox 500 mg TID, continue cosopt and xalatan, add alphagan. Had repeat LPI due to non-patent PI 2018.    Interval: Here for 1 week follow up with Dr. Mars. Told to increase FML to QID right eye since 3/19/19. Feeling much better. Eye is white with no discharge or irritation. Patient states vision is poor. Photophobia. No pain.    PAST OCULAR SURGERY:  PPV/PPL/IOFB removal/OGR/ABx OD 18  ()  PPV/Vit Bx/ABx OD 18 ()  PPV/SBP/MS/retinectomy/SO 1000/IVT vanco OD 18 ()    Imagin18   SL consistent with exam. periperal iridotomy patent (after today's laser periperal iridotomy)  optos fundus pic consistent with exam- retina attached     Optical Coherence Tomography: 19   Right eye: slightly irregularity of the retina surface; mild subfoveal irregularity of the IS/OS and Retinal pigment epithelium   Left eye: good foveal contour    optos image consistent with exam    On examination patient has recurrent non healing K epi defect despite multiple CL placements x3   K edema  JACOB: 20/150    Gtts:  FML QID right eye  Alphagan BID right eye  xalatan at bedtime right eye  Timolol qAM right eye   Lubricating drops - Refresh gel frequently   Press N' Seal at night    A/P:   1. PPV/SBP/MS/retinectomy/SO 1000/IVT vanco OD 18 ()  -epi in tact today  - cornea overall looks great with only few PEE  -vision improves to 20/400 with +12.0 D lens    -one loose suture removed today at slit lamp (nasally) - see below  - K ora with ATR astigmatism - removal nasal tight sutures.  -use moxi QID right eye x 4 days, then stop  -continue FML QID right eye  -continue aggressive tears, Press N Seal qhs    2. Ocular hypertension, right eye  -IOP 12 today on timolol qAM, brimonidine BID and latanoprost at bedtime  -continue as above    3. Aphakia, right eye  -vision 20/400 today with +12.0 lens  -prior JACOB was 20/150  -consider secondary IOL insertion  -needs IOL calcs still    F/u Retina today - possible SO removal? Possible secondary IOL insertion at same time?     Hallie Pichardo MD  PGY-5, Cornea Fellow  Ophthalmology    Suture removal at slit lamp:  -right eye - nylon 10-0 loose, nasallly  -placed topical proparacaine  -cut suture with 27G needle on TB syringe  -removed suture with forceps  -no complications  -applied topical ofloxacin post-procedure  -start ofloxacin QID x 4 days, then stop    Attending Physician Attestation:  Complete documentation of historical and exam elements from today's encounter can be found in the full encounter summary report (not reduplicated in this progress note).  I personally obtained the chief complaint(s) and history of present illness.  I confirmed and edited as necessary the review of systems, past medical/surgical history, family history, social history, and examination findings as documented by others; and I examined the patient myself.  I personally reviewed the relevant tests, images, and reports as documented above.  I formulated and edited as necessary the assessment and plan and discussed the findings and management plan with the patient and family. I personally reviewed the ophthalmic test(s) associated with this encounter, agree with the interpretation(s) as documented by the resident/fellow, and have edited the corresponding  report(s) as necessary. - Alexi Mars MD    I personally spent great than 40min with the patient, of which >50% of the time was spent face to face with the patient, counseling and coordinating care with the patient. We discussed the complexity of his diagnosis, the need for further information prior to proceeding with yet another surgery, and the unknown prognosis for the patient at this time. Discussed and coordinated care with retina.    Alexi Mars MD

## 2019-03-27 NOTE — PROGRESS NOTES
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 Seen in retina without evidence of SO migration and good IOP  IOP issues this past week in [place] with IOPs in 60s and was considering emergent Ahmed tube surgery 18 but after discussion decided to hold off due to concerns for significant post-op complications; advised patient to start Diamox 500 mg TID, continue cosopt and xalatan, add alphagan. Had repeat LPI due to non-patent PI 2018.    Interval: Here for 1 week follow up with Dr. Mars. Told to increase FML to QID right eye since 3/19/19. Feeling much better. Eye is white with no discharge or irritation.     PAST OCULAR SURGERY:  PPV/PPL/IOFB removal/OGR/ABx OD 18  ()  PPV/Vit Bx/ABx OD 18 ()  PPV/SBP/MS/retinectomy/SO 1000/IVT vanco OD 18 ()    Imagin18   SL consistent with exam. periperal iridotomy patent (after today's laser periperal iridotomy)  optos fundus pic consistent with exam- retina attached     Optical Coherence Tomography: 19   Right eye: slightly irregularity of the retina surface; mild subfoveal irregularity of the IS/OS and Retinal pigment epithelium   Left eye: good foveal contour    optos image consistent with exam    On examination patient has recurrent non healing K epi defect despite multiple CL placements x3   K edema  JACOB: 20/150    Gtts:  FML QID right eye  Alphagan BID right eye  xalatan at bedtime right eye  Timolol qAM right eye   Lubricating drops - Refresh gel frequently   Press N' Seal at night    A/P:   1. PPV/SBP/MS/retinectomy/SO 1000/IVT vanco OD 18 ()  -epi in tact today - cornea overall looks great with only few  PEE  -vision improves to 20/400 with +12.0 D lens    - sutures removed today at slit lamp by cornea  -use moxi QID right eye x 4 days, then stop  -continue FML QID right eye  -continue aggressive tears, Press N Seal qhs    2. Ocular hypertension, right eye  -IOP 12 today on timolol qAM, brimonidine BID and latanoprost at bedtime  -continue as above    3. Aphakia, right eye  -vision 20/400 today with +12.0 lens  -prior JACOB was 20/150  -consider secondary IOL insertion  -needs IOL calcs still    plan  Follow up in 4 weeks with retina and cornea- Dr. Mars   Optical Coherence Tomography , intraocular lens calculations and JACOB  Plan for 25 g Pars plana vitrectomy (PPV)/ SO removal/. Possible combo secondary IOL   Stop the latanoprost    FML QID right eye  Alphagan BID right eye  Timolol qAM right eye   Lubricating drops - Refresh gel frequently       ~~~~~~~~~~~~~~~~~~~~~~~~~~~~~~~~~~   Complete documentation of historical and exam elements from today's encounter can be found in the full encounter summary report (not reduplicated in this progress note).  I personally obtained the chief complaint(s) and history of present illness.  I confirmed and edited as necessary the review of systems, past medical/surgical history, family history, social history, and examination findings as documented by others; and I examined the patient myself.  I personally reviewed the relevant tests, images, and reports as documented above.  I formulated and edited as necessary the assessment and plan and discussed the findings and management plan with the patient and family    Tamera Walsh MD   of Ophthalmology.  Retina Service   Department of Ophthalmology and Visual Neurosciences   [facility]  Phone: [phone]   Fax: [phone]

## 2019-03-27 NOTE — NURSING NOTE
Chief Complaints and History of Present Illnesses   Patient presents with     Follow Up     Chief Complaint(s) and History of Present Illness(es)     Follow up for PPV/PPL/IOFB removal/OGR/ABx OD 11/2/18  (SM).    The patient denies eye pain.  EROS Pal 10:41 AM 03/27/2019

## 2019-03-27 NOTE — NURSING NOTE
Chief Complaints and History of Present Illnesses   Patient presents with     Follow Up     Chief Complaint(s) and History of Present Illness(es)     Follow up for PPV/PPL/IOFB removal/OGR/ABx OD 11/2/18  (SM) and epi defect.    The patient denies eye pain.  EROS Pal 10:41 AM 03/27/2019

## 2019-04-24 ENCOUNTER — OFFICE VISIT (OUTPATIENT)
Dept: OPHTHALMOLOGY | Facility: CLINIC | Age: 39
End: 2019-04-24
Attending: OPHTHALMOLOGY
Payer: COMMERCIAL

## 2019-04-24 DIAGNOSIS — H35.371 EPIRETINAL MEMBRANE (ERM) OF RIGHT EYE: Primary | ICD-10-CM

## 2019-04-24 DIAGNOSIS — S05.31XD: Primary | ICD-10-CM

## 2019-04-24 DIAGNOSIS — Z48.810 AFTERCARE FOLLOWING SURGERY OF A SENSORY ORGAN: ICD-10-CM

## 2019-04-24 DIAGNOSIS — H18.20 CORNEAL EDEMA OF RIGHT EYE: ICD-10-CM

## 2019-04-24 DIAGNOSIS — S05.31XS RUPTURED GLOBE OF RIGHT EYE, SEQUELA: Primary | ICD-10-CM

## 2019-04-24 PROCEDURE — 40000269 ZZH STATISTIC NO CHARGE FACILITY FEE: Mod: ZF

## 2019-04-24 PROCEDURE — 76514 ECHO EXAM OF EYE THICKNESS: CPT | Mod: ZF | Performed by: OPHTHALMOLOGY

## 2019-04-24 PROCEDURE — 92134 CPTRZ OPH DX IMG PST SGM RTA: CPT | Mod: ZF | Performed by: OPHTHALMOLOGY

## 2019-04-24 PROCEDURE — 92025 CPTRIZED CORNEAL TOPOGRAPHY: CPT | Mod: ZF | Performed by: OPHTHALMOLOGY

## 2019-04-24 PROCEDURE — G0463 HOSPITAL OUTPT CLINIC VISIT: HCPCS | Mod: ZF

## 2019-04-24 RX ORDER — OFLOXACIN 3 MG/ML
1 SOLUTION/ DROPS OPHTHALMIC 4 TIMES DAILY
Qty: 1 BOTTLE | Refills: 3 | Status: SHIPPED | OUTPATIENT
Start: 2019-04-24 | End: 2019-07-17

## 2019-04-24 ASSESSMENT — VISUAL ACUITY
OD_CC: 20/400
METHOD: SNELLEN - LINEAR
OD_PH_CC: 20/125
OD_PH_CC+: -1
OD_PH_CC: 20/125
OD_PH_CC+: -1
OS_CC: 20/15
OS_CC: 20/15
OD_CC: 20/400
METHOD: SNELLEN - LINEAR

## 2019-04-24 ASSESSMENT — REFRACTION_WEARINGRX
OD_SPHERE: -3.00
SPECS_TYPE: SVL
OS_CYLINDER: SPHERE
OS_SPHERE: -3.25
OD_CYLINDER: SPHERE

## 2019-04-24 ASSESSMENT — CONF VISUAL FIELD
OS_NORMAL: 1
METHOD: COUNTING FINGERS
OS_NORMAL: 1
OD_NORMAL: 1
METHOD: COUNTING FINGERS

## 2019-04-24 ASSESSMENT — PACHYMETRY
OD_CT(UM): 527
OS_CT(UM): 534

## 2019-04-24 ASSESSMENT — TONOMETRY
OD_IOP_MMHG: 15
IOP_METHOD: TONOPEN
OS_IOP_MMHG: 12
OD_IOP_MMHG: 15
OS_IOP_MMHG: 12
IOP_METHOD: TONOPEN

## 2019-04-24 ASSESSMENT — EXTERNAL EXAM - RIGHT EYE
OD_EXAM: NORMAL
OD_EXAM: NORMAL

## 2019-04-24 ASSESSMENT — SLIT LAMP EXAM - LIDS
COMMENTS: NORMAL
COMMENTS: NORMAL

## 2019-04-24 ASSESSMENT — EXTERNAL EXAM - LEFT EYE
OS_EXAM: NORMAL
OS_EXAM: NORMAL

## 2019-04-24 ASSESSMENT — CUP TO DISC RATIO: OD_RATIO: 0.2

## 2019-04-24 NOTE — NURSING NOTE
Chief Complaints and History of Present Illnesses   Patient presents with     Post Op (Ophthalmology) Right Eye     Chief Complaint(s) and History of Present Illness(es)     Post Op (Ophthalmology) Right Eye     Laterality: right eye    Onset: 1 month ago              Comments     Follow up for, Rupture of globe of right eye following blunt trauma. He says RE feels good and he feels he sees better than at last visit. Also seeing Dr Walsh  today as well.     Saeed Busby COT 8:40 AM April 24, 2019

## 2019-04-24 NOTE — PROGRESS NOTES
CC: status post scleral buckle 41/70; 25 g Pars plana vitrectomy (PPV); membrane peel, retinectomy, vancomycin intravitreal injection 11/20/18. 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 Seen in retina without evidence of SO migration and good IOP  IOP issues this past week in [place] with IOPs in 60s and was considering emergent Ahmed tube surgery 12/11/18 but after discussion decided to hold off due to concerns for significant post-op complications; advised patient to start Diamox 500 mg TID, continue cosopt and xalatan, add alphagan. Had repeat LPI due to non-patent PI 12/2018.    Interval: Here for 1 week follow up with Dr. Mars. Told to increase FML to QID right eye since 3/19/19. Feeling much better. Eye is white with no discharge or irritation.     PAST OCULAR SURGERY:  PPV/PPL/IOFB removal/OGR/ABx OD 11/2/18  ()  PPV/Vit Bx/ABx OD 11/4/18 ()  PPV/SBP/MS/retinectomy/SO 1000/IVT vanco OD 11/20/18 ()    Optical Coherence Tomography: 02/21/19   Right eye: slight irregularity of the retina surface; mild subfoveal irregularity of the IS/OS and Retinal pigment epithelium   Left eye: good foveal contour    optos image consistent with exam  JACOB: 20/150    Gtts:  FML QID right eye  Alphagan BID right eye  xalatan at bedtime right eye  Timolol qAM right eye   Lubricating drops - Refresh gel frequently   Press N' Seal at night    A/P:   1. PPV/SBP/MS/retinectomy/SO 1000/IVT vanco OD 11/20/18 ()  -epi intact today - cornea overall looks great  - K sutures in place  -use moxi QID right eye x 4 days, then stop  -continue FML QID right eye  -continue aggressive tears, Press N Seal qhs    2. Ocular hypertension, right eye  -on timolol qAM, brimonidine twice a day  - consider stopping timolol    3. Aphakia, right eye  -vision 20/400 today with +12.0 lens  -prior JACOB  was 20/150  -consider secondary IOL insertion  -needs IOL calcs     plan  Follow up in 6 weeks with retina    Optical Coherence Tomography , intraocular lens calculations and JACOB  Plan for 25 g Pars plana vitrectomy (PPV)/ SO removal/. Possible combo secondary IOL     FML QID right eye  Alphagan BID right eye  Lubricating drops - Refresh gel frequently   ~~~~~~~~~~~~~~~~~~~~~~~~~~~~~~~~~~   Complete documentation of historical and exam elements from today's encounter can be found in the full encounter summary report (not reduplicated in this progress note).  I personally obtained the chief complaint(s) and history of present illness.  I confirmed and edited as necessary the review of systems, past medical/surgical history, family history, social history, and examination findings as documented by others; and I examined the patient myself.  I personally reviewed the relevant tests, images, and reports as documented above.  I formulated and edited as necessary the assessment and plan and discussed the findings and management plan with the patient and family    Tamera Walsh MD   of Ophthalmology.  Retina Service   Department of Ophthalmology and Visual Neurosciences   [facility]  Phone: [phone]   Fax: [phone]

## 2019-04-24 NOTE — PROGRESS NOTES
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 Seen in retina without evidence of SO migration and good IOP  IOP issues this past week in [place] [employer] with IOPs in 60s and was considering emergent Ahmed tube surgery 12/11/18 but after discussion decided to hold off due to concerns for significant post-op complications; advised patient to start Diamox 500 mg TID, continue cosopt and xalatan, add alphagan. Had repeat LPI due to non-patent PI 12/2018.    Interval: Also here to see Dr. Walsh. States right eye feels good and doing better overall. Patient denies pain, redness, tearing, new flashes, floaters, diplopia.     PAST OCULAR SURGERY:  PPV/PPL/IOFB removal/OGR/ABx OD 11/2/18  ()  PPV/Vit Bx/ABx OD 11/4/18 ()  PPV/SBP/MS/retinectomy/SO 1000/IVT vanco OD 11/20/18 ()    JACOB: 20/150    Gtts:  FML QID right eye  Alphagan BID right eye  xalatan at bedtime right eye  Timolol qAM right eye   Lubricating drops - Refresh gel frequently   Press N' Seal at night    A/P:   1. PPV/SBP/MS/retinectomy/SO 1000/IVT vanco OD 11/20/18 ()  -epi intact today - cornea overall looks great with only few PEE  -vision improves to 20/400 with +12.0 D lens previously, today 20/400 ph 20/125- today    -irregular astigmatism 2/2 corneal laceration/sutures  -remove all remaining sutures today  -use moxi QID right eye x 4 days, then stop  -continue FML QID right eye  -continue celluvisc q2h OD  -continue aggressive tears, Press N Seal at bedtime  -plan for SO removal in future per retina, can combine with Yamane, perhaps around July 2019  -no evidence of K edema on pachy today - okay to monitor with aphakia/SO    2. Ocular  hypertension, right eye  -IOP 15 today on timolol qAM, brimonidine BID and latanoprost at bedtime  -stop timolol, continue brimonidine and latanoprost    3. Aphakia, right eye  -vision 20/400 today with +12.0 lens  -prior JACOB was 20/150  -consider secondary IOL insertion  -needs IOL calcs still      Nancy Jean MD  PGY-5, Cornea Fellow    Attending Physician Attestation:  Complete documentation of historical and exam elements from today's encounter can be found in the full encounter summary report (not reduplicated in this progress note).  I personally obtained the chief complaint(s) and history of present illness.  I confirmed and edited as necessary the review of systems, past medical/surgical history, family history, social history, and examination findings as documented by others; and I examined the patient myself.  I personally reviewed the relevant tests, images, and reports as documented above.  I formulated and edited as necessary the assessment and plan and discussed the findings and management plan with the patient and family. I personally reviewed the ophthalmic test(s) associated with this encounter, agree with the interpretation(s) as documented by the resident/fellow, and have edited the corresponding report(s) as necessary. - Alexi Mars MD    --------------------------------------------------------------------------------------------------------------------------------------------  Pachymetry - Interpretation & Report  Indication: corneal laceration / corneal sutures OD  Performed by: Alexi Mars MD  Reliability: good  Patient cooperation: good  Findings:   Right eye:  527 micrometers centrally    Left eye:  534 micrometers centrally   Interval Change, Assessment, & Impact on treatment:   Right eye:  Thin, baseline, no K edema    Left eye:  Thin, baseline, no K edema   Signed: Alexi Mars 4/24/2019 8:33 PM

## 2019-06-05 ENCOUNTER — OFFICE VISIT (OUTPATIENT)
Dept: OPHTHALMOLOGY | Facility: CLINIC | Age: 39
End: 2019-06-05
Attending: OPHTHALMOLOGY
Payer: COMMERCIAL

## 2019-06-05 DIAGNOSIS — S05.31XD RUPTURED GLOBE OF RIGHT EYE, SUBSEQUENT ENCOUNTER: Primary | ICD-10-CM

## 2019-06-05 DIAGNOSIS — H18.20 CORNEAL EDEMA: ICD-10-CM

## 2019-06-05 DIAGNOSIS — H27.01 APHAKIA OF RIGHT EYE: ICD-10-CM

## 2019-06-05 DIAGNOSIS — H35.371 EPIRETINAL MEMBRANE (ERM) OF RIGHT EYE: ICD-10-CM

## 2019-06-05 DIAGNOSIS — S05.31XD: Primary | ICD-10-CM

## 2019-06-05 DIAGNOSIS — S05.31XD: ICD-10-CM

## 2019-06-05 PROCEDURE — 76519 ECHO EXAM OF EYE: CPT | Mod: ZF | Performed by: OPHTHALMOLOGY

## 2019-06-05 PROCEDURE — 76514 ECHO EXAM OF EYE THICKNESS: CPT | Mod: ZF,RT | Performed by: OPHTHALMOLOGY

## 2019-06-05 PROCEDURE — G0463 HOSPITAL OUTPT CLINIC VISIT: HCPCS | Mod: ZF

## 2019-06-05 PROCEDURE — 40000269 ZZH STATISTIC NO CHARGE FACILITY FEE: Mod: ZF

## 2019-06-05 PROCEDURE — 92134 CPTRZ OPH DX IMG PST SGM RTA: CPT | Mod: ZF | Performed by: OPHTHALMOLOGY

## 2019-06-05 ASSESSMENT — VISUAL ACUITY
OD_SC: 20/400
METHOD: SNELLEN - LINEAR
CORRECTION_TYPE: GLASSES
OS_SC+: -2
OD_CC: 20/400
OS_CC: 20/15
OD_PH_CC: 20/150
OS_CC+: -2
OS_SC: 20/15
METHOD: SNELLEN - LINEAR
OD_PH_SC: 20/150

## 2019-06-05 ASSESSMENT — EXTERNAL EXAM - RIGHT EYE
OD_EXAM: NORMAL
OD_EXAM: NORMAL

## 2019-06-05 ASSESSMENT — REFRACTION_WEARINGRX
OD_SPHERE: -3.00
SPECS_TYPE: SVL
OS_CYLINDER: SPHERE
OD_CYLINDER: SPHERE
OS_SPHERE: -3.25

## 2019-06-05 ASSESSMENT — CONF VISUAL FIELD
OD_NORMAL: 1
OS_NORMAL: 1
OS_NORMAL: 1
METHOD: COUNTING FINGERS
OD_NORMAL: 1
METHOD: COUNTING FINGERS

## 2019-06-05 ASSESSMENT — PACHYMETRY
OD_CT(UM): 500
OD_CT(UM): 500

## 2019-06-05 ASSESSMENT — SLIT LAMP EXAM - LIDS
COMMENTS: NORMAL

## 2019-06-05 ASSESSMENT — TONOMETRY
OD_IOP_MMHG: 18
IOP_METHOD: ICARE
IOP_METHOD: ICARE
OD_IOP_MMHG: 18
OS_IOP_MMHG: 13
OS_IOP_MMHG: 13

## 2019-06-05 ASSESSMENT — EXTERNAL EXAM - LEFT EYE
OS_EXAM: NORMAL
OS_EXAM: NORMAL

## 2019-06-05 ASSESSMENT — CUP TO DISC RATIO: OD_RATIO: 0.2

## 2019-06-05 NOTE — PROGRESS NOTES
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 Seen in retina without evidence of SO migration and good IOP  IOP issues this past week in [place] with IOPs in 60s and was considering emergent Ahmed tube surgery 12/11/18 but after discussion decided to hold off due to concerns for significant post-op complications; advised patient to start Diamox 500 mg TID, continue cosopt and xalatan, add alphagan. Had repeat LPI due to non-patent PI 12/2018.    Interval: Also here to see Dr. Walsh. States vision stable, denies discomfort. Patient interested in surgery for secondary IOL. Patient has been compliant with press n'seal and aggressive lubrication. Patient still has silicone oil OD. Patient denies new flashes, floaters, diplopia. No discharge, redness, tearing.     PAST OCULAR SURGERY:  PPV/PPL/IOFB removal/OGR/ABx OD 11/2/18  ()  PPV/Vit Bx/ABx OD 11/4/18 ()  PPV/SBP/MS/retinectomy/SO 1000/IVT vanco OD 11/20/18 ()    JACOB: 20/150    Gtts:  FML QID right eye  Alphagan BID right eye      Lubricating drops - Refresh gel frequently   Press N' Seal at night    A/P:   1. PPV/SBP/MS/retinectomy/SO 1000/IVT vanco OD 11/20/18 ()  -epi intact today - cornea overall looks great with only few PEE  -vision improves to 20/400 with +12.0 D lens previously, today 20/400 ph 20/150    -irregular astigmatism 2/2 corneal laceration/sutures  -removed all remaining sutures last visit  -continue FML QID right eye  -continue celluvisc q2h OD  -continue aggressive tears, Press N Seal at bedtime  -plan for SO removal in future per retina, can combine with Yamane, perhaps around July 2019  -no evidence of K edema on pachy today       2. Ocular hypertension, right eye  -IOP 18 today on brimonidine BID, continue    3. Aphakia, right eye  -vision 20/400 last visit with +12.0 lens  -prior JACOB was 20/150, repeat today  -IOL calcs obtained today    Nancy Jean MD  PGY-5, Cornea Fellow    Attending Physician Attestation:  Complete documentation of historical and exam elements from today's encounter can be found in the full encounter summary report (not reduplicated in this progress note).  I personally obtained the chief complaint(s) and history of present illness.  I confirmed and edited as necessary the review of systems, past medical/surgical history, family history, social history, and examination findings as documented by others; and I examined the patient myself.  I personally reviewed the relevant tests, images, and reports as documented above.  I formulated and edited as necessary the assessment and plan and discussed the findings and management plan with the patient and family. I personally reviewed the ophthalmic test(s) associated with this encounter, agree with the interpretation(s) as documented by the resident/fellow, and have edited the corresponding report(s) as necessary. - Alexi Mars MD

## 2019-06-05 NOTE — PROGRESS NOTES
CC: status post scleral buckle 41/70; 25 g Pars plana vitrectomy (PPV); membrane peel, retinectomy, vancomycin intravitreal injection 11/20/18. 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 Seen in retina without evidence of SO migration and good IOP  IOP issues this past week in [place] with IOPs in 60s and was considering emergent Ahmed tube surgery 12/11/18 but after discussion decided to hold off due to concerns for significant post-op complications; advised patient to start Diamox 500 mg TID, continue cosopt and xalatan, add alphagan. Had repeat LPI due to non-patent PI 12/2018.    PAST OCULAR SURGERY:  PPV/PPL/IOFB removal/OGR/ABx OD 11/2/18  ()  PPV/Vit Bx/ABx OD 11/4/18 ()  PPV/SBP/MS/retinectomy/SO 1000/IVT vanco OD 11/20/18 ()    Optical Coherence Tomography: 6-5-19   Right eye: slight irregularity of the retina surface; subfoveal irregularity of the IS/OS and Retinal pigment epithelium   Left eye: good foveal contour    optos image consistent with exam  JACOB: 20/150    IOL CALCS 6-5-19  intraocular lens calcs done right eye with Silicone Oil and aphakic settings  Right eye: AL 25.49  Left eye: AL 24.58  Difference in intraocular lens calcs could be due to SB    meds  - FML QID right eye  - brimonidine twice a day   -continue aggressive tears, Press N Seal qhs    Gtts:  FML QID right eye  Alphagan BID right eye  xalatan at bedtime right eye  Timolol qAM right eye   Lubricating drops - Refresh gel frequently   Press N' Seal at night    A/P:   1. PPV/SBP/MS/retinectomy/SO 1000/IVT vanco OD 11/20/18 ()  -epi intact today - cornea overall looks great  -continue FML QID right eye  - brimonidine twice a day   -continue aggressive tears, Press N Seal qhs    2. Ocular hypertension, right eye  -on timolol qAM, brimonidine twice a day  - consider stopping  timolol    3. Aphakia, right eye  -vision 20/400 today with +12.0 lens  -prior JACOB was 20/150  -consider secondary IOL insertion  -needs IOL calcs     plan  Plan for 25 g Pars plana vitrectomy (PPV)/ SO removal/. Possible endolaser     Will plan possible secondary intraocular lens in the future    FML QID right eye  Alphagan BID right eye  Lubricating drops - Refresh gel frequently   ~~~~~~~~~~~~~~~~~~~~~~~~~~~~~~~~~~   Complete documentation of historical and exam elements from today's encounter can be found in the full encounter summary report (not reduplicated in this progress note).  I personally obtained the chief complaint(s) and history of present illness.  I confirmed and edited as necessary the review of systems, past medical/surgical history, family history, social history, and examination findings as documented by others; and I examined the patient myself.  I personally reviewed the relevant tests, images, and reports as documented above.  I formulated and edited as necessary the assessment and plan and discussed the findings and management plan with the patient and family    Tamera Walsh MD   of Ophthalmology.  Retina Service   Department of Ophthalmology and Visual Neurosciences   [facility]  Phone: [phone]   Fax: [phone]

## 2019-06-05 NOTE — Clinical Note
Michael Leong, I discussed with patient combo vs just oil removal. I was not sure if the difference in AL could be due to scleral buckle (most likely) vs artifact from oil. So patient would prefer just the oil removal and do the secondary intraocular lens in the future. Thanks for seeing joe

## 2019-06-05 NOTE — NURSING NOTE
Chief Complaints and History of Present Illnesses   Patient presents with     Follow Up     Chief Complaint(s) and History of Present Illness(es)     Follow Up     Laterality: right eye    Associated symptoms: redness (mild in right eye at night).  Negative for dryness, eye pain and tearing    Pain scale: 0/10              Comments     status post scleral buckle 41/70; 25 g Pars plana vitrectomy (PPV); membrane peel, retinectomy, vancomycin intravitreal injection 11/20/18.   He states that his vision has seemed stable in both eyes, since his last eye exam.  Patient denies having any eye discomfort.  He uses artificial tear products about 4 times a day.  At night he continues to use the press and seal on his right eye.    Mone Rivera COT 9:51 AM June 5, 2019

## 2019-06-05 NOTE — NURSING NOTE
Chief Complaints and History of Present Illnesses   Patient presents with     Follow Up     Chief Complaint(s) and History of Present Illness(es)     Follow Up     Laterality: right eye    Associated symptoms: redness (right eye mild at night, brimonidine tends to clear the redness).  Negative for eye pain, dryness and tearing    Pain scale: 0/10              Comments     Patient returns for follow up of right eye status post scleral buckle 41/70; 25 g Pars plana vitrectomy (PPV); membrane peel, retinectomy, vancomycin intravitreal injection 11/20/18 all cornea sutures removed 4/24/19.  He states that his vision has seemed stable in both eyes, since his last eye exam.  Patient denies having any eye discomfort.  He uses artificial tear products about 4 times a day.  At night he continues to use the press and seal on his right eye.    Mone Rivera COT 9:38 AM June 5, 2019

## 2019-07-01 ENCOUNTER — TRANSFERRED RECORDS (OUTPATIENT)
Dept: HEALTH INFORMATION MANAGEMENT | Facility: CLINIC | Age: 39
End: 2019-07-01

## 2019-07-08 ENCOUNTER — ANESTHESIA EVENT (OUTPATIENT)
Dept: SURGERY | Facility: AMBULATORY SURGERY CENTER | Age: 39
End: 2019-07-08

## 2019-07-09 ENCOUNTER — ANESTHESIA (OUTPATIENT)
Dept: SURGERY | Facility: AMBULATORY SURGERY CENTER | Age: 39
End: 2019-07-09

## 2019-07-09 ENCOUNTER — HOSPITAL ENCOUNTER (OUTPATIENT)
Facility: AMBULATORY SURGERY CENTER | Age: 39
End: 2019-07-09
Attending: OPHTHALMOLOGY
Payer: COMMERCIAL

## 2019-07-09 VITALS
OXYGEN SATURATION: 98 % | BODY MASS INDEX: 38.43 KG/M2 | HEART RATE: 46 BPM | DIASTOLIC BLOOD PRESSURE: 74 MMHG | WEIGHT: 290 LBS | RESPIRATION RATE: 14 BRPM | HEIGHT: 73 IN | TEMPERATURE: 98 F | SYSTOLIC BLOOD PRESSURE: 125 MMHG

## 2019-07-09 DIAGNOSIS — Z48.810 AFTERCARE FOLLOWING SURGERY OF A SENSE ORGAN: ICD-10-CM

## 2019-07-09 DIAGNOSIS — S05.31XD: Primary | ICD-10-CM

## 2019-07-09 RX ORDER — OXYCODONE HYDROCHLORIDE 5 MG/1
5-10 TABLET ORAL EVERY 4 HOURS PRN
Status: DISCONTINUED | OUTPATIENT
Start: 2019-07-09 | End: 2019-07-10 | Stop reason: HOSPADM

## 2019-07-09 RX ORDER — NALOXONE HYDROCHLORIDE 0.4 MG/ML
.1-.4 INJECTION, SOLUTION INTRAMUSCULAR; INTRAVENOUS; SUBCUTANEOUS
Status: DISCONTINUED | OUTPATIENT
Start: 2019-07-09 | End: 2019-07-10 | Stop reason: HOSPADM

## 2019-07-09 RX ORDER — ATROPINE SULFATE 10 MG/ML
SOLUTION/ DROPS OPHTHALMIC PRN
Status: DISCONTINUED | OUTPATIENT
Start: 2019-07-09 | End: 2019-07-09 | Stop reason: HOSPADM

## 2019-07-09 RX ORDER — ACETAMINOPHEN 325 MG/1
975 TABLET ORAL ONCE
Status: COMPLETED | OUTPATIENT
Start: 2019-07-09 | End: 2019-07-09

## 2019-07-09 RX ORDER — LIDOCAINE 40 MG/G
CREAM TOPICAL
Status: DISCONTINUED | OUTPATIENT
Start: 2019-07-09 | End: 2019-07-09 | Stop reason: HOSPADM

## 2019-07-09 RX ORDER — DEXAMETHASONE SODIUM PHOSPHATE 4 MG/ML
INJECTION, SOLUTION INTRA-ARTICULAR; INTRALESIONAL; INTRAMUSCULAR; INTRAVENOUS; SOFT TISSUE PRN
Status: DISCONTINUED | OUTPATIENT
Start: 2019-07-09 | End: 2019-07-09 | Stop reason: HOSPADM

## 2019-07-09 RX ORDER — ONDANSETRON 2 MG/ML
INJECTION INTRAMUSCULAR; INTRAVENOUS PRN
Status: DISCONTINUED | OUTPATIENT
Start: 2019-07-09 | End: 2019-07-09

## 2019-07-09 RX ORDER — PHENYLEPHRINE HYDROCHLORIDE 25 MG/ML
1 SOLUTION/ DROPS OPHTHALMIC
Status: COMPLETED | OUTPATIENT
Start: 2019-07-09 | End: 2019-07-09

## 2019-07-09 RX ORDER — NEOMYCIN POLYMYXIN B SULFATES AND DEXAMETHASONE 3.5; 10000; 1 MG/ML; [USP'U]/ML; MG/ML
1 SUSPENSION/ DROPS OPHTHALMIC 4 TIMES DAILY
Qty: 5 ML | Refills: 0 | Status: SHIPPED | OUTPATIENT
Start: 2019-07-09 | End: 2019-08-21

## 2019-07-09 RX ORDER — TROPICAMIDE 10 MG/ML
1 SOLUTION/ DROPS OPHTHALMIC
Status: COMPLETED | OUTPATIENT
Start: 2019-07-09 | End: 2019-07-09

## 2019-07-09 RX ORDER — HYDROMORPHONE HYDROCHLORIDE 1 MG/ML
.3-.5 INJECTION, SOLUTION INTRAMUSCULAR; INTRAVENOUS; SUBCUTANEOUS EVERY 10 MIN PRN
Status: DISCONTINUED | OUTPATIENT
Start: 2019-07-09 | End: 2019-07-10 | Stop reason: HOSPADM

## 2019-07-09 RX ORDER — CYCLOPENTOLATE HYDROCHLORIDE 10 MG/ML
1 SOLUTION/ DROPS OPHTHALMIC
Status: COMPLETED | OUTPATIENT
Start: 2019-07-09 | End: 2019-07-09

## 2019-07-09 RX ORDER — ONDANSETRON 4 MG/1
4 TABLET, ORALLY DISINTEGRATING ORAL EVERY 30 MIN PRN
Status: DISCONTINUED | OUTPATIENT
Start: 2019-07-09 | End: 2019-07-10 | Stop reason: HOSPADM

## 2019-07-09 RX ORDER — FENTANYL CITRATE 50 UG/ML
25-50 INJECTION, SOLUTION INTRAMUSCULAR; INTRAVENOUS
Status: DISCONTINUED | OUTPATIENT
Start: 2019-07-09 | End: 2019-07-09 | Stop reason: HOSPADM

## 2019-07-09 RX ORDER — PROPARACAINE HYDROCHLORIDE 5 MG/ML
1 SOLUTION/ DROPS OPHTHALMIC ONCE
Status: COMPLETED | OUTPATIENT
Start: 2019-07-09 | End: 2019-07-09

## 2019-07-09 RX ORDER — GABAPENTIN 300 MG/1
300 CAPSULE ORAL ONCE
Status: COMPLETED | OUTPATIENT
Start: 2019-07-09 | End: 2019-07-09

## 2019-07-09 RX ORDER — SODIUM CHLORIDE, SODIUM LACTATE, POTASSIUM CHLORIDE, CALCIUM CHLORIDE 600; 310; 30; 20 MG/100ML; MG/100ML; MG/100ML; MG/100ML
500 INJECTION, SOLUTION INTRAVENOUS CONTINUOUS
Status: DISCONTINUED | OUTPATIENT
Start: 2019-07-09 | End: 2019-07-09 | Stop reason: HOSPADM

## 2019-07-09 RX ORDER — LIDOCAINE HYDROCHLORIDE 20 MG/ML
INJECTION, SOLUTION INFILTRATION; PERINEURAL PRN
Status: DISCONTINUED | OUTPATIENT
Start: 2019-07-09 | End: 2019-07-09

## 2019-07-09 RX ORDER — PROPOFOL 10 MG/ML
INJECTION, EMULSION INTRAVENOUS PRN
Status: DISCONTINUED | OUTPATIENT
Start: 2019-07-09 | End: 2019-07-09

## 2019-07-09 RX ORDER — ONDANSETRON 2 MG/ML
4 INJECTION INTRAMUSCULAR; INTRAVENOUS EVERY 30 MIN PRN
Status: DISCONTINUED | OUTPATIENT
Start: 2019-07-09 | End: 2019-07-10 | Stop reason: HOSPADM

## 2019-07-09 RX ORDER — FENTANYL CITRATE 50 UG/ML
25-50 INJECTION, SOLUTION INTRAMUSCULAR; INTRAVENOUS
Status: DISCONTINUED | OUTPATIENT
Start: 2019-07-09 | End: 2019-07-10 | Stop reason: HOSPADM

## 2019-07-09 RX ORDER — KETOROLAC TROMETHAMINE 30 MG/ML
30 INJECTION, SOLUTION INTRAMUSCULAR; INTRAVENOUS EVERY 6 HOURS PRN
Status: DISCONTINUED | OUTPATIENT
Start: 2019-07-09 | End: 2019-07-10 | Stop reason: HOSPADM

## 2019-07-09 RX ORDER — FENTANYL CITRATE 50 UG/ML
INJECTION, SOLUTION INTRAMUSCULAR; INTRAVENOUS PRN
Status: DISCONTINUED | OUTPATIENT
Start: 2019-07-09 | End: 2019-07-09

## 2019-07-09 RX ORDER — TETRACAINE HYDROCHLORIDE 5 MG/ML
SOLUTION OPHTHALMIC PRN
Status: DISCONTINUED | OUTPATIENT
Start: 2019-07-09 | End: 2019-07-09 | Stop reason: HOSPADM

## 2019-07-09 RX ORDER — DEXAMETHASONE SODIUM PHOSPHATE 4 MG/ML
4 INJECTION, SOLUTION INTRA-ARTICULAR; INTRALESIONAL; INTRAMUSCULAR; INTRAVENOUS; SOFT TISSUE EVERY 10 MIN PRN
Status: DISCONTINUED | OUTPATIENT
Start: 2019-07-09 | End: 2019-07-10 | Stop reason: HOSPADM

## 2019-07-09 RX ORDER — SODIUM CHLORIDE, SODIUM LACTATE, POTASSIUM CHLORIDE, CALCIUM CHLORIDE 600; 310; 30; 20 MG/100ML; MG/100ML; MG/100ML; MG/100ML
INJECTION, SOLUTION INTRAVENOUS CONTINUOUS
Status: DISCONTINUED | OUTPATIENT
Start: 2019-07-09 | End: 2019-07-10 | Stop reason: HOSPADM

## 2019-07-09 RX ORDER — BALANCED SALT SOLUTION 6.4; .75; .48; .3; 3.9; 1.7 MG/ML; MG/ML; MG/ML; MG/ML; MG/ML; MG/ML
SOLUTION OPHTHALMIC PRN
Status: DISCONTINUED | OUTPATIENT
Start: 2019-07-09 | End: 2019-07-09 | Stop reason: HOSPADM

## 2019-07-09 RX ORDER — MEPERIDINE HYDROCHLORIDE 25 MG/ML
12.5 INJECTION INTRAMUSCULAR; INTRAVENOUS; SUBCUTANEOUS
Status: DISCONTINUED | OUTPATIENT
Start: 2019-07-09 | End: 2019-07-10 | Stop reason: HOSPADM

## 2019-07-09 RX ORDER — ALBUTEROL SULFATE 0.83 MG/ML
2.5 SOLUTION RESPIRATORY (INHALATION) EVERY 4 HOURS PRN
Status: DISCONTINUED | OUTPATIENT
Start: 2019-07-09 | End: 2019-07-09 | Stop reason: HOSPADM

## 2019-07-09 RX ADMIN — PROPARACAINE HYDROCHLORIDE 1 DROP: 5 SOLUTION/ DROPS OPHTHALMIC at 06:46

## 2019-07-09 RX ADMIN — PHENYLEPHRINE HYDROCHLORIDE 1 DROP: 25 SOLUTION/ DROPS OPHTHALMIC at 06:53

## 2019-07-09 RX ADMIN — PROPOFOL 50 MG: 10 INJECTION, EMULSION INTRAVENOUS at 08:03

## 2019-07-09 RX ADMIN — CYCLOPENTOLATE HYDROCHLORIDE 1 DROP: 10 SOLUTION/ DROPS OPHTHALMIC at 07:00

## 2019-07-09 RX ADMIN — PHENYLEPHRINE HYDROCHLORIDE 1 DROP: 25 SOLUTION/ DROPS OPHTHALMIC at 06:59

## 2019-07-09 RX ADMIN — FENTANYL CITRATE 25 MCG: 50 INJECTION, SOLUTION INTRAMUSCULAR; INTRAVENOUS at 08:27

## 2019-07-09 RX ADMIN — FENTANYL CITRATE 25 MCG: 50 INJECTION, SOLUTION INTRAMUSCULAR; INTRAVENOUS at 08:21

## 2019-07-09 RX ADMIN — FENTANYL CITRATE 50 MCG: 50 INJECTION, SOLUTION INTRAMUSCULAR; INTRAVENOUS at 07:59

## 2019-07-09 RX ADMIN — TROPICAMIDE 1 DROP: 10 SOLUTION/ DROPS OPHTHALMIC at 07:01

## 2019-07-09 RX ADMIN — TROPICAMIDE 1 DROP: 10 SOLUTION/ DROPS OPHTHALMIC at 06:51

## 2019-07-09 RX ADMIN — TROPICAMIDE 1 DROP: 10 SOLUTION/ DROPS OPHTHALMIC at 06:56

## 2019-07-09 RX ADMIN — LIDOCAINE HYDROCHLORIDE 40 MG: 20 INJECTION, SOLUTION INFILTRATION; PERINEURAL at 08:02

## 2019-07-09 RX ADMIN — ACETAMINOPHEN 975 MG: 325 TABLET ORAL at 06:55

## 2019-07-09 RX ADMIN — ONDANSETRON 4 MG: 2 INJECTION INTRAMUSCULAR; INTRAVENOUS at 07:59

## 2019-07-09 RX ADMIN — PHENYLEPHRINE HYDROCHLORIDE 1 DROP: 25 SOLUTION/ DROPS OPHTHALMIC at 06:48

## 2019-07-09 RX ADMIN — GABAPENTIN 300 MG: 300 CAPSULE ORAL at 06:55

## 2019-07-09 RX ADMIN — SODIUM CHLORIDE, SODIUM LACTATE, POTASSIUM CHLORIDE, CALCIUM CHLORIDE 500 ML: 600; 310; 30; 20 INJECTION, SOLUTION INTRAVENOUS at 07:00

## 2019-07-09 RX ADMIN — CYCLOPENTOLATE HYDROCHLORIDE 1 DROP: 10 SOLUTION/ DROPS OPHTHALMIC at 06:55

## 2019-07-09 RX ADMIN — CYCLOPENTOLATE HYDROCHLORIDE 1 DROP: 10 SOLUTION/ DROPS OPHTHALMIC at 06:50

## 2019-07-09 ASSESSMENT — MIFFLIN-ST. JEOR: SCORE: 2279.31

## 2019-07-09 NOTE — ANESTHESIA PREPROCEDURE EVALUATION
Anesthesia Pre-Procedure Evaluation    Patient: Otoniel Murry   MRN:     [id number] Gender:   male   Age:    39 year old :      1980        Preoperative Diagnosis: Retinal Detachment   Procedure(s):  Right Eye 25 Vitrectomy, Fluid Gas Exchange, Silicone Oil Removal, Possible Endolaser     Past Medical History:   Diagnosis Date     Glaucoma (increased eye pressure)       Past Surgical History:   Procedure Laterality Date     ORBITOTOMY Right 2018     ORTHOPEDIC SURGERY      thumb     VITRECTOMY PARSPLANA Right 2018    25  gauge pars plana vitectomy, vitreous biopsy, posterior hyaloid peel, endolaser; intravitreal antibiotics right eye      VITRECTOMY PARSPLANA WITH 23 GAUGE SYSTEM Right 2018    Procedure: 23 GAUGE VITRECTOMY, REMOVAL OF INTRAOCULAR FOREIGN BODY RIGHT EYE. MEMBRANE PEEL, VITREOUS BIOPSY, INTRAVITREAL ANTIBIOTICS, ANTERIOR SEGMENT RECONSTRUCTION, CORNEAL WOUND CLOSURE;  Surgeon: Tamera Walsh MD;  Location:  [facility]     VITRECTOMY PARSPLANA WITH 25 GAUGE SYSTEM Right 2018    Procedure: Right VITRECTOMY PARSPLANA WITH 25 GAUGE SYSTEM, vitreal antibiotic injections, endolaser, air fluid exchange, vitreal cultures;  Surgeon: Tamera Walsh MD;  Location:  [facility]     VITRECTOMY PARSPLANA WITH 25 GAUGE SYSTEM Right 2018    Procedure: Right Eye 25g Pars Plana Vitrectomy, Membrane Peel, retinectomy, Fluid/Air Exchange, Silicone Oil , Scleral Buckle, Endolaser;  Surgeon: Tamera Walsh MD;  Location:  OR          Anesthesia Evaluation     . Pt has had prior anesthetic. Type: General    No history of anesthetic complications          ROS/MED HX    ENT/Pulmonary:  - neg pulmonary ROS     Neurologic:  - neg neurologic ROS     Cardiovascular:  - neg cardiovascular ROS       METS/Exercise Tolerance:  >4 METS   Hematologic:  - neg hematologic  ROS       Musculoskeletal:  - neg musculoskeletal ROS       GI/Hepatic:  - neg GI/hepatic ROS      "  Renal/Genitourinary:  - ROS Renal section negative       Endo:  - neg endo ROS       Psychiatric:  - neg psychiatric ROS       Infectious Disease:  - neg infectious disease ROS       Malignancy:      - no malignancy   Other:                         PHYSICAL EXAM:   Mental Status/Neuro: A/A/O   Airway: Facies: Feasible  Mallampati: I  Mouth/Opening: Full  TM distance: > 6 cm  Neck ROM: Full   Respiratory: Auscultation: CTAB     Resp. Rate: Normal     Resp. Effort: Normal      CV: Rhythm: Regular  Rate: Age appropriate  Heart: Normal Sounds   Comments:      Dental: Normal                  No results found for: WBC, HGB, HCT, PLT, CRP, SED, NA, POTASSIUM, CHLORIDE, CO2, BUN, CR, GLC, CEZAR, PHOS, MAG, ALBUMIN, PROTTOTAL, ALT, AST, GGT, ALKPHOS, BILITOTAL, BILIDIRECT, LIPASE, AMYLASE, LEROY, PTT, INR, FIBR, TSH, T4, T3, HCG, HCGS, CKTOTAL, CKMB, TROPN    Preop Vitals  BP Readings from Last 3 Encounters:   07/09/19 126/82   11/25/18 (!) 144/92   11/20/18 132/72    Pulse Readings from Last 3 Encounters:   07/09/19 65   11/25/18 72   11/20/18 54      Resp Readings from Last 3 Encounters:   07/09/19 18   11/25/18 16   11/20/18 16    SpO2 Readings from Last 3 Encounters:   07/09/19 98%   11/25/18 99%   11/20/18 99%      Temp Readings from Last 1 Encounters:   07/09/19 36.6  C (97.8  F) (Oral)    Ht Readings from Last 1 Encounters:   07/09/19 1.854 m (6' 1\")      Wt Readings from Last 1 Encounters:   07/09/19 131.5 kg (290 lb)    Estimated body mass index is 38.26 kg/m  as calculated from the following:    Height as of this encounter: 1.854 m (6' 1\").    Weight as of this encounter: 131.5 kg (290 lb).     LDA:  Peripheral IV 07/09/19 Right Hand (Active)   Site Assessment WDL 7/9/2019  7:08 AM   Line Status Infusing 7/9/2019  7:08 AM   Phlebitis Scale 0-->no symptoms 7/9/2019  7:08 AM   Infiltration Scale 0 7/9/2019  7:08 AM   Infiltration Site Treatment Method  None 7/9/2019  7:08 AM   Extravasation? No 7/9/2019  7:08 AM "   Dressing Intervention New dressing  7/9/2019  7:08 AM   Number of days: 0            Assessment:   ASA SCORE: 1       Documentation: H&P complete; Preop Testing complete; Consents complete   Proceeding: Proceed without further delay  Tobacco Use:  NO Active use of Tobacco/UNKNOWN Tobacco use status     Plan:   Anes. Type:  MAC   Pre-Induction: Midazolam IV   Induction:  Not applicable   Airway: Native Airway   Access/Monitoring: PIV   Maintenance: N/a   Emergence: N/a   Logistics: Same Day Surgery     Postop Pain/Sedation Strategy:  Standard-Options: Opioids PRN     PONV Management:  Adult Risk Factors:, Non-Smoker, Postop Opioids  Prevention: Ondansetron; Dexamethasone     CONSENT: Direct conversation   Plan and risks discussed with: Patient                            Jaron Castillo MD, MD

## 2019-07-09 NOTE — ANESTHESIA POSTPROCEDURE EVALUATION
Anesthesia POST Procedure Evaluation    Patient: Otoniel Murry   MRN:     [id number] Gender:   male   Age:    39 year old :      1980        Preoperative Diagnosis: Retinal Detachment   Procedure(s):  Right Eye 25 Gauge Vitrectomy, Fluid Gas Exchange, Silicone Oil Removal   Postop Comments: No value filed.       Anesthesia Type:  MAC  No value filed.    Reportable Event: NO     PAIN: Uncomplicated   Sign Out status: Comfortable, Well controlled pain     PONV: No PONV   Sign Out status:  No Nausea or Vomiting     Neuro/Psych: Uneventful perioperative course   Sign Out Status: Preoperative baseline; Age appropriate mentation     Airway/Resp.: Uneventful perioperative course   Sign Out Status: Non labored breathing, age appropriate RR; Resp. Status within EXPECTED Parameters     CV: Uneventful perioperative course   Sign Out status: Appropriate BP and perfusion indices; Appropriate HR/Rhythm     Disposition:   Sign Out in:  PACU  Disposition:  Phase II; Home  Recovery Course: Uneventful  Follow-Up: Not required           Last Anesthesia Record Vitals:  CRNA VITALS  2019 0817 - 2019 0917      2019             NIBP:  123/83    NIBP Mean:  94          Last PACU Vitals:  Vitals Value Taken Time   BP     Temp 36.6  C (97.9  F) 2019  8:50 AM   Pulse 66 2019  8:50 AM   Resp 16 2019  8:50 AM   SpO2 97 % 2019  8:50 AM   Temp src     NIBP 123/83 2019  8:48 AM   Pulse 52 2019  8:46 AM   SpO2 99 % 2019  8:46 AM   Resp     Temp     Ht Rate     Temp 2           Electronically Signed By: Jaron Castillo MD, MD, 2019, 9:35 AM

## 2019-07-09 NOTE — ANESTHESIA CARE TRANSFER NOTE
Patient: Otoniel Murry    Procedure(s):  Right Eye 25 Gauge Vitrectomy, Fluid Gas Exchange, Silicone Oil Removal    Diagnosis: Retinal Detachment  Diagnosis Additional Information: No value filed.    Anesthesia Type:   No value filed.     Note:  Airway :Room Air  Patient transferred to:Phase II  Comments: Uneventful transport to Phase 2  Report to RN  Exchanging well; color natl  Pt responds appropriately to command  IV patent  Lips/teeth/dentition as preop status  Questions answered  /80  HR 48  RR 18  Sat 98%      Vitals: (Last set prior to Anesthesia Care Transfer)    CRNA VITALS  7/9/2019 0817 - 7/9/2019 0852      7/9/2019             NIBP:  123/83    NIBP Mean:  94                Electronically Signed By: DMITRIY VALENCIA CRNA  July 9, 2019  8:52 AM

## 2019-07-09 NOTE — ADDENDUM NOTE
Addendum  created 07/09/19 0946 by Vidhi Hayes APRN CRNA    Intraprocedure Meds edited, Orders acknowledged in Narrator

## 2019-07-09 NOTE — BRIEF OP NOTE
[facility] Brief Operative Note    Pre-operative diagnosis: Retinal Detachment   Post-operative diagnosis same   Procedure: Procedure(s):  Right Eye 25 Gauge Vitrectomy, Fluid Gas Exchange, Silicone Oil Removal   Surgeon(s): Surgeon(s) and Role:     * Tamera Walsh MD - Primary     * Otoniel Lemon MD - Resident - Assisting   Han Romero MD   Estimated blood loss: Minimal   Specimens: * No specimens in log *   Findings: As expected    Otoniel Lemon MD PGY 3 Ophthalmology

## 2019-07-09 NOTE — DISCHARGE INSTRUCTIONS
[facility]  Home Care Following Anesthesia  For 24 hours after surgery:  1. Get plenty of rest.  A responsible adult must stay with you for at least 24 hours after you leave the surgery center.  2. Do not drive or use heavy equipment.  If you have weakness or tingling, don't drive or use heavy equipment until this feeling goes away.   3. Do not drink alcohol.   4. Avoid strenuous or risky activities.  Ask for help when climbing stairs.  5. You may feel lightheaded.  IF so, sit for a few minutes before standing.  Have someone help you get up.   6. If you have nausea (feel sick to your stomach): Drink only clear liquids such as apple juice, ginger ale, broth or 7-Up.  Rest may also help.  Be sure to drink enough fluids.  Move to a regular diet as you feel able.   7. You may have a slight fever.  Call the doctor if your fever is over 100 F (37.7 C) (taken under the tongue) or lasts longer than 24 hours.  8. You may have a dry mouth, a sore throat, muscle aches or trouble sleeping. These should go away after 24 hours.  9. Do not make important or legal decisions.               Tips for taking pain medications  To get the best pain relief possible, remember these points:    Take pain medications as directed, before pain becomes severe.    Pain medication can upset your stomach: taking it with food may help.    Constipation is a common side effect of pain medication. Drink plenty of  fluids.    Eat foods high in fiber. Take a stool softener if recommended by your doctor or pharmacist.    Do not drink alcohol, drive or operate machinery while taking pain medications.    Ask about other ways to control pain, such as with heat, ice or relaxation.    Tylenol/Acetaminophen Consumption  To help encourage the safe use of acetaminophen, the makers of TYLENOL  have lowered the maximum daily dose for single-ingredient Extra Strength TYLENOL  (acetaminophen) products sold in the [place] from 8  pills per day (4,000 mg) to 6 pills per day (3,000 mg). The dosing interval has also changed from 2 pills every 4-6 hours to 2 pills every 6 hours.    If you feel your pain relief is insufficient, you may take Tylenol/Acetaminophen in addition to your narcotic pain medication.     Be careful not to exceed 3,000 mg of Tylenol/Acetaminophen in a 24 hour period from all sources.    If you are taking extra strength Tylenol/acetaminophen (500 mg), the maximum dose is 6 tablets in 24 hours.    If you are taking regular strength acetaminophen (325 mg), the maximum dose is 9 tablets in 24 hours.    Call a doctor for any of the followin. Signs of infection (fever, growing tenderness at the surgery site, a large amount of drainage or bleeding, severe pain, foul-smelling drainage, redness, swelling).  2. It has been over 8 to 10 hours since surgery and you are still not able to urinate (pass water).  3. Headache for over 24 hours.  4. Numbness, tingling or weakness the day after surgery (if you had spinal anesthesia).  Your doctor is:       Dr. Tamera Walsh, Ophthalmology: [phone]               Or dial [phone] and ask for the resident on call for:  Ophthalmology  For emergency care, call the:  [facility]:  [phone] (TTY for hearing impaired: [phone])    [facility]  [phone]  Post Operative Eye Surgery Instructions    MD Tamera Natarajan MD  Will I have pain?  Some discomfort is normal and expected following surgery. The first few days after surgery you may need to use prescription pain pills. Taking Tylenol (acetaminophen) regularly may help prevent pain.  Discomfort should gradually decrease and Tylenol should be sufficient to relieve pain. A foreign body sensation in the cornea of the eye is very common and caused by sutures placed at surgery. These sutures will go away in one to two weeks. If the pain worsens, you should call the  doctor.  Do I need to wear an eye patch?  You do not need to wear an eye patch at home after the doctor has removed the patch on your first day after surgery. However, you may be more comfortable wearing a patch outside in the sun, when sleeping or napping, or in a atif, windy environment.  How much drainage should I have?  You may expect a moderate amount of drainage for a week. Gradually the drainage should decrease. The lids can be cleaned with a clean washcloth and gentle soap or diluted baby shampoo. Wipe the eyelids gently from the nose outward. Some blood in the tears is a normal finding.  Will there be swelling?  Some swelling is normal for about a week or two after which it will gradually decrease. Applying a cool compress, using a clean washcloth for 5 - 10 minutes several times a day may reduce the swelling and make you more comfortable. People may have some swelling of both eyes, especially if face down positioning is required. The white part of the eye may appear very red or bloody for a week or two. This may get worse a few days after surgery. Though the bright red appearance can look frightening, it is a normal finding early after surgery and will resolve in a few weeks.  Will I need to use eye drops?  You will be using several different kinds of eye drops or ointment (salve) when you leave the hospital. The directions will be on each bottle or tube. The medication with the red top will keep your eye dilated and may make your eye more sensitive to light. Wearing sunglasses may help. The other medication is a combination antibiotic/steroid to prevent infection and promote healing.  Occasionally a third drop is used to control the pressure in your eye. A new bottle of artificial tears or lubricant ointment may be used along with your prescription eye drops after surgery. You will be using drops from four to eight weeks. Bring all eye medications (drops. ointments, or pills) with you  to each visit.    Always wash your hands before putting in the eye drops. You may wish to have someone else help you. Pull down on the lower lid and squeeze one drop from the bottle being careful not to touch the dropper to your eye or eyelid. One drop is sufficient, but another may be used if the first did not go into the eye. It is often easier to put in the drops if you are reclining or lying down. Wait five (5) minutes after the first drop before using the second drop to allow the medications to absorb into the eye.  How long will it take for my vision to improve?  Your vision should gradually improve, but it may take up to six months to regain your best vision. Frequently, air or gas bubbles are injected into the eye at the time of surgery. This will blur your vision significantly at first. As the bubble becomes smaller it will cause a black line in your vision that moves as you move your head. As the bubble becomes smaller you may notice that it looks more like a bubble or that it will break up into several smaller bubbles. It will take from a few days to a few weeks for the bubble to dissolve and be replaced by clear fluid.  You may notice floaters or double vision after your surgery. These symptoms usually will decrease with time. If the double vision is bothersome patching the eye may help.  If you notice a sudden worsening in your vision call your doctor.  Are there any physical restrictions after surgery?  Heavy lifting (greater than 50 pounds), swimming and contact sports should be avoided for about 3 to 4 weeks after surgery.  You may resume your usual sexual activities about one week after surgery.  When may I return to work or my normal activities?  Depending on the type or work, you may return to work within a few days. If your work involves physical activity or driving, you will need to restrict your activities and remain home longer.  You may watch television, look at magazines, or work puzzles. Reading may be  uncomfortable for several days, but using the eyes will not cause any damage.  You may go outside as usual. If conditions are windy or atif, wear an eye pad to avoid getting dust or dirt in the eye.    Are there any driving restrictions?  Someone will need to drive you home from the hospital. Generally driving can be resumed in several days if you have good vision in your other eye. If you do not feel comfortable driving, do not drive! Your depth perception will be decreased so you will want to try driving during the day in light traffic until you feel comfortable driving. You should restrict your driving while you are taking prescription pain pills as they also can affect your judgment.  When can I shower and wash my hair?  You may shower or bathe when you get home, but avoid getting water in your eye. You may want someone to help you shampoo your hair at first.  You may shave, brush your teeth, or comb your hair. Do not use make-up, mascara, or creams/lotions around your eye for several weeks.  When will I see the doctor again?  Generally, you will be seen the first day after surgery and again 1-2 weeks later. If you have not received a return appointment before leaving the hospital, you should call our office during the business hours to arrange an appointment. If you will be seeing your local doctor instead of us, you will need to call that office to set up an appointment.    If you have a green armband on, your physician will instruct you as to how long you will have to wear it at your post-operative follow-up appointment.  How do I reach a doctor if I have concerns?  One of our doctors is available by calling [facility] [phone]. Please try to call for routine questions and prescription refills during business hours.  You should call your doctor if:  You notice a sudden decrease in your vision.  Have severe pain or pain increases rather than subsiding.  You notice a new black  curtain over your eye that is not the gas bubble. If you have any of these symptoms, you may need to be examined.

## 2019-07-09 NOTE — OP NOTE
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  25 gauge pars plana vitectomy, SO removal, air-fluid exchange  ANESTHESIA: Monitored Anesthesia Care and Retrobulbar block  COMPLICATIONS: none   INDICATIONS: Otoniel Murry is A 39 year old old patient with diagnosis of Retinal detachment.    DESCRIPTION OF THE PROCEDURE   The patient was brought into the OR where Monitored Anesthesia Care was given by the anesthesia department. A peribulbar block consistent of a 1:1 mixtures of 2% Lidocaine and 0.75 % of marcaine with epinephrine and wydase was administered.    The eye was then prepped and draped in the usual fashion for ophthalmic surgery, including the installation of one drop of povidone iodine   Attention was then turned to the vitrectomy. Marks were made on the sclera inferotemporally, superotemporally, and superonasally 3.5 mm posterior to the limbus. The 25g transscleral cannulas were inserted through the sclera using the trocars. The infusion cannula was connected inferotemporally and directly visualized to verify it was in the correct location. Then Silicone Oil was removed using vitrectomy machine suction. Then, the vitrector handpiece and endoilluiminator were placed in the eye and it was noted that retina was well attached with no epiretinal membranes or retinal tractions or retinal tears.   Air fluid exchange was preformed multiple times to ensure all SO particles are removed.   Then, cannulas were removed. The sclerotomies were sutured with 6-0 plain suture and were airtight. The pressure was checked and verified to be appropriate. Subconjunctival injections of ancef and dexamethasone were administered. The lid speculum was removed. The eye was cleaned with wet and dry gauze. A drop  of atropine and maxitrol ointment was placed in the eye. An eye pad and chapman shield were taped over the eye.   The patient tolerated well the procedure and was discharge to the post-operative unit in stable conditions with no complications.    The surgery was assisted by Dr. Han Romero. Due to the delicate and complex nature of this surgery, Dr. Han Romero was required. Dr. Han Romero assisted with Silicone Oil removal. I was present for the entire surgery.

## 2019-07-10 ENCOUNTER — OFFICE VISIT (OUTPATIENT)
Dept: OPHTHALMOLOGY | Facility: CLINIC | Age: 39
End: 2019-07-10
Attending: OPHTHALMOLOGY
Payer: COMMERCIAL

## 2019-07-10 DIAGNOSIS — Z48.810 AFTERCARE FOLLOWING SURGERY OF A SENSORY ORGAN: Primary | ICD-10-CM

## 2019-07-10 ASSESSMENT — SLIT LAMP EXAM - LIDS
COMMENTS: NORMAL
COMMENTS: NORMAL

## 2019-07-10 ASSESSMENT — EXTERNAL EXAM - RIGHT EYE: OD_EXAM: NORMAL

## 2019-07-10 ASSESSMENT — VISUAL ACUITY
OD_CC: 20/1250
OS_CC: 20/20
METHOD: SNELLEN - LINEAR

## 2019-07-10 ASSESSMENT — CUP TO DISC RATIO: OD_RATIO: 0.2

## 2019-07-10 ASSESSMENT — TONOMETRY: OD_IOP_MMHG: 6

## 2019-07-10 ASSESSMENT — EXTERNAL EXAM - LEFT EYE: OS_EXAM: NORMAL

## 2019-07-10 NOTE — PROGRESS NOTES
Postoperative day 1 status 25 g post Silicone Oil removal AFx 07/09/19     Slept well  Retina attached  Doing well    Plan:  Position: any  No aviation  No heavy lifting   Crow shield at all times  Retina detachment and endophthalmitis precautions were discussed with the patient and was asked to return if any of the those occur    Medications to operative eye  Maxitrol (pink top) 6 times a day    Maxitrol oint at bedtime  Atropine (red top) once a day     Follow up in one week  ~~~~~~~~~~~~~~~~~~~~~~~~~~~~~~~~~~   Complete documentation of historical and exam elements from today's encounter can be found in the full encounter summary report (not reduplicated in this progress note).  I personally obtained the chief complaint(s) and history of present illness.  I confirmed and edited as necessary the review of systems, past medical/surgical history, family history, social history, and examination findings as documented by others; and I examined the patient myself.  I personally reviewed the relevant tests, images, and reports as documented above.  I formulated and edited as necessary the assessment and plan and discussed the findings and management plan with the patient and family    Tamera Walsh MD  .  Retina Service   Department of Ophthalmology and Visual Neurosciences   [facility]  Phone: [phone]   Fax: [phone]

## 2019-07-10 NOTE — PATIENT INSTRUCTIONS
Position: any  No aviation  No heavy lifting   Crow shield at all times  Retina detachment and endophthalmitis precautions were discussed with the patient and was asked to return if any of the those occur    Medications to operative eye  Maxitrol (pink top) 6 times a day    Maxitrol oint at bedtime  Atropine (red top) once a day     Follow up in one week

## 2019-07-17 ENCOUNTER — OFFICE VISIT (OUTPATIENT)
Dept: OPHTHALMOLOGY | Facility: CLINIC | Age: 39
End: 2019-07-17
Attending: OPHTHALMOLOGY
Payer: COMMERCIAL

## 2019-07-17 DIAGNOSIS — Z48.810 AFTERCARE FOLLOWING SURGERY OF A SENSORY ORGAN: Primary | ICD-10-CM

## 2019-07-17 PROCEDURE — G0463 HOSPITAL OUTPT CLINIC VISIT: HCPCS | Mod: ZF

## 2019-07-17 RX ORDER — PREDNISOLONE ACETATE 10 MG/ML
1-2 SUSPENSION/ DROPS OPHTHALMIC
COMMUNITY
End: 2019-08-21

## 2019-07-17 RX ORDER — ATROPINE SULFATE 10 MG/ML
1-2 SOLUTION/ DROPS OPHTHALMIC DAILY
COMMUNITY
End: 2019-08-21

## 2019-07-17 RX ORDER — NEOMYCIN SULFATE, POLYMYXIN B SULFATE, AND DEXAMETHASONE 3.5; 10000; 1 MG/G; [USP'U]/G; MG/G
0.5 OINTMENT OPHTHALMIC AT BEDTIME
COMMUNITY
End: 2019-08-21

## 2019-07-17 ASSESSMENT — TONOMETRY
IOP_METHOD: TONOPEN
OD_IOP_MMHG: 24
OS_IOP_MMHG: 17

## 2019-07-17 ASSESSMENT — VISUAL ACUITY
OD_PH_CC: 20/300
OS_CC: 20/20
METHOD: SNELLEN - LINEAR
OD_CC: 5'/200E

## 2019-07-17 ASSESSMENT — SLIT LAMP EXAM - LIDS
COMMENTS: NORMAL
COMMENTS: NORMAL

## 2019-07-17 ASSESSMENT — EXTERNAL EXAM - LEFT EYE: OS_EXAM: NORMAL

## 2019-07-17 ASSESSMENT — CUP TO DISC RATIO: OD_RATIO: 0.2

## 2019-07-17 ASSESSMENT — EXTERNAL EXAM - RIGHT EYE: OD_EXAM: NORMAL

## 2019-07-17 NOTE — NURSING NOTE
Chief Complaints and History of Present Illnesses   Patient presents with     Post Op (Ophthalmology) Right Eye     Chief Complaint(s) and History of Present Illness(es)     Post Op (Ophthalmology) Right Eye               Comments     POP right eye  25 g post Silicone Oil removal AFx 07/09/19.  The patient denies eye pain. He notes improved vision since last week.  He is using Atropine once daily, Prednisolone six times daily and Maxitrol ointment at night in the right eye.  Kaelyn Hudson, EROS 10:00 AM 07/17/2019

## 2019-07-17 NOTE — PROGRESS NOTES
Postoperative day 8 status 25 g post Silicone Oil removal AFx 07/09/19     Slept well  Retina attached  Doing well    Plan:  Position: any  No heavy lifting   Crow shield at all times  Retina detachment and endophthalmitis precautions were discussed with the patient and was asked to return if any of the those occur    Medications to operative eye  Maxitrol (pink top) four times a day  And slow tapering  Three times a day x 1 week, then twice a day x 1 week and then once a day till finish    Maxitrol oint at bedtime. Ok to stop  Atropine stop     Follow up in 3-4 weeks. Prescription. optos and Optical Coherence Tomography pictures  ~~~~~~~~~~~~~~~~~~~~~~~~~~~~~~~~~~   Complete documentation of historical and exam elements from today's encounter can be found in the full encounter summary report (not reduplicated in this progress note).  I personally obtained the chief complaint(s) and history of present illness.  I confirmed and edited as necessary the review of systems, past medical/surgical history, family history, social history, and examination findings as documented by others; and I examined the patient myself.  I personally reviewed the relevant tests, images, and reports as documented above.  I formulated and edited as necessary the assessment and plan and discussed the findings and management plan with the patient and family    Tamera Walsh MD  .  Retina Service   Department of Ophthalmology and Visual Neurosciences   [facility]  Phone: [phone]   Fax: [phone]

## 2019-08-19 DIAGNOSIS — S05.32XD RUPTURED GLOBE OF LEFT EYE, SUBSEQUENT ENCOUNTER: Primary | ICD-10-CM

## 2019-08-21 ENCOUNTER — OFFICE VISIT (OUTPATIENT)
Dept: OPHTHALMOLOGY | Facility: CLINIC | Age: 39
End: 2019-08-21
Attending: OPHTHALMOLOGY
Payer: COMMERCIAL

## 2019-08-21 DIAGNOSIS — Z48.810 AFTERCARE FOLLOWING SURGERY OF A SENSORY ORGAN: Primary | ICD-10-CM

## 2019-08-21 DIAGNOSIS — S05.32XD RUPTURED GLOBE OF LEFT EYE, SUBSEQUENT ENCOUNTER: ICD-10-CM

## 2019-08-21 PROCEDURE — 92250 FUNDUS PHOTOGRAPHY W/I&R: CPT | Mod: ZF | Performed by: OPHTHALMOLOGY

## 2019-08-21 PROCEDURE — 92134 CPTRZ OPH DX IMG PST SGM RTA: CPT | Mod: ZF | Performed by: OPHTHALMOLOGY

## 2019-08-21 PROCEDURE — 92015 DETERMINE REFRACTIVE STATE: CPT | Mod: ZF

## 2019-08-21 PROCEDURE — G0463 HOSPITAL OUTPT CLINIC VISIT: HCPCS | Mod: 25,ZF

## 2019-08-21 ASSESSMENT — TONOMETRY
OS_IOP_MMHG: 12
OD_IOP_MMHG: 14
IOP_METHOD: TONOPEN

## 2019-08-21 ASSESSMENT — VISUAL ACUITY
OS_CC: 20/25
METHOD: SNELLEN - LINEAR

## 2019-08-21 ASSESSMENT — EXTERNAL EXAM - LEFT EYE: OS_EXAM: NORMAL

## 2019-08-21 ASSESSMENT — CONF VISUAL FIELD
OS_NORMAL: 1
OD_SUPERIOR_NASAL_RESTRICTION: 1
OD_INFERIOR_NASAL_RESTRICTION: 3
METHOD: COUNTING FINGERS
OD_INFERIOR_TEMPORAL_RESTRICTION: 3

## 2019-08-21 ASSESSMENT — REFRACTION_MANIFEST
OS_SPHERE: -3.25
OS_CYLINDER: SPHERE
OD_SPHERE: +6.50
OD_AXIS: 113
OD_CYLINDER: +1.25

## 2019-08-21 ASSESSMENT — REFRACTION_WEARINGRX
OD_SPHERE: -3.00
SPECS_TYPE: SVL
OS_CYLINDER: SPHERE
OS_SPHERE: -3.25
OD_CYLINDER: SPHERE

## 2019-08-21 ASSESSMENT — SLIT LAMP EXAM - LIDS
COMMENTS: NORMAL
COMMENTS: NORMAL

## 2019-08-21 ASSESSMENT — EXTERNAL EXAM - RIGHT EYE: OD_EXAM: NORMAL

## 2019-08-21 ASSESSMENT — CUP TO DISC RATIO
OS_RATIO: 0.3
OD_RATIO: 0.2

## 2019-08-21 NOTE — PROGRESS NOTES
Postoperative week 6 status post right eye 25 g post Silicone Oil removal AFx 07/09/19. Reports that vision seems worse since SOR. Feels like right eye peripheral vision is worse and that vision overall seems more blurred. However, felt that he saw well with manifest refraction. Off all post-op gtts.      OCULAR IMAGING  Optical Coherence Tomography 8-21-19  Right eye: slight irregularity of the retina surface; subfoveal irregularity of the IS/OS and Retinal pigment epithelium. Epiretinal membrane nasal  Left eye: good foveal contour    PHOTOS 8-21-19  Right eye c/w exam  Left eye c/w exam    Plan:  Position: any  No heavy lifting     Advised monocular precautions: polycarbonate lenses with glasses on at all times.     Retina detachment and endophthalmitis precautions were discussed with the patient and was asked to return if any of the those occur    Medications to operative eye  ATs as needed    manifest refraction today with best corrected visual acuity 20/80 right eye, 20/20 left eye .     Moving forward, patient would benefit from Yamane vs evaluation for CTLs - would also likely help with surface irregularity.     Follow up in 2 months with retina and CL   With optos   Consider trial of CTLs first    Otoniel Lemon MD PGY3 Ophthalmology Resident  ~~~~~~~~~~~~~~~~~~~~~~~~~~~~~~~~~~   Complete documentation of historical and exam elements from today's encounter can be found in the full encounter summary report (not reduplicated in this progress note).  I personally obtained the chief complaint(s) and history of present illness.  I confirmed and edited as necessary the review of systems, past medical/surgical history, family history, social history, and examination findings as documented by others; and I examined the patient myself.  I personally reviewed the relevant tests, images, and reports as documented above.  I formulated and edited as necessary the assessment and plan and discussed the findings and  management plan with the patient and family    Tamera Walsh MD  .  Retina Service   Department of Ophthalmology and Visual Neurosciences   [facility]  Phone: [phone]   Fax: [phone]

## 2019-10-21 DIAGNOSIS — S05.32XD RUPTURED GLOBE OF LEFT EYE, SUBSEQUENT ENCOUNTER: Primary | ICD-10-CM

## 2019-10-23 ENCOUNTER — OFFICE VISIT (OUTPATIENT)
Dept: OPHTHALMOLOGY | Facility: CLINIC | Age: 39
End: 2019-10-23
Attending: OPHTHALMOLOGY
Payer: COMMERCIAL

## 2019-10-23 ENCOUNTER — OFFICE VISIT (OUTPATIENT)
Dept: OPTOMETRY | Facility: CLINIC | Age: 39
End: 2019-10-23
Payer: COMMERCIAL

## 2019-10-23 DIAGNOSIS — H17.9 CORNEAL SCAR AND OPACITY: ICD-10-CM

## 2019-10-23 DIAGNOSIS — H52.211 IRREGULAR ASTIGMATISM OF RIGHT EYE: ICD-10-CM

## 2019-10-23 DIAGNOSIS — H27.01 APHAKIA OF RIGHT EYE: Primary | ICD-10-CM

## 2019-10-23 DIAGNOSIS — S05.31XD RUPTURED GLOBE OF RIGHT EYE, SUBSEQUENT ENCOUNTER: Primary | ICD-10-CM

## 2019-10-23 PROCEDURE — G0463 HOSPITAL OUTPT CLINIC VISIT: HCPCS | Mod: ZF

## 2019-10-23 PROCEDURE — 92250 FUNDUS PHOTOGRAPHY W/I&R: CPT | Mod: ZF,59 | Performed by: OPHTHALMOLOGY

## 2019-10-23 PROCEDURE — 92134 CPTRZ OPH DX IMG PST SGM RTA: CPT | Mod: ZF | Performed by: OPHTHALMOLOGY

## 2019-10-23 ASSESSMENT — TONOMETRY
OS_IOP_MMHG: 16
OD_IOP_MMHG: 18
IOP_METHOD: TONOPEN

## 2019-10-23 ASSESSMENT — KERATOMETRY
METHOD_AUTO_MANUAL: TOPO
OD_K2POWER_DIOPTERS: 45.42
OD_AXISANGLE2_DEGREES: 002
OD_K1POWER_DIOPTERS: 44.06
OD_AXISANGLE_DEGREES: 092

## 2019-10-23 ASSESSMENT — REFRACTION_CURRENTRX
OD_BASECURVE: 8.6
OD_DIAMETER: 11.2
OD_BRAND: BIOFINITY XR
OD_BRAND: ROSE K2 IC
OD_BASECURVE: 45.00
OD_SPHERE: -3.00
OD_DIAMETER: 14.0
OD_SPHERE: +9.00

## 2019-10-23 ASSESSMENT — CONF VISUAL FIELD
OD_NORMAL: 1
OS_NORMAL: 1
METHOD: COUNTING FINGERS

## 2019-10-23 ASSESSMENT — CUP TO DISC RATIO
OD_RATIO: 0.2
OS_RATIO: 0.3

## 2019-10-23 ASSESSMENT — VISUAL ACUITY
OS_CC: 20/20
CORRECTION_TYPE: GLASSES
OS_CC: 20/20
METHOD: SNELLEN - LINEAR
CORRECTION_TYPE: GLASSES
OD_CC: 20/600
OD_CC: 20/600
METHOD: SNELLEN - LINEAR

## 2019-10-23 ASSESSMENT — EXTERNAL EXAM - RIGHT EYE
OD_EXAM: NORMAL
OD_EXAM: NORMAL

## 2019-10-23 ASSESSMENT — REFRACTION_WEARINGRX
SPECS_TYPE: SVL
OS_CYLINDER: SPHERE
OS_SPHERE: -3.25
OS_SPHERE: -3.25
OD_CYLINDER: SPHERE
SPECS_TYPE: SVL
OS_CYLINDER: SPHERE
OD_CYLINDER: SPHERE
OD_SPHERE: -3.00
OD_SPHERE: -3.00

## 2019-10-23 ASSESSMENT — PACHYMETRY: OD_CT(UM): 500

## 2019-10-23 ASSESSMENT — EXTERNAL EXAM - LEFT EYE
OS_EXAM: NORMAL
OS_EXAM: NORMAL

## 2019-10-23 ASSESSMENT — SLIT LAMP EXAM - LIDS
COMMENTS: NORMAL

## 2019-10-23 NOTE — PROGRESS NOTES
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 Wearing glasses full time for protection  -Reviewed findings with pt, he would like to proceed with RGP    Dispensed soft lens today to begin adaptation. Will order RGP and have pt RTC 2+ weeks for lens dispense, I&R and further eval. He is seeing Dr. Walsh today who will discuss possible surgery vs monitoring

## 2019-10-23 NOTE — PROGRESS NOTES
CC: Ruptured globe right eye     Interval Hx:  S/p right eye 25g PPV/SO removal/AFx (7/9/2019)  Met with Dr. Lazaro today for CTL evaluation - BCVA 20/50+ with hard CTL (dispensed soft CTL for trial prior to hard CTL)       OCULAR IMAGING  Optical Coherence Tomography 10-23-19  Right eye: slight irregularity of the retina surface; subfoveal irregularity of the IS/OS and Retinal pigment epithelium with less disruption of EZ/ELM on OCT today. Epiretinal membrane nasal  Left eye: good foveal contour    PHOTOS 10-23-19  Right eye c/w exam  Left eye c/w exam    Assessment/Plan:   1. Ruptured globe right eye  - S/p PPV/SBP/MS/retinectomy/SO 1000/IVT (11/2018, Dr. Walsh)  - S/p right eye 25g PPV/SO removal/AFx (7/9/2019)  - Advised monocular precautions: polycarbonate lenses with glasses on at all times.   - MRx 7/2019 BCVA 20/80 right eye, 20/20 left eye   - CTL fitting 10/23/19 with BCVA 20/50+ with hard CTL    2. Ocular hypertension, right eye  -on timolol qAM, brimonidine twice a day  - consider stopping timolol     3. Aphakia, right eye  -vision 20/400 today with +12.0 lens  -prior JACOB was 20/150  -consider secondary IOL insertion  -needs IOL calcs     Follow up in 4 months with Optical Coherence Tomography     Rizwan Mendoza MD  Ophthalmology Resident  PGY-3     ~~~~~~~~~~~~~~~~~~~~~~~~~~~~~~~~~~   Complete documentation of historical and exam elements from today's encounter can be found in the full encounter summary report (not reduplicated in this progress note).  I personally obtained the chief complaint(s) and history of present illness.  I confirmed and edited as necessary the review of systems, past medical/surgical history, family history, social history, and examination findings as documented by others; and I examined the patient myself.  I personally reviewed the relevant tests, images, and reports as documented above.  I formulated and edited as necessary the assessment and plan and discussed the  findings and management plan with the patient and family    Tamera Walsh MD  .  Retina Service   Department of Ophthalmology and Visual Neurosciences   [facility]  Phone: [phone]   Fax: [phone]

## 2019-10-23 NOTE — NURSING NOTE
Chief Complaints and History of Present Illnesses   Patient presents with     Follow Up     Aftercare following surgery of a sensory organ - Right Eye     Chief Complaint(s) and History of Present Illness(es)     Follow Up     Laterality: right eye    Associated symptoms: Negative for eye pain, redness, dryness and tearing    Pain scale: 0/10    Comments: Aftercare following surgery of a sensory organ - Right Eye              Comments     He states that his vision has seemed stable in both eyes, since his last eye exam.  Patient denies having any eye discomfort.     PAZ Alamo 11:10 AM  October 23, 2019

## 2019-11-13 ENCOUNTER — OFFICE VISIT (OUTPATIENT)
Dept: OPTOMETRY | Facility: CLINIC | Age: 39
End: 2019-11-13
Payer: COMMERCIAL

## 2019-11-13 DIAGNOSIS — H17.9 CORNEAL SCAR AND OPACITY: ICD-10-CM

## 2019-11-13 DIAGNOSIS — H27.01 APHAKIA OF RIGHT EYE: Primary | ICD-10-CM

## 2019-11-13 DIAGNOSIS — H52.211 IRREGULAR ASTIGMATISM OF RIGHT EYE: ICD-10-CM

## 2019-11-13 ASSESSMENT — REFRACTION_CURRENTRX
OD_DIAMETER: 14.0
OD_DIAMETER: 11.2
OD_BRAND: ROSE K2 IC
OD_BRAND: BIOFINITY XR
OD_ADDL_SPECS: BOSTON XO2 BLUE
OD_SPHERE: +11.00
OD_BASECURVE: 8.6
OD_BASECURVE: 7.5
OD_SPHERE: +10.50

## 2019-11-13 ASSESSMENT — REFRACTION_MANIFEST
OS_CYLINDER: SPHERE
OS_SPHERE: -3.00

## 2019-11-13 ASSESSMENT — REFRACTION_WEARINGRX
OD_SPHERE: -3.00
OS_CYLINDER: SPHERE
OS_SPHERE: -3.25
SPECS_TYPE: SVL
OD_CYLINDER: SPHERE

## 2019-11-13 ASSESSMENT — VISUAL ACUITY
OD_CC: 20/80-1
CORRECTION_TYPE: GLASSES, CONTACTS
OD_CC: 20/500
OS_CC: 20/20
METHOD: SNELLEN - LINEAR
METHOD: SNELLEN - LINEAR
CORRECTION_TYPE: CONTACTS

## 2019-11-13 ASSESSMENT — EXTERNAL EXAM - LEFT EYE: OS_EXAM: NORMAL

## 2019-11-13 ASSESSMENT — SLIT LAMP EXAM - LIDS
COMMENTS: NORMAL
COMMENTS: NORMAL

## 2019-11-13 ASSESSMENT — EXTERNAL EXAM - RIGHT EYE: OD_EXAM: NORMAL

## 2019-11-13 NOTE — PROGRESS NOTES
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 Risk for K edema/neovascularization with high plus soft lens  -Excellent comfort/fit with RGP to start, needs slightly increased plus. Wear under current glasses  -Successful I&R, reviewed CL care and hygiene with pt (Newborn Simplus) as well as adaptation and increasing wear time    Dispensed lens today, order new lens with updated power and mail direct to pt. RTC here prn for lens concerns otherwise would recommend annual exam    Contact Lens Billing  V-Code:  - GP Spherical  Final Contact Lens Rx       Brand Base Curve Diameter Sphere Lens Addl. Specs    Right Alivia K2 IC 7.5 11.2 +11.50 Gr 1 ACT Newborn XO2 blue    Left               # of units: 1  Price per Unit: $125    This patient requires contact lenses that are medically necessary for either improvement in vision over spectacles, support of the ocular surface, or other therapeutic benefit. These are not cosmetic contact lenses.     Encounter Diagnoses   Name Primary?     Aphakia of right eye Yes     Corneal scar and opacity - Right Eye      Irregular astigmatism of right eye

## 2020-02-25 DIAGNOSIS — S05.31XD RUPTURED GLOBE OF RIGHT EYE, SUBSEQUENT ENCOUNTER: Primary | ICD-10-CM

## 2020-02-26 ENCOUNTER — OFFICE VISIT (OUTPATIENT)
Dept: OPHTHALMOLOGY | Facility: CLINIC | Age: 40
End: 2020-02-26
Attending: OPHTHALMOLOGY
Payer: COMMERCIAL

## 2020-02-26 ENCOUNTER — DOCUMENTATION ONLY (OUTPATIENT)
Dept: OPTOMETRY | Facility: CLINIC | Age: 40
End: 2020-02-26

## 2020-02-26 DIAGNOSIS — S05.31XD RUPTURED GLOBE OF RIGHT EYE, SUBSEQUENT ENCOUNTER: ICD-10-CM

## 2020-02-26 PROBLEM — S05.31XA: Status: ACTIVE | Noted: 2018-11-08

## 2020-02-26 PROBLEM — H26.101 TRAUMATIC CATARACT OF RIGHT EYE: Status: ACTIVE | Noted: 2018-11-08

## 2020-02-26 PROCEDURE — G0463 HOSPITAL OUTPT CLINIC VISIT: HCPCS | Mod: ZF

## 2020-02-26 PROCEDURE — 92134 CPTRZ OPH DX IMG PST SGM RTA: CPT | Mod: ZF | Performed by: OPHTHALMOLOGY

## 2020-02-26 ASSESSMENT — CONF VISUAL FIELD
OS_NORMAL: 1
METHOD: COUNTING FINGERS
OD_NORMAL: 1

## 2020-02-26 ASSESSMENT — SLIT LAMP EXAM - LIDS
COMMENTS: NORMAL
COMMENTS: NORMAL

## 2020-02-26 ASSESSMENT — VISUAL ACUITY
OD_PH_CC: 20/100
OD_CC: 20/500
OS_CC: 20/15
METHOD: SNELLEN - LINEAR

## 2020-02-26 ASSESSMENT — TONOMETRY
IOP_METHOD: TONOPEN
OD_IOP_MMHG: 16
OS_IOP_MMHG: 14

## 2020-02-26 ASSESSMENT — CUP TO DISC RATIO
OD_RATIO: 0.2
OS_RATIO: 0.3

## 2020-02-26 ASSESSMENT — REFRACTION_WEARINGRX
OD_CYLINDER: SPHERE
OS_SPHERE: -3.25
OS_CYLINDER: SPHERE
OD_SPHERE: -3.00
SPECS_TYPE: SVL

## 2020-02-26 ASSESSMENT — EXTERNAL EXAM - RIGHT EYE: OD_EXAM: NORMAL

## 2020-02-26 ASSESSMENT — EXTERNAL EXAM - LEFT EYE: OS_EXAM: NORMAL

## 2020-02-26 NOTE — PROGRESS NOTES
CC: Ruptured globe right eye     Interval Hx: complaining of significant photophobia  S/p right eye 25g PPV/SO removal/AFx (7/9/2019)  Met with Dr. Lazaro today for CTL evaluation - BCVA 20/50+ with hard CTL (dispensed soft CTL for trial prior to hard CTL)       OCULAR IMAGING  Optical Coherence Tomography 2-26-20  Right eye: slight irregularity of the retina surface; subfoveal irregularity of the IS/OS and Retinal pigment epithelium with less disruption of EZ/ELM on OCT today. Epiretinal membrane nasal  Left eye: good foveal contour    PHOTOS 10-23-19  Right eye c/w exam  Left eye c/w exam    Assessment/Plan:   1. Ruptured globe right eye  - S/p PPV/SBP/MS/retinectomy/SO 1000/IVT (11/2018, Dr. Walsh)  - S/p right eye 25g PPV/SO removal/AFx (7/9/2019)  - Advised monocular precautions: polycarbonate lenses with glasses on at all times.   - MRx 7/2019 BCVA 20/80 right eye, 20/20 left eye   - CTL fitting 10/23/19 with BCVA 20/50+ with hard CTL  - patient complaining of photophobia- possible due to traumatic pupil  - could consider colored CL to decrease light sensitivity + Hard CL- patient seeing with Dr. Lazaro today for trial of CL piggyback    2. Ocular hypertension, right eye  -on timolol qAM, brimonidine twice a day  - consider stopping timolol     3. Aphakia, right eye  -vision 20/400 today with +12.0 lens  -prior JACOB was 20/150  -consider secondary IOL insertion in the future If no tolerance to CL   -needs IOL calcs     Follow up in 4 months with Optical Coherence Tomography   ~~~~~~~~~~~~~~~~~~~~~~~~~~~~~~~~~~   Complete documentation of historical and exam elements from today's encounter can be found in the full encounter summary report (not reduplicated in this progress note).  I personally obtained the chief complaint(s) and history of present illness.  I confirmed and edited as necessary the review of systems, past medical/surgical history, family history, social history, and examination findings  as documented by others; and I examined the patient myself.  I personally reviewed the relevant tests, images, and reports as documented above.  I personally reviewed the ophthalmic test(s) associated with this encounter, agree with the interpretation(s) as documented by the resident/fellow, and have edited the corresponding report(s) as necessary.   I formulated and edited as necessary the assessment and plan and discussed the findings and management plan with the patient and family    Tamera Walsh MD   of Ophthalmology.  Retina Service   Department of Ophthalmology and Visual Neurosciences   [facility]  Phone: [phone]   Fax: [phone]

## 2020-02-26 NOTE — PROGRESS NOTES
Seeing Nico today. Can get 6-8 hours wear on RGP. Light sensitive, closely eye quite a bit.    Can consider tinting RGP brown.    Can also consider piggyback with tinted soft lens underneath.     Trials of Air Optix colors given today    If these do not work we can consider Walker tinted lenses (overall vs tinted annular only).    They will trial and mychart me with results.

## 2020-03-11 ENCOUNTER — HEALTH MAINTENANCE LETTER (OUTPATIENT)
Age: 40
End: 2020-03-11

## 2020-08-26 ENCOUNTER — OFFICE VISIT (OUTPATIENT)
Dept: OPHTHALMOLOGY | Facility: CLINIC | Age: 40
End: 2020-08-26
Attending: OPHTHALMOLOGY
Payer: COMMERCIAL

## 2020-08-26 DIAGNOSIS — H35.371 EPIRETINAL MEMBRANE (ERM) OF RIGHT EYE: Primary | ICD-10-CM

## 2020-08-26 PROCEDURE — G0463 HOSPITAL OUTPT CLINIC VISIT: HCPCS | Mod: ZF

## 2020-08-26 PROCEDURE — 92134 CPTRZ OPH DX IMG PST SGM RTA: CPT | Mod: ZF | Performed by: OPHTHALMOLOGY

## 2020-08-26 ASSESSMENT — CUP TO DISC RATIO
OS_RATIO: 0.3
OD_RATIO: 0.2

## 2020-08-26 ASSESSMENT — REFRACTION_WEARINGRX
OD_SPHERE: -3.00
SPECS_TYPE: SVL
OS_CYLINDER: SPHERE
OD_CYLINDER: SPHERE
OD_SPHERE: -3.00
OD_CYLINDER: SPHERE
OS_SPHERE: -3.25
SPECS_TYPE: SVL
OS_CYLINDER: SPHERE
OS_SPHERE: -3.25

## 2020-08-26 ASSESSMENT — VISUAL ACUITY
METHOD: SNELLEN - LINEAR
OD_SC: 20/500
OD_PH_SC: 20/100
OS_SC: 20/15
CORRECTION_TYPE: GLASSES

## 2020-08-26 ASSESSMENT — TONOMETRY
OD_IOP_MMHG: 14
IOP_METHOD: TONOPEN
OS_IOP_MMHG: 14

## 2020-08-26 ASSESSMENT — EXTERNAL EXAM - LEFT EYE: OS_EXAM: NORMAL

## 2020-08-26 ASSESSMENT — SLIT LAMP EXAM - LIDS
COMMENTS: NORMAL
COMMENTS: NORMAL

## 2020-08-26 ASSESSMENT — CONF VISUAL FIELD
OS_NORMAL: 1
OD_NORMAL: 1

## 2020-08-26 ASSESSMENT — EXTERNAL EXAM - RIGHT EYE: OD_EXAM: NORMAL

## 2020-08-26 NOTE — NURSING NOTE
Chief Complaints and History of Present Illnesses   Patient presents with     Follow Up     Chief Complaint(s) and History of Present Illness(es)     Follow Up     Laterality: right eye    Associated symptoms: photophobia.  Negative for eye pain, flashes, floaters and pain with eye movement    Response to treatment: no improvement    Pain scale: 0/10              Comments     6 month follow up Ruptured globe right eye  Refresh every day RE  Don't like wearing ctl. Wear glasses  Rosemarie COONEY 10:08 AM August 26, 2020

## 2020-08-26 NOTE — PROGRESS NOTES
CC: Ruptured globe right eye     Interval Hx: complaining of significant photophobia  S/p right eye 25g PPV/SO removal/AFx (7/9/2019)  Met with Dr. Lazaro today for CTL evaluation - BCVA 20/50+ with hard CTL (dispensed soft CTL for trial prior to hard CTL)     OCULAR IMAGING  Optical Coherence Tomography 8-26-20  Right eye: slight irregularity of the retina surface; subfoveal irregularity of the IS/OS and Retinal pigment epithelium with less disruption of EZ/ELM on OCT today. Epiretinal membrane nasal  Left eye: good foveal contour    PHOTOS 10-23-19  Right eye c/w exam  Left eye c/w exam    Assessment/Plan:   1. Ruptured globe right eye  - S/p PPV/SBP/MS/retinectomy/SO 1000/IVT (11/2018, Dr. Walsh)  - S/p right eye 25g PPV/SO removal/AFx (7/9/2019)  - Advised monocular precautions: polycarbonate lenses with glasses on at all times.   - MRx 7/2019 BCVA 20/80 right eye, 20/20 left eye   - CTL fitting 10/23/19 with BCVA 20/50+ with hard CTL     2. Ocular hypertension, right eye  -on timolol qAM, brimonidine twice a day  - consider stopping timolol     3. Aphakia, right eye  -vision 20/400 today with +12.0 lens  -prior JACOB was 20/150  -consider secondary IOL insertion in the future If no tolerance to CL   - patient will call when ready  -needs IOL calcs ( recommed  Not wear CL x 10 days before intraocular lens calcs) and pentacam    Follow up in 12 months with Optical Coherence Tomography and optos and refraction  Possibly intraocular lens calculation (aphakia right eye)     ~~~~~~~~~~~~~~~~~~~~~~~~~~~~~~~~~~   Complete documentation of historical and exam elements from today's encounter can be found in the full encounter summary report (not reduplicated in this progress note).  I personally obtained the chief complaint(s) and history of present illness.  I confirmed and edited as necessary the review of systems, past medical/surgical history, family history, social history, and examination findings as  documented by others; and I examined the patient myself.  I personally reviewed the relevant tests, images, and reports as documented above.  I formulated and edited as necessary the assessment and plan and discussed the findings and management plan with the patient and family    Tamera Walsh MD   of Ophthalmology.  Retina Service   Department of Ophthalmology and Visual Neurosciences   [facility]  Phone: [phone]   Fax: [phone]

## 2021-01-03 ENCOUNTER — HEALTH MAINTENANCE LETTER (OUTPATIENT)
Age: 41
End: 2021-01-03

## 2021-04-25 ENCOUNTER — HEALTH MAINTENANCE LETTER (OUTPATIENT)
Age: 41
End: 2021-04-25

## 2021-05-14 NOTE — PATIENT INSTRUCTIONS
Position: any  No heavy lifting   Crow shield at all times  Retina detachment and endophthalmitis precautions were discussed with the patient and was asked to return if any of the those occur    Medications to operative eye  Maxitrol (pink top) four times a day  And slow tapering  Three times a day x 1 week, then twice a day x 1 week and then once a day till finish       Oriented - self; Oriented - place; Oriented - time

## 2021-08-16 NOTE — TELEPHONE ENCOUNTER
Dr. Tavon Nguyen referral from [place]    Metallic foreign body in right eye-- embedded in anterior chamber and into the lens with no view of retina.  Pt working on piece of [employer] equipment      seen last night 1155 last night  And seen at 0800 AM with dr. Nguyen again    Cornea laceration and total traumatic cataract  Bandage contact lens placed-- no suture of laceration noted    Notes have been faxed    No other eye issues    CT of orbits done [facility] in [place]  Metallic foreign body projecting upon the right globe  Measures approx 6 mm longest extent    Pt will drive down now and be NPO  Will review which MD will start workup by time pt arrives around 2 PM  [facility] MD may perform pre-op per review with our MD      [phone]       100

## 2021-09-13 DIAGNOSIS — H35.371 EPIRETINAL MEMBRANE (ERM) OF RIGHT EYE: Primary | ICD-10-CM

## 2021-09-22 ENCOUNTER — OFFICE VISIT (OUTPATIENT)
Dept: OPHTHALMOLOGY | Facility: CLINIC | Age: 41
End: 2021-09-22
Attending: OPHTHALMOLOGY
Payer: COMMERCIAL

## 2021-09-22 DIAGNOSIS — H35.371 EPIRETINAL MEMBRANE (ERM) OF RIGHT EYE: ICD-10-CM

## 2021-09-22 DIAGNOSIS — H27.01 APHAKIA OF RIGHT EYE: Primary | ICD-10-CM

## 2021-09-22 PROCEDURE — G0463 HOSPITAL OUTPT CLINIC VISIT: HCPCS

## 2021-09-22 PROCEDURE — 92025 CPTRIZED CORNEAL TOPOGRAPHY: CPT | Performed by: OPHTHALMOLOGY

## 2021-09-22 PROCEDURE — 92015 DETERMINE REFRACTIVE STATE: CPT

## 2021-09-22 PROCEDURE — 76519 ECHO EXAM OF EYE: CPT | Performed by: OPHTHALMOLOGY

## 2021-09-22 PROCEDURE — 92134 CPTRZ OPH DX IMG PST SGM RTA: CPT | Performed by: OPHTHALMOLOGY

## 2021-09-22 PROCEDURE — 99214 OFFICE O/P EST MOD 30 MIN: CPT | Performed by: OPHTHALMOLOGY

## 2021-09-22 PROCEDURE — 99207 FUNDUS PHOTOS OU (BOTH EYES): CPT | Mod: 26 | Performed by: OPHTHALMOLOGY

## 2021-09-22 PROCEDURE — 92250 FUNDUS PHOTOGRAPHY W/I&R: CPT | Performed by: OPHTHALMOLOGY

## 2021-09-22 ASSESSMENT — SLIT LAMP EXAM - LIDS
COMMENTS: NORMAL
COMMENTS: NORMAL

## 2021-09-22 ASSESSMENT — VISUAL ACUITY
METHOD_MR: OPPOSITE SIGNS CORRECT.
OD_PH_CC: 20/60
OD_CC: 20/250
CORRECTION_TYPE: GLASSES
OS_CC+: +1
METHOD: SNELLEN - LINEAR
OS_CC: 20/20

## 2021-09-22 ASSESSMENT — REFRACTION_WEARINGRX
OS_CYLINDER: SPHERE
OS_SPHERE: -3.00
OD_CYLINDER: SPHERE
SPECS_TYPE: SVL
OD_SPHERE: PLANO

## 2021-09-22 ASSESSMENT — REFRACTION_MANIFEST
OS_CYLINDER: SPHERE
OD_ADD: +2.50
OD_SPHERE: +6.50
OS_ADD: +2.50
OD_CYLINDER: +0.50
OS_SPHERE: -3.00
OD_AXIS: 097

## 2021-09-22 ASSESSMENT — CUP TO DISC RATIO
OS_RATIO: 0.3
OD_RATIO: 0.2

## 2021-09-22 ASSESSMENT — TONOMETRY
OS_IOP_MMHG: 14
OD_IOP_MMHG: 14
IOP_METHOD: TONOPEN

## 2021-09-22 ASSESSMENT — EXTERNAL EXAM - LEFT EYE: OS_EXAM: NORMAL

## 2021-09-22 ASSESSMENT — CONF VISUAL FIELD
OD_NORMAL: 1
OS_NORMAL: 1
METHOD: COUNTING FINGERS

## 2021-09-22 ASSESSMENT — EXTERNAL EXAM - RIGHT EYE: OD_EXAM: NORMAL

## 2021-09-22 NOTE — PROGRESS NOTES
CC: Ruptured globe right eye     Interval Hx: complaining of significant photophobia  S/p right eye 25g PPV/SO removal/AFx (7/9/2019)  Met with Dr. Lazaro today for CTL evaluation - BCVA 20/50+ with hard CTL (dispensed soft CTL for trial prior to hard CTL)     OCULAR IMAGING  Optical Coherence Tomography 9-22-21  Right eye: slight irregularity of the retina surface; subfoveal irregularity of the IS/OS and Retinal pigment epithelium with less disruption of EZ/ELM on OCT today. Epiretinal membrane nasal  Left eye: good foveal contour    PHOTOS 9-22-21  Right eye c/w exam  Left eye c/w exam    intraocular lens calcs  Assessment/Plan:   1. Ruptured globe right eye  - S/p PPV/SBP/MS/retinectomy/SO 1000/IVT (11/2018, Dr. Walsh)  - S/p right eye 25g PPV/SO removal/AFx (7/9/2019)  - Advised monocular precautions: polycarbonate lenses with glasses on at all times.   - MRx 7/2019 BCVA 20/60 right eye, 20/20 left eye   - CTL fitting 10/23/19 with BCVA 20/50+ with hard CTL     2. Ocular hypertension, right eye  -on timolol qAM, brimonidine twice a day  - consider stopping timolol     3. Aphakia, right eye  - patient not tolerating CL   -  Patient wants to have secondary intraocular lens     Plan for   secocondary intraocular lens right eye - yamane technique  General anesthesia and peribulbar block  60 min   risks discussed 1:1000 risk of infection/bleed/loss of eye; 1:100 risk of RD and need for further surgery.Patient aware of prolonged healing after retinal surgery (up to a year after surgery) as well as possibility of air/gas/SO  instillation into eye. Patient agreed to proceed with surgery.    Patient would like to have surgery around december  ~~~~~~~~~~~~~~~~~~~~~~~~~~~~~~~~~~   Complete documentation of historical and exam elements from today's encounter can be found in the full encounter summary report (not reduplicated in this progress note).  I personally obtained the chief complaint(s) and history of present  illness.  I confirmed and edited as necessary the review of systems, past medical/surgical history, family history, social history, and examination findings as documented by others; and I examined the patient myself.  I personally reviewed the relevant tests, images, and reports as documented above.  I formulated and edited as necessary the assessment and plan and discussed the findings and management plan with the patient and family    Tamera Walsh MD   of Ophthalmology.  Retina Service   Department of Ophthalmology and Visual Neurosciences   [facility]  Phone: [phone]   Fax: [phone]

## 2021-09-22 NOTE — NURSING NOTE
Chief Complaints and History of Present Illnesses   Patient presents with     Epiretinal Membrane Follow Up     1 year follow up RE     Chief Complaint(s) and History of Present Illness(es)     Epiretinal Membrane Follow Up     Comments: 1 year follow up RE              Comments     Pt states that vision is better with new glasses, pt got them 3 weeks ago. No new flashes or floaters.  No redness or dryness.     JOSE Dunham September 22, 2021 9:21 AM

## 2021-10-10 ENCOUNTER — HEALTH MAINTENANCE LETTER (OUTPATIENT)
Age: 41
End: 2021-10-10

## 2021-10-28 ENCOUNTER — TELEPHONE (OUTPATIENT)
Dept: OPHTHALMOLOGY | Facility: CLINIC | Age: 41
End: 2021-10-28

## 2021-10-28 NOTE — TELEPHONE ENCOUNTER
Called patient to schedule procedure with Dr. Walsh, there was no answer.  Left message with my direct line [phone].

## 2021-11-10 ENCOUNTER — HOSPITAL ENCOUNTER (OUTPATIENT)
Facility: AMBULATORY SURGERY CENTER | Age: 41
End: 2021-11-10
Attending: OPHTHALMOLOGY
Payer: COMMERCIAL

## 2021-11-19 ENCOUNTER — TELEPHONE (OUTPATIENT)
Dept: OPHTHALMOLOGY | Facility: CLINIC | Age: 41
End: 2021-11-19
Payer: COMMERCIAL

## 2021-11-19 NOTE — TELEPHONE ENCOUNTER
Spoke with Otoniel and let him know December dates are still available. Otoniel said he is trying to figure out what their new insurance will be in the new year.  This will help him decide if he should get the surgery now under the current coverage or wait until the new year. I offered to send my contact information to him as well through [facility] and Otoniel agreed. He will call once he figures out the insurance [email or web address]

## 2022-02-22 ENCOUNTER — TELEPHONE (OUTPATIENT)
Dept: OPHTHALMOLOGY | Facility: CLINIC | Age: 42
End: 2022-02-22
Payer: COMMERCIAL

## 2022-03-01 PROBLEM — H27.01 APHAKIA OF RIGHT EYE: Status: ACTIVE | Noted: 2018-12-13

## 2022-05-21 ENCOUNTER — HEALTH MAINTENANCE LETTER (OUTPATIENT)
Age: 42
End: 2022-05-21

## 2022-09-18 ENCOUNTER — HEALTH MAINTENANCE LETTER (OUTPATIENT)
Age: 42
End: 2022-09-18

## 2023-06-04 ENCOUNTER — HEALTH MAINTENANCE LETTER (OUTPATIENT)
Age: 43
End: 2023-06-04

## 2024-07-14 ENCOUNTER — HEALTH MAINTENANCE LETTER (OUTPATIENT)
Age: 44
End: 2024-07-14

## (undated) DEVICE — SU PLAIN 6-0 TG140-8 18" 1735G

## (undated) DEVICE — EYE SHIELD PLASTIC

## (undated) DEVICE — PACK VITRECTOMY PQ15VI57E

## (undated) DEVICE — GLOVE PROTEXIS MICRO 7.0  2D73PM70

## (undated) DEVICE — EYE MARKING PAD 581057

## (undated) DEVICE — DRAPE MICRO 41X81"

## (undated) DEVICE — GLOVE PROTEXIS MICRO 7.5  2D73PM75

## (undated) DEVICE — SU ETHILON 10-0 CS160-6 12" 9000G

## (undated) DEVICE — NDL 30GA 0.5" 305106

## (undated) DEVICE — EYE KIT AUTO GAS FOR CONSTELLATION 8065751014

## (undated) DEVICE — NDL 27GA 0.5" 305109

## (undated) DEVICE — Device

## (undated) DEVICE — SYR 01ML 27GA 0.5" ECLIPSE 305789

## (undated) DEVICE — EYE CANN IRR 20GA ACM LEWICKY 585061

## (undated) DEVICE — TAPE MICROPORE 1"X1.5YD 1530S-1

## (undated) DEVICE — TUBING SUCTION 12"X1/4" N612

## (undated) DEVICE — GLOVE PROTEXIS MICRO 6.0  2D73PM60

## (undated) DEVICE — EYE PERFLUORON KIT 5ML 8065900163

## (undated) DEVICE — LINEN TOWEL PACK X5 5464

## (undated) DEVICE — ESU CORD BIPOLAR GREEN 10-4000

## (undated) DEVICE — EYE PACK CONSTELLATION VITRECTOMY TOTAL PLUS 25GA 8065751462

## (undated) DEVICE — SYR 03ML LL W/O NDL 309657

## (undated) DEVICE — NDL 18GA 1.5" 305196

## (undated) DEVICE — TAPE MICROPORE 2"X1.5YD 1530S-2

## (undated) DEVICE — EYE PACK 25GA PLUS CONSTELLATION PPK4253

## (undated) DEVICE — EYE FORCEP TIP MAX GRIP ADV DISP 25GA 725.13

## (undated) DEVICE — EYE NDL RETROBULBAR 25GA 1.5" 581275

## (undated) DEVICE — EYE PACK 23GA CONSTELLATION PPK4254-03

## (undated) DEVICE — EYE SOL BSS 500ML BAG 0065-1795-04

## (undated) DEVICE — KIT CULTURE TRANSPORT SYS A.C.T. II DUAL ANEROBE R124022

## (undated) DEVICE — EYE NDL RETROBULBAR ATKINSON 25GA 1.5" 581637

## (undated) DEVICE — EYE CANN SOFT TIP 25GA FOR VALVED SET 8065149530

## (undated) DEVICE — EYE KNIFE SLIT XSTAR VISITEC 2.6MM 45DEG 373726

## (undated) DEVICE — EYE CANN IRR 30GA  ANTERIOR CHAMBER 581273

## (undated) DEVICE — EYE PACK CONSTELLATION VFC 8065750957

## (undated) DEVICE — EYE BLADE SCLERAL MVR 20GA 8065912001

## (undated) DEVICE — SYR 05ML SLIP TIP W/O NDL 309647

## (undated) DEVICE — EYE PACK 25GA CONSTELLATION 10,000 CPM PPK9380-02

## (undated) DEVICE — EYE SPONGE SPEAR WECK CEL 0008685

## (undated) DEVICE — SYR 01ML 25GA 5/8" TBC

## (undated) RX ORDER — GABAPENTIN 300 MG/1
CAPSULE ORAL
Status: DISPENSED
Start: 2019-07-09

## (undated) RX ORDER — TRIAMCINOLONE ACETONIDE 40 MG/ML
INJECTION, SUSPENSION INTRA-ARTICULAR; INTRAMUSCULAR
Status: DISPENSED
Start: 2018-11-02

## (undated) RX ORDER — DEXAMETHASONE SODIUM PHOSPHATE 4 MG/ML
INJECTION, SOLUTION INTRA-ARTICULAR; INTRALESIONAL; INTRAMUSCULAR; INTRAVENOUS; SOFT TISSUE
Status: DISPENSED
Start: 2018-11-02

## (undated) RX ORDER — ONDANSETRON 2 MG/ML
INJECTION INTRAMUSCULAR; INTRAVENOUS
Status: DISPENSED
Start: 2018-11-20

## (undated) RX ORDER — TROPICAMIDE 10 MG/ML
SOLUTION/ DROPS OPHTHALMIC
Status: DISPENSED
Start: 2018-11-02

## (undated) RX ORDER — NEOSTIGMINE METHYLSULFATE 1 MG/ML
VIAL (ML) INJECTION
Status: DISPENSED
Start: 2018-11-02

## (undated) RX ORDER — CIPROFLOXACIN 2 MG/ML
INJECTION, SOLUTION INTRAVENOUS
Status: DISPENSED
Start: 2018-11-02

## (undated) RX ORDER — PHENYLEPHRINE HYDROCHLORIDE 25 MG/ML
SOLUTION/ DROPS OPHTHALMIC
Status: DISPENSED
Start: 2018-11-02

## (undated) RX ORDER — PROPOFOL 10 MG/ML
INJECTION, EMULSION INTRAVENOUS
Status: DISPENSED
Start: 2018-11-05

## (undated) RX ORDER — FENTANYL CITRATE 50 UG/ML
INJECTION, SOLUTION INTRAMUSCULAR; INTRAVENOUS
Status: DISPENSED
Start: 2019-07-09

## (undated) RX ORDER — DEXAMETHASONE SODIUM PHOSPHATE 4 MG/ML
INJECTION, SOLUTION INTRA-ARTICULAR; INTRALESIONAL; INTRAMUSCULAR; INTRAVENOUS; SOFT TISSUE
Status: DISPENSED
Start: 2018-11-05

## (undated) RX ORDER — FENTANYL CITRATE 50 UG/ML
INJECTION, SOLUTION INTRAMUSCULAR; INTRAVENOUS
Status: DISPENSED
Start: 2018-11-02

## (undated) RX ORDER — CELECOXIB 200 MG/1
CAPSULE ORAL
Status: DISPENSED
Start: 2018-11-05

## (undated) RX ORDER — FENTANYL CITRATE 50 UG/ML
INJECTION, SOLUTION INTRAMUSCULAR; INTRAVENOUS
Status: DISPENSED
Start: 2018-11-05

## (undated) RX ORDER — ONDANSETRON 2 MG/ML
INJECTION INTRAMUSCULAR; INTRAVENOUS
Status: DISPENSED
Start: 2018-11-02

## (undated) RX ORDER — PROPOFOL 10 MG/ML
INJECTION, EMULSION INTRAVENOUS
Status: DISPENSED
Start: 2018-11-02

## (undated) RX ORDER — ACETAMINOPHEN 325 MG/1
TABLET ORAL
Status: DISPENSED
Start: 2018-11-20

## (undated) RX ORDER — ONDANSETRON 2 MG/ML
INJECTION INTRAMUSCULAR; INTRAVENOUS
Status: DISPENSED
Start: 2019-07-09

## (undated) RX ORDER — ATROPINE SULFATE 10 MG/ML
SOLUTION/ DROPS OPHTHALMIC
Status: DISPENSED
Start: 2018-11-02

## (undated) RX ORDER — GLYCOPYRROLATE 0.2 MG/ML
INJECTION, SOLUTION INTRAMUSCULAR; INTRAVENOUS
Status: DISPENSED
Start: 2018-11-02

## (undated) RX ORDER — FENTANYL CITRATE 50 UG/ML
INJECTION, SOLUTION INTRAMUSCULAR; INTRAVENOUS
Status: DISPENSED
Start: 2018-11-20

## (undated) RX ORDER — ONDANSETRON 2 MG/ML
INJECTION INTRAMUSCULAR; INTRAVENOUS
Status: DISPENSED
Start: 2018-11-05

## (undated) RX ORDER — GABAPENTIN 300 MG/1
CAPSULE ORAL
Status: DISPENSED
Start: 2018-11-05

## (undated) RX ORDER — ACETAMINOPHEN 325 MG/1
TABLET ORAL
Status: DISPENSED
Start: 2019-07-09

## (undated) RX ORDER — CIPROFLOXACIN 2 MG/ML
INJECTION, SOLUTION INTRAVENOUS
Status: DISPENSED
Start: 2018-11-05

## (undated) RX ORDER — CEFAZOLIN SODIUM 500 MG/2.2ML
INJECTION, POWDER, FOR SOLUTION INTRAMUSCULAR; INTRAVENOUS
Status: DISPENSED
Start: 2018-11-02

## (undated) RX ORDER — LIDOCAINE HYDROCHLORIDE 20 MG/ML
INJECTION, SOLUTION EPIDURAL; INFILTRATION; INTRACAUDAL; PERINEURAL
Status: DISPENSED
Start: 2018-11-02

## (undated) RX ORDER — GLYCOPYRROLATE 0.2 MG/ML
INJECTION INTRAMUSCULAR; INTRAVENOUS
Status: DISPENSED
Start: 2018-11-20

## (undated) RX ORDER — WATER 10 ML/10ML
INJECTION INTRAMUSCULAR; INTRAVENOUS; SUBCUTANEOUS
Status: DISPENSED
Start: 2018-11-02

## (undated) RX ORDER — LIDOCAINE HYDROCHLORIDE 20 MG/ML
INJECTION, SOLUTION EPIDURAL; INFILTRATION; INTRACAUDAL; PERINEURAL
Status: DISPENSED
Start: 2018-11-05

## (undated) RX ORDER — CYCLOPENTOLATE HYDROCHLORIDE 10 MG/ML
SOLUTION/ DROPS OPHTHALMIC
Status: DISPENSED
Start: 2018-11-02

## (undated) RX ORDER — TETRACAINE HYDROCHLORIDE 5 MG/ML
SOLUTION OPHTHALMIC
Status: DISPENSED
Start: 2018-11-02

## (undated) RX ORDER — ACETAMINOPHEN 325 MG/1
TABLET ORAL
Status: DISPENSED
Start: 2018-11-05